# Patient Record
Sex: MALE | Race: WHITE | NOT HISPANIC OR LATINO | Employment: OTHER | URBAN - METROPOLITAN AREA
[De-identification: names, ages, dates, MRNs, and addresses within clinical notes are randomized per-mention and may not be internally consistent; named-entity substitution may affect disease eponyms.]

---

## 2017-07-19 PROBLEM — I10 HTN (HYPERTENSION), BENIGN: Status: ACTIVE | Noted: 2017-07-19

## 2017-08-21 ENCOUNTER — OFFICE VISIT (OUTPATIENT)
Dept: CARDIOLOGY | Facility: CLINIC | Age: 82
End: 2017-08-21

## 2017-08-21 VITALS
DIASTOLIC BLOOD PRESSURE: 98 MMHG | HEART RATE: 59 BPM | HEIGHT: 69 IN | WEIGHT: 202.8 LBS | BODY MASS INDEX: 30.04 KG/M2 | SYSTOLIC BLOOD PRESSURE: 144 MMHG

## 2017-08-21 DIAGNOSIS — E78.5 DYSLIPIDEMIA: ICD-10-CM

## 2017-08-21 DIAGNOSIS — I25.810 CORONARY ARTERY DISEASE INVOLVING CORONARY BYPASS GRAFT OF NATIVE HEART WITHOUT ANGINA PECTORIS: Primary | ICD-10-CM

## 2017-08-21 DIAGNOSIS — I44.0 AV BLOCK, 1ST DEGREE: ICD-10-CM

## 2017-08-21 DIAGNOSIS — R94.31 EKG, ABNORMAL: ICD-10-CM

## 2017-08-21 DIAGNOSIS — I10 ESSENTIAL HYPERTENSION: ICD-10-CM

## 2017-08-21 DIAGNOSIS — R94.31 ABNORMAL EKG: ICD-10-CM

## 2017-08-21 PROCEDURE — 99214 OFFICE O/P EST MOD 30 MIN: CPT | Performed by: PHYSICIAN ASSISTANT

## 2017-08-21 PROCEDURE — 93000 ELECTROCARDIOGRAM COMPLETE: CPT | Performed by: PHYSICIAN ASSISTANT

## 2017-08-21 RX ORDER — MELATONIN
5000 DAILY
Status: ON HOLD | COMMUNITY
End: 2019-12-04

## 2017-08-21 NOTE — PROGRESS NOTES
[facility] at [facility] - Office Note  Agapito Downey         [address] [place] [address]  1935   [phone] (home)      LOCATION:  [place] office.  Visit Type: Follow Up.    PCP:  Kendrick Oconnor MD    08/21/17   Agapito Downey is a 82 y.o.  male  retired.      Chief Complaint: FU on CAD, HTN, HLP.  PROBLEM LIST:  1. Coronary artery disease:  a. History of myocardial infarction in 1994 with TPA and associated NSVT.  b. Angioplasty at that time.  c. Recurrent chest pain approximately 6 months later with catheterization in November 1994.  d. Subsequent coronary artery bypass grafting, November 1994.  e. Multiple stress tests and echocardiograms post procedure with most recent echocardiogram, 01/19/2011:  Ejection fraction 60% to 65% with mild diastolic dysfunction.  No evidence of ischemia on stress test.  2. First-degree AV block.  3. Benign hypertension.  4. Dyslipidemia.  5. History of prostate cancer with seed implantation.  6. Osteoarthritis.  7. Family history of heart disease.  8. Surgical history:  a. Laparoscopic cholecystectomy.  b. Coronary artery bypass grafting.  c. Prostate surgery with seed implantation.  .    Allergies   Allergen Reactions   • Reserpine    • Statins      Myalgias and mood affect disorder             Current Outpatient Prescriptions:   •  aspirin 81 MG EC tablet, Take 81 mg by mouth daily., Disp: , Rfl:   •  carvedilol (COREG) 6.25 MG tablet, 6.25 mg 2 (two) times a day with meals., Disp: , Rfl:   •  cholecalciferol (VITAMIN D3) 1000 units tablet, Take 1,000 Units by mouth Daily., Disp: , Rfl:   •  fluticasone (VERAMYST) 27.5 MCG/SPRAY nasal spray, 2 sprays into each nostril Daily., Disp: , Rfl:   •  magnesium oxide (MAGOX) 400 (241.3 Mg) MG tablet tablet, Take 400 mg by mouth Daily., Disp: , Rfl:   •  Multiple Vitamins-Minerals (CENTRUM ULTRA MENS PO), Take 1 tablet by mouth Daily., Disp: , Rfl:   •  Probiotic Product  "(PROBIOTIC ADVANCED PO), Take 1 tablet by mouth Daily., Disp: , Rfl:   •  psyllium (METAMUCIL) 58.6 % packet, Take 1 packet by mouth Daily., Disp: , Rfl:   •  spironolactone (ALDACTONE) 25 MG tablet, Take 25 mg by mouth every other day., Disp: , Rfl:   •  WELCHOL 625 MG tablet, 625 mg 3 (Three) Times a Day., Disp: , Rfl:   •  ZETIA 10 MG tablet, 10 mg daily., Disp: , Rfl:     HPI  Mr. Downey is here today for routine follow-up on chronic CAD (prior CABG), long-standing controlled hypertension, and chronic first-degree AV block.  He has been on a beta blocker during the entire time he's had a documented AV block.  In the past, he's been asymptomatic.  Today he still continues to deny syncope or presyncopal feelings, denies dizziness, denies lightheadedness.  He denies exertional angina or dyspnea.  However, he mentions that he has brought to his PCP's attention the fact that he has several episodes a year of profound fatigue.  It's unprovoked by activity or exertion.  He has to stop and rest for 30 minutes and then can go back to regular activity.    I noticed patient was coughing a lot during physical exam.  I asked him about this and he states since moving to [place] he's had this persistent cough.  He denies any production, denies sinus pressure but admits to sinus drainage on occasion.    The following portions of the patient's history were reviewed in the chart and updated as appropriate: allergies, current medications, past family history, past medical history, past social history, past surgical history and problem list.    Review of Systems   Constitution: Positive for malaise/fatigue.   HENT: Positive for congestion.    Cardiovascular: Negative for chest pain, dyspnea on exertion, leg swelling, palpitations and syncope.   Respiratory: Positive for cough. Negative for shortness of breath and wheezing.    All other systems reviewed and are negative.        height is 69\" (175.3 cm) and weight is 202 lb 12.8 oz " (92 kg). His blood pressure is 144/98 and his pulse is 59.   Physical Exam   Constitutional: Vital signs are normal. He appears well-developed and well-nourished.   Cardiovascular: Normal rate, regular rhythm, S1 normal, S2 normal, normal heart sounds, intact distal pulses and normal pulses.    Pulmonary/Chest: Effort normal and breath sounds normal. He has no wheezes. He has no rhonchi. He has no rales.   Abdominal: Soft. Normal appearance and bowel sounds are normal. There is no hepatosplenomegaly.   Neurological: He is alert.           ECG 12 Lead  Date/Time: 8/21/2017 10:45 AM  Performed by: WENCESLAO JI  Authorized by: WENCESLAO JI   Comparison: compared with previous ECG from 8/15/2016  Rhythm: sinus rhythm  Rate: normal  Conduction: 2nd degree (Mobitz 1)  QRS axis: normal  Clinical impression: abnormal ECG           Assessment/ Plan     Coronary artery disease involving coronary bypass graft of native heart without angina pectoris:  Because of his EKG change and complaints of fatigue, I'm ordering an echocardiogram to assess valvular structure and LV function.  I'm also going to order a Lexiscan Cardiolite to rule out any coronary involvement of the EKG change. RTC 3 weeks with EKG or sooner.    AV block, 1st degree:  His EKG performed and reviewed today does show some change.  He now has a Mobitz type I block.  It is difficult to tell if he is symptomatic with fatigue because of this or not.  I shared the EKG with Dr. Aviles who was in the office at time of patient's visit.  I'm going to proceed with a ZIO patch, echocardiogram and stress test.  I went to follow-up with patient in 3 weeks with a repeat EKG for further evaluation.    Essential hypertension:  Fairly Well-controlled.  Continue current medical regimen.  Keep a blood pressure log and review at time of follow-up.  We may need to stop his carvedilol at time of follow-up.    Dyslipidemia:  Continue Zetia and appropriate diet.  Continue  with routine activity.  Get annual lipid panel with PCP.      Carolin Canales PA-C  8/21/2017 10:28 AM      EMR Dragon/Transcription disclaimer:   Much of this encounter note is an electronic transcription/translation of spoken language to printed text. The electronic translation of spoken language may permit erroneous, or at times, nonsensical words or phrases to be inadvertently transcribed; Although I have reviewed the note for such errors, some may still exist.

## 2017-09-11 ENCOUNTER — OFFICE VISIT (OUTPATIENT)
Dept: CARDIOLOGY | Facility: CLINIC | Age: 82
End: 2017-09-11

## 2017-09-11 VITALS
WEIGHT: 202.8 LBS | DIASTOLIC BLOOD PRESSURE: 84 MMHG | SYSTOLIC BLOOD PRESSURE: 142 MMHG | HEIGHT: 69 IN | BODY MASS INDEX: 30.04 KG/M2 | HEART RATE: 85 BPM

## 2017-09-11 DIAGNOSIS — I44.0 AV BLOCK, 1ST DEGREE: ICD-10-CM

## 2017-09-11 DIAGNOSIS — I10 ESSENTIAL HYPERTENSION: ICD-10-CM

## 2017-09-11 DIAGNOSIS — I25.810 CORONARY ARTERY DISEASE INVOLVING CORONARY BYPASS GRAFT OF NATIVE HEART WITHOUT ANGINA PECTORIS: Primary | ICD-10-CM

## 2017-09-11 DIAGNOSIS — E78.5 DYSLIPIDEMIA: ICD-10-CM

## 2017-09-11 PROCEDURE — 99211 OFF/OP EST MAY X REQ PHY/QHP: CPT | Performed by: PHYSICIAN ASSISTANT

## 2017-09-11 NOTE — PROGRESS NOTES
[facility] at [facility] - Office Note  Agapito Downey         [address] [address]  1935   [phone] (home)      LOCATION:  [place] [facility].  Visit Type: Follow Up.    PCP:  Kendrick Oconnor MD    09/11/17   Agapito Downey is a 82 y.o.  male  retired.      Chief Complaint: Discuss tests  PROBLEM LIST:  1. Coronary artery disease:  a. History of myocardial infarction in 1994 with TPA and associated NSVT.  b. Angioplasty at that time.  c. Recurrent chest pain approximately 6 months later with catheterization in November 1994.  d. Subsequent coronary artery bypass grafting, November 1994.  e. Multiple stress tests and echocardiograms post procedure with most recent echocardiogram, 01/19/2011:  Ejection fraction 60% to 65% with mild diastolic dysfunction.  No evidence of ischemia on stress test.  2. First-degree AV block.  3. Benign hypertension.  4. Dyslipidemia.  5. History of prostate cancer with seed implantation.  6. Osteoarthritis.  7. Family history of heart disease.  8. Surgical history:  a. Laparoscopic cholecystectomy.  b. Coronary artery bypass grafting.  c. Prostate surgery with seed implantation.  .       Allergies   Allergen Reactions   • Reserpine    • Statins      Myalgias and mood affect disorder             Current Outpatient Prescriptions:   •  aspirin 81 MG EC tablet, Take 81 mg by mouth daily., Disp: , Rfl:   •  carvedilol (COREG) 6.25 MG tablet, 6.25 mg 2 (two) times a day with meals., Disp: , Rfl:   •  cholecalciferol (VITAMIN D3) 1000 units tablet, Take 1,000 Units by mouth Daily., Disp: , Rfl:   •  fluticasone (VERAMYST) 27.5 MCG/SPRAY nasal spray, 2 sprays into each nostril Daily., Disp: , Rfl:   •  magnesium oxide (MAGOX) 400 (241.3 Mg) MG tablet tablet, Take 400 mg by mouth Daily., Disp: , Rfl:   •  Multiple Vitamins-Minerals (CENTRUM ULTRA MENS PO), Take 1 tablet by mouth Daily., Disp: , Rfl:   •  Probiotic Product (PROBIOTIC  "ADVANCED PO), Take 1 tablet by mouth Daily., Disp: , Rfl:   •  psyllium (METAMUCIL) 58.6 % packet, Take 1 packet by mouth Daily., Disp: , Rfl:   •  spironolactone (ALDACTONE) 25 MG tablet, Take 25 mg by mouth every other day., Disp: , Rfl:   •  WELCHOL 625 MG tablet, 625 mg 3 (Three) Times a Day., Disp: , Rfl:   •  ZETIA 10 MG tablet, 10 mg daily., Disp: , Rfl:     HPI    Mr. Downey is here today for follow-up on previous complaints of fatigue.  I had ordered a ZIO patch for an abnormal EKG, and a Lexiscan Cardiolite and echocardiogram for his symptoms.  Since that office visit, he has not yet had his tests performed.  They are to be performed this coming Friday.  Patient, however, had several questions regarding the upcoming tests.  I spent 45 minutes discussing why these were ordered, what they were looking for, and potential decisions based on their findings.  Also, we are waiting on the company to receive his monitor for results to be downloaded.        The following portions of the patient's history were reviewed in the chart and updated as appropriate: allergies, current medications, past family history, past medical history, past social history, past surgical history and problem list.    Review of Systems   All other systems reviewed and are negative.            height is 69\" (175.3 cm) and weight is 202 lb 12.8 oz (92 kg). His blood pressure is 142/84 and his pulse is 85.   Physical Exam  Not performed.  Patient here to discuss upcoming tests.    Procedures    EKG performed and reviewed today shows a normal sinus rhythm with first-degree AV block.  No Mobitz type II noted as compared to previous EKG 3 weeks ago.  Assessment/ Plan   At this time, patient will proceed with Lexiscan Cardiolite and echocardiogram this coming Friday.  I will call him with the results when available.  I'll also call him with the results of his monitor.  I will make him an appointment to return in 6 months with EKG or sooner if " tests are abnormal.  No changes made to his medical regimen at this time.      Carolin Canales PA-C  9/11/2017 11:11 AM      EMR Dragon/Transcription disclaimer:   Much of this encounter note is an electronic transcription/translation of spoken language to printed text. The electronic translation of spoken language may permit erroneous, or at times, nonsensical words or phrases to be inadvertently transcribed; Although I have reviewed the note for such errors, some may still exist.

## 2017-09-15 ENCOUNTER — HOSPITAL ENCOUNTER (OUTPATIENT)
Dept: CARDIOLOGY | Facility: HOSPITAL | Age: 82
Discharge: HOME | End: 2017-09-15

## 2017-09-15 ENCOUNTER — HOSPITAL ENCOUNTER (OUTPATIENT)
Dept: CARDIOLOGY | Facility: HOSPITAL | Age: 82
End: 2017-09-15

## 2017-09-15 ENCOUNTER — HOSPITAL ENCOUNTER (OUTPATIENT)
Dept: CARDIOLOGY | Facility: HOSPITAL | Age: 82
Discharge: HOME | End: 2017-09-19
Admitting: PHYSICIAN ASSISTANT

## 2017-09-15 VITALS — BODY MASS INDEX: 29.92 KG/M2 | HEIGHT: 69 IN | WEIGHT: 202 LBS

## 2017-09-15 DIAGNOSIS — E78.5 DYSLIPIDEMIA: ICD-10-CM

## 2017-09-15 DIAGNOSIS — I25.810 CORONARY ARTERY DISEASE INVOLVING CORONARY BYPASS GRAFT OF NATIVE HEART WITHOUT ANGINA PECTORIS: ICD-10-CM

## 2017-09-15 DIAGNOSIS — I10 ESSENTIAL HYPERTENSION: ICD-10-CM

## 2017-09-15 DIAGNOSIS — I44.0 AV BLOCK, 1ST DEGREE: ICD-10-CM

## 2017-09-15 DIAGNOSIS — R94.31 ABNORMAL EKG: ICD-10-CM

## 2017-09-15 LAB
BH CV ECHO MEAS - AO ROOT AREA (BSA CORRECTED): 2.2
BH CV ECHO MEAS - AO ROOT AREA: 16.6 CM^2
BH CV ECHO MEAS - AO ROOT DIAM: 4.6 CM
BH CV ECHO MEAS - BSA(HAYCOCK): 2.1 M^2
BH CV ECHO MEAS - BSA: 2.1 M^2
BH CV ECHO MEAS - BZI_BMI: 29.8 KILOGRAMS/M^2
BH CV ECHO MEAS - BZI_METRIC_HEIGHT: 175.3 CM
BH CV ECHO MEAS - BZI_METRIC_WEIGHT: 91.6 KG
BH CV ECHO MEAS - CONTRAST EF (2CH): 55.1 ML/M^2
BH CV ECHO MEAS - CONTRAST EF 4CH: 50.8 ML/M^2
BH CV ECHO MEAS - EDV(CUBED): 74.7 ML
BH CV ECHO MEAS - EDV(MOD-SP2): 118 ML
BH CV ECHO MEAS - EDV(MOD-SP4): 128 ML
BH CV ECHO MEAS - EDV(TEICH): 79.1 ML
BH CV ECHO MEAS - EF(CUBED): 35.9 %
BH CV ECHO MEAS - EF(MOD-SP2): 55.1 %
BH CV ECHO MEAS - EF(MOD-SP4): 54 %
BH CV ECHO MEAS - EF(TEICH): 29.8 %
BH CV ECHO MEAS - ESV(CUBED): 47.8 ML
BH CV ECHO MEAS - ESV(MOD-SP2): 53 ML
BH CV ECHO MEAS - ESV(MOD-SP4): 63 ML
BH CV ECHO MEAS - ESV(TEICH): 55.5 ML
BH CV ECHO MEAS - FS: 13.8 %
BH CV ECHO MEAS - IVS/LVPW: 1.1
BH CV ECHO MEAS - IVSD: 1.3 CM
BH CV ECHO MEAS - LA DIMENSION: 4.4 CM
BH CV ECHO MEAS - LA/AO: 0.95
BH CV ECHO MEAS - LAT PEAK E' VEL: 9.1 CM/SEC
BH CV ECHO MEAS - LV DIASTOLIC VOL/BSA (35-75): 61.7 ML/M^2
BH CV ECHO MEAS - LV IVRT: 0.07 SEC
BH CV ECHO MEAS - LV MASS(C)D: 175.9 GRAMS
BH CV ECHO MEAS - LV MASS(C)DI: 84.8 GRAMS/M^2
BH CV ECHO MEAS - LV SYSTOLIC VOL/BSA (12-30): 30.4 ML/M^2
BH CV ECHO MEAS - LVIDD: 4.2 CM
BH CV ECHO MEAS - LVIDS: 3.6 CM
BH CV ECHO MEAS - LVLD AP2: 7.8 CM
BH CV ECHO MEAS - LVLD AP4: 8.8 CM
BH CV ECHO MEAS - LVLS AP2: 6.9 CM
BH CV ECHO MEAS - LVLS AP4: 7.7 CM
BH CV ECHO MEAS - LVOT AREA (M): 3.1 CM^2
BH CV ECHO MEAS - LVOT AREA: 3.3 CM^2
BH CV ECHO MEAS - LVOT DIAM: 2 CM
BH CV ECHO MEAS - LVPWD: 1.1 CM
BH CV ECHO MEAS - MED PEAK E' VEL: 5.15 CM/SEC
BH CV ECHO MEAS - MV A MAX VEL: 89.8 CM/SEC
BH CV ECHO MEAS - MV DEC TIME: 0.3 SEC
BH CV ECHO MEAS - MV E MAX VEL: 63.7 CM/SEC
BH CV ECHO MEAS - MV E/A: 0.71
BH CV ECHO MEAS - PA ACC SLOPE: 655.9 CM/SEC^2
BH CV ECHO MEAS - PA ACC TIME: 0.12 SEC
BH CV ECHO MEAS - PA PR(ACCEL): 22.8 MMHG
BH CV ECHO MEAS - PI END-D VEL: 114.6 CM/SEC
BH CV ECHO MEAS - RAP SYSTOLE: 8 MMHG
BH CV ECHO MEAS - RVSP: 31 MMHG
BH CV ECHO MEAS - SI(CUBED): 12.9 ML/M^2
BH CV ECHO MEAS - SI(MOD-SP2): 31.3 ML/M^2
BH CV ECHO MEAS - SI(MOD-SP4): 31.3 ML/M^2
BH CV ECHO MEAS - SI(TEICH): 11.3 ML/M^2
BH CV ECHO MEAS - SV(CUBED): 26.8 ML
BH CV ECHO MEAS - SV(MOD-SP2): 65 ML
BH CV ECHO MEAS - SV(MOD-SP4): 65 ML
BH CV ECHO MEAS - SV(TEICH): 23.5 ML
BH CV ECHO MEAS - TAPSE (>1.6): 1.7 CM2
BH CV ECHO MEAS - TR MAX V: 23 MMHG
BH CV ECHO MEAS - TR MAX VEL: 242 CM/SEC
BH CV VAS BP LEFT ARM: NORMAL MMHG
BH CV XLRA - RV BASE: 4.9 CM
BH CV XLRA - RV LENGTH: 7.4 CM
BH CV XLRA - RV MID: 3.8 CM
BH CV XLRA - TDI S': 8.77 CM/SEC
E/E' RATIO: 9.5
LEFT ATRIUM VOLUME INDEX: 29.5 ML/M2
LV EF 2D ECHO EST: 54 %

## 2017-09-15 PROCEDURE — 93306 TTE W/DOPPLER COMPLETE: CPT | Performed by: INTERNAL MEDICINE

## 2017-09-15 PROCEDURE — 93018 CV STRESS TEST I&R ONLY: CPT | Performed by: INTERNAL MEDICINE

## 2017-09-15 PROCEDURE — 78452 HT MUSCLE IMAGE SPECT MULT: CPT | Performed by: INTERNAL MEDICINE

## 2017-09-15 RX ADMIN — TECHNETIUM TC-99M SESTAMIBI 1 DOSE: 1 INJECTION INTRAVENOUS at 11:05

## 2017-09-15 NOTE — NURSING NOTE
Patient here for Lexiscan stress test. Patient in Mobitz 1 alternating with 1degree block.  Unable to give Lexiscan due to rhythm.  Patient was instructed to hold Carvedilol 48 hours and return for stress portion of test on Tuesday 9/18/2017.

## 2017-09-18 ENCOUNTER — TELEPHONE (OUTPATIENT)
Dept: CARDIOLOGY | Facility: CLINIC | Age: 82
End: 2017-09-18

## 2017-09-19 ENCOUNTER — HOSPITAL ENCOUNTER (OUTPATIENT)
Dept: CARDIOLOGY | Facility: HOSPITAL | Age: 82
Discharge: HOME | End: 2017-09-19

## 2017-09-19 RX ADMIN — TECHNETIUM TC-99M SESTAMIBI 1 DOSE: 1 INJECTION INTRAVENOUS at 09:10

## 2017-09-21 NOTE — TELEPHONE ENCOUNTER
Called patient and told him results of Zio monitor per Rin Canales PA request. NSR. Slow rates with sleeping. Instructed patient to have a sleep study done per Rin Canales PA request.Patient verbalized understanding stating he would follow up with his PCP for a sleep study. Also told patient stress test was okay-no evidence of ischemia per Rin Canales. Verbalized understanding. Patient requested copies of both tests be mailed to his residence. Called [facility] in Medical records to mail copies to patient.

## 2017-10-02 ENCOUNTER — TELEPHONE (OUTPATIENT)
Dept: CARDIOLOGY | Facility: CLINIC | Age: 82
End: 2017-10-02

## 2017-10-03 NOTE — TELEPHONE ENCOUNTER
Called patient and told him he does not need a PPM at this time. Patient is going to discuss his sleep study with his PCP as mentioned in previous telephone message. Instructed patient to call us with questions or concerns. Verbalized understanding.

## 2019-05-01 PROBLEM — J20.9 ACUTE BRONCHITIS DUE TO INFECTION: Status: ACTIVE | Noted: 2019-05-01

## 2019-05-01 PROBLEM — R06.83 SNORING: Status: ACTIVE | Noted: 2019-05-01

## 2019-05-01 PROBLEM — K58.9 IRRITABLE BOWEL SYNDROME: Status: ACTIVE | Noted: 2019-05-01

## 2019-05-01 PROBLEM — M81.0 OSTEOPOROSIS: Status: ACTIVE | Noted: 2019-05-01

## 2019-05-01 PROBLEM — I44.1 MOBITZ TYPE 1 SECOND DEGREE AV BLOCK: Status: ACTIVE | Noted: 2019-05-01

## 2019-05-01 PROBLEM — I20.9 ANGINA PECTORIS: Status: ACTIVE | Noted: 2019-05-01

## 2019-05-01 PROBLEM — R73.09 ABNORMAL GLUCOSE LEVEL: Status: ACTIVE | Noted: 2019-05-01

## 2019-05-01 PROBLEM — E66.9 OBESITY: Status: ACTIVE | Noted: 2019-05-01

## 2019-05-01 PROBLEM — J30.9 ALLERGIC RHINITIS: Status: ACTIVE | Noted: 2019-05-01

## 2019-05-01 PROBLEM — I25.10 ASHD (ARTERIOSCLEROTIC HEART DISEASE): Status: ACTIVE | Noted: 2019-05-01

## 2019-05-01 PROBLEM — M81.0 SENILE OSTEOPOROSIS: Status: ACTIVE | Noted: 2019-05-01

## 2019-05-01 PROBLEM — Z85.46 HISTORY OF MALIGNANT NEOPLASM OF PROSTATE: Status: ACTIVE | Noted: 2019-05-01

## 2019-05-01 PROBLEM — R53.83 OTHER FATIGUE: Status: ACTIVE | Noted: 2019-05-01

## 2019-05-09 RX ORDER — SPIRONOLACTONE 25 MG/1
TABLET ORAL
Qty: 45 TABLET | Refills: 1 | Status: SHIPPED | OUTPATIENT
Start: 2019-05-09 | End: 2019-11-08 | Stop reason: SDUPTHER

## 2019-05-20 ENCOUNTER — OFFICE VISIT (OUTPATIENT)
Dept: INTERNAL MEDICINE | Facility: CLINIC | Age: 84
End: 2019-05-20

## 2019-05-20 VITALS
DIASTOLIC BLOOD PRESSURE: 82 MMHG | BODY MASS INDEX: 31.07 KG/M2 | SYSTOLIC BLOOD PRESSURE: 140 MMHG | WEIGHT: 205 LBS | HEART RATE: 46 BPM | HEIGHT: 68 IN

## 2019-05-20 DIAGNOSIS — I25.810 CORONARY ARTERY DISEASE INVOLVING CORONARY BYPASS GRAFT OF NATIVE HEART WITHOUT ANGINA PECTORIS: ICD-10-CM

## 2019-05-20 DIAGNOSIS — R53.83 OTHER FATIGUE: ICD-10-CM

## 2019-05-20 DIAGNOSIS — E55.9 VITAMIN D DEFICIENCY: ICD-10-CM

## 2019-05-20 DIAGNOSIS — R73.09 ABNORMAL GLUCOSE LEVEL: ICD-10-CM

## 2019-05-20 DIAGNOSIS — I10 ESSENTIAL HYPERTENSION: ICD-10-CM

## 2019-05-20 DIAGNOSIS — Z00.00 WELL ADULT EXAM: Primary | ICD-10-CM

## 2019-05-20 DIAGNOSIS — Z12.5 SCREENING PSA (PROSTATE SPECIFIC ANTIGEN): ICD-10-CM

## 2019-05-20 DIAGNOSIS — E78.2 MIXED HYPERLIPIDEMIA: ICD-10-CM

## 2019-05-20 PROCEDURE — 96160 PT-FOCUSED HLTH RISK ASSMT: CPT | Performed by: PHYSICIAN ASSISTANT

## 2019-05-20 PROCEDURE — G0439 PPPS, SUBSEQ VISIT: HCPCS | Performed by: PHYSICIAN ASSISTANT

## 2019-05-20 NOTE — PATIENT INSTRUCTIONS
Medicare Wellness  Personal Prevention Plan of Service     Date of Office Visit:  2019  Encounter Provider:  Precious Pires PA-C  Place of Service:  [facility]  Patient Name: Agapito Downey  :  1935    As part of the Medicare Wellness portion of your visit today, we are providing you with this personalized preventive plan of services (PPPS). This plan is based upon recommendations of the [place] Preventive Services Task Force (USPSTF) and the Advisory Committee on Immunization Practices (ACIP).    This lists the preventive care services that should be considered, and provides dates of when you are due. Items listed as completed are up-to-date and do not require any further intervention.    Health Maintenance   Topic Date Due   • ZOSTER VACCINE (2 of 3) 2010   • PNEUMOCOCCAL VACCINES (65+ LOW/MEDIUM RISK) (2 of 2 - PPSV23) 2017   • MEDICARE ANNUAL WELLNESS  2019   • DXA SCAN  2019   • INFLUENZA VACCINE  2019   • LIPID PANEL  11/15/2019   • TDAP/TD VACCINES (2 - Td) 2028       No orders of the defined types were placed in this encounter.      Return for Next scheduled follow up, labs today.        Exercising to Lose Weight  Exercising can help you to lose weight. In order to lose weight through exercise, you need to do vigorous-intensity exercise. You can tell that you are exercising with vigorous intensity if you are breathing very hard and fast and cannot hold a conversation while exercising.  Moderate-intensity exercise helps to maintain your current weight. You can tell that you are exercising at a moderate level if you have a higher heart rate and faster breathing, but you are still able to hold a conversation.  How often should I exercise?  Choose an activity that you enjoy and set realistic goals. Your health care provider can help you to make an activity plan that works for you. Exercise regularly as directed by  your health care provider. This may include:  · Doing resistance training twice each week, such as:  ? Push-ups.  ? Sit-ups.  ? Lifting weights.  ? Using resistance bands.  · Doing a given intensity of exercise for a given amount of time. Choose from these options:  ? 150 minutes of moderate-intensity exercise every week.  ? 75 minutes of vigorous-intensity exercise every week.  ? A mix of moderate-intensity and vigorous-intensity exercise every week.    Children, pregnant women, people who are out of shape, people who are overweight, and older adults may need to consult a health care provider for individual recommendations. If you have any sort of medical condition, be sure to consult your health care provider before starting a new exercise program.  What are some activities that can help me to lose weight?  · Walking at a rate of at least 4.5 miles an hour.  · Jogging or running at a rate of 5 miles per hour.  · Biking at a rate of at least 10 miles per hour.  · Lap swimming.  · Roller-skating or in-line skating.  · Cross-country skiing.  · Vigorous competitive sports, such as football, basketball, and soccer.  · Jumping rope.  · Aerobic dancing.  How can I be more active in my day-to-day activities?  · Use the stairs instead of the elevator.  · Take a walk during your lunch break.  · If you drive, park your car farther away from work or school.  · If you take public transportation, get off one stop early and walk the rest of the way.  · Make all of your phone calls while standing up and walking around.  · Get up, stretch, and walk around every 30 minutes throughout the day.  What guidelines should I follow while exercising?  · Do not exercise so much that you hurt yourself, feel dizzy, or get very short of breath.  · Consult your health care provider prior to starting a new exercise program.  · Wear comfortable clothes and shoes with good support.  · Drink plenty of water while you exercise to prevent dehydration  or heat stroke. Body water is lost during exercise and must be replaced.  · Work out until you breathe faster and your heart beats faster.  This information is not intended to replace advice given to you by your health care provider. Make sure you discuss any questions you have with your health care provider.  Document Released: 01/20/2012 Document Revised: 05/25/2017 Document Reviewed: 05/21/2015  1bib Interactive Patient Education © 2019 [employer]

## 2019-05-20 NOTE — PROGRESS NOTES
Subsequent Medicare Wellness Visit   The ABC's of the Annual Wellness Visit    Chief Complaint   Patient presents with   • Medicare Wellness-subsequent     He is fasting       HPI:  Agapito Downey, -1935, is a 84 y.o. male who presents for a Subsequent Medicare Wellness Visit.    Recent Hospitalizations:  No hospitalization(s) within the last year..    Current Medical Providers:  Patient Care Team:  Kendrick Oconnor MD as PCP - General    Health Habits and Functional and Cognitive Screening and Depression Screening:  Functional & Cognitive Status 2019   Do you have difficulty bathing yourself, getting dressed or grooming yourself? No   Do you have difficulty using the toilet? No   Do you have difficulty moving around from place to place? No   Do you have trouble with steps or getting out of a bed or a chair? No   In the past year have you fallen or experienced a near fall? No   Current Diet Well Balanced Diet   Dental Exam Up to date   Eye Exam Not up to date   Exercise (times per week) 0 times per week   Current Exercise Activities Include Walking   Do you need help using the phone?  No   Are you deaf or do you have serious difficulty hearing?  No   Do you need help with transportation? No   Do you need help shopping? No   Do you need help preparing meals?  No   Do you need help with housework?  No   Do you need help with laundry? No   Do you need help taking your medications? No   Do you need help managing money? No   Do you ever drive or ride in a car without wearing a seat belt? No   Have you felt unusual stress, anger or loneliness in the last month? No   Who do you live with? Spouse   If you need help, do you have trouble finding someone available to you? No   Have you been bothered in the last four weeks by sexual problems? No   Do you have difficulty concentrating, remembering or making decisions? No       Compared to one year ago, the patient feels his physical health is the same and  his mental health is the same.    Depression Screen:  PHQ-2/PHQ-9 Depression Screening 5/20/2019   Little interest or pleasure in doing things 0   Feeling down, depressed, or hopeless 0   Total Score 0         Past Medical/Family/Social History:  The following portions of the patient's history were reviewed and updated as appropriate: allergies, current medications, past family history, past medical history, past social history and past surgical history.    Allergies   Allergen Reactions   • Reserpine Other (See Comments)     depression   • Statins Other (See Comments)     Myalgias and mood affect disorder  fatigue           Current Outpatient Medications:   •  aspirin 81 MG EC tablet, Take 81 mg by mouth daily., Disp: , Rfl:   •  cholecalciferol (VITAMIN D3) 1000 units tablet, Take 1,000 Units by mouth Daily., Disp: , Rfl:   •  Evolocumab (REPATHA SC), Inject  under the skin into the appropriate area as directed., Disp: , Rfl:   •  fluticasone (VERAMYST) 27.5 MCG/SPRAY nasal spray, 2 sprays into each nostril Daily., Disp: , Rfl:   •  Magnesium 400 MG capsule, Take 1 capsule by mouth Daily., Disp: , Rfl:   •  magnesium oxide (MAGOX) 400 (241.3 Mg) MG tablet tablet, Take 400 mg by mouth Daily., Disp: , Rfl:   •  Multiple Vitamins-Minerals (CENTRUM ULTRA MENS PO), Take 1 tablet by mouth Daily., Disp: , Rfl:   •  Probiotic Product (PROBIOTIC ADVANCED PO), Take 1 tablet by mouth Daily., Disp: , Rfl:   •  psyllium (METAMUCIL) 58.6 % packet, Take 1 packet by mouth Daily., Disp: , Rfl:   •  spironolactone (ALDACTONE) 25 MG tablet, TAKE ONE TABLET BY MOUTH EVERY OTHER DAY , Disp: 45 tablet, Rfl: 1    Aspirin use counseling: Taking ASA appropriately as indicated    Current medication list contains no high risk medications.  No harmful drug interactions have been identified.     Family History   Problem Relation Age of Onset   • Heart disease Other    • Coronary artery disease Brother         cause of death       Social  "History     Tobacco Use   • Smoking status: Never Smoker   • Smokeless tobacco: Never Used   Substance Use Topics   • Alcohol use: No       Past Surgical History:   Procedure Laterality Date   • CHOLECYSTECTOMY  1995    laparoscopic   • CORONARY ARTERY BYPASS GRAFT  11/1994   • INSERTION PROSTATE RADIATION SEED  2006    Cancer, prostate; PSA has since been undetectable   • OTHER SURGICAL HISTORY      Prostate surgery with seed implantation.   • TONSILLECTOMY  1943       Patient Active Problem List   Diagnosis   • Coronary artery disease involving coronary bypass graft of native heart without angina pectoris   • AV block, 1st degree   • Essential hypertension   • Hyperlipemia   • Dyslipidemia   • Osteoarthritis   • HTN (hypertension), benign   • Other fatigue   • Allergic rhinitis   • ASHD (arteriosclerotic heart disease)   • Acute bronchitis due to infection   • Angina pectoris (CMS/HCC)   • History of malignant neoplasm of prostate   • Snoring   • Abnormal glucose level   • Osteoporosis   • Obesity   • Senile osteoporosis   • Irritable bowel syndrome   • Mobitz type 1 second degree AV block       Review of Systems    Objective     Vitals:    05/20/19 0900   BP: 140/82   BP Location: Left arm   Patient Position: Sitting   Cuff Size: Adult   Pulse: (!) 46   Weight: 93 kg (205 lb)   Height: 172.7 cm (68\")   PainSc: 0-No pain       Patient's Body mass index is 31.17 kg/m². BMI is above normal parameters. Recommendations include: exercise counseling.      No exam data present    The patient has no evidence of cognitve impairment.   ATTENTION  What is the year: correct  What is the month of the year: correct  What is the day of the week?: correct  What is the date?: correct  MEMORY  Repeat address three times, only score third attempt: [place] [address] [place] [address]: 7  HOW MANY ANIMALS DID THE PATIENT NAME  Verbal Fluency -- Animal Names (0-25): 22+  CLOCK DRAWING  Clock Drawing: All Correct  MEMORY " RECALL  Tell me what you remember about that name and address we were repeating at the beginnin  ACE TOTAL SCORE  Total ACE Score - <25/30 strongly suggests cognitive impairment; <21/30 almost certainly shows dementia: 28    Physical Exam    Recent Lab Results:          Assessment/Plan   Age-appropriate Screening Schedule:  Refer to the list below for future screening recommendations based on patient's age, sex and/or medical conditions.      Health Maintenance   Topic Date Due   • ZOSTER VACCINE (2 of 3) 2010   • PNEUMOCOCCAL VACCINES (65+ LOW/MEDIUM RISK) (2 of 2 - PPSV23) 2017   • DXA SCAN  2019   • INFLUENZA VACCINE  2019   • LIPID PANEL  11/15/2019   • TDAP/TD VACCINES (2 - Td) 2028       Medicare Risks and Personalized Health Plan:  inactivity      CMS-Preventive Services Quick Reference  Medicare Preventive Services Addressed:  Exercise counseling provided    Advance Care Planning:  Patient does not have an advance directive - not interested in additional information    Diagnoses and all orders for this visit:    1. Well adult exam (Primary)  Comments:  He is fasting for labs.  Normal cognitive assessment, ACE .    2. Coronary artery disease involving coronary bypass graft of native heart without angina pectoris    3. Essential hypertension  -     MicroAlbumin, Urine, Random - Urine, Clean Catch; Future    4. Mixed hyperlipidemia  -     Comprehensive metabolic panel; Future  -     Lipid panel; Future    5. Abnormal glucose level  -     Hemoglobin A1c; Future    6. Other fatigue  -     CBC w AUTO Differential; Future    7. Screening PSA (prostate specific antigen)  -     PSA SCREENING; Future    8. Vitamin D deficiency  -     Vitamin D 25 hydroxy; Future        An After Visit Summary and PPPS with all of these plans were given to the patient.      Follow Up:  Return for Next scheduled follow up, labs today.

## 2019-05-21 LAB
25(OH)D3+25(OH)D2 SERPL-MCNC: 93.7 NG/ML (ref 30–100)
ALBUMIN SERPL-MCNC: 4.4 G/DL (ref 3.5–5.2)
ALBUMIN/GLOB SERPL: 1.6 G/DL
ALP SERPL-CCNC: 49 U/L (ref 39–117)
ALT SERPL-CCNC: 15 U/L (ref 1–41)
AST SERPL-CCNC: 16 U/L (ref 1–40)
BASOPHILS # BLD AUTO: 0.08 10*3/MM3 (ref 0–0.2)
BASOPHILS NFR BLD AUTO: 1.1 % (ref 0–1.5)
BILIRUB SERPL-MCNC: 0.8 MG/DL (ref 0.2–1.2)
BUN SERPL-MCNC: 15 MG/DL (ref 8–23)
BUN/CREAT SERPL: 12.6 (ref 7–25)
CALCIUM SERPL-MCNC: 9.9 MG/DL (ref 8.6–10.5)
CHLORIDE SERPL-SCNC: 102 MMOL/L (ref 98–107)
CHOLEST SERPL-MCNC: 149 MG/DL (ref 0–200)
CO2 SERPL-SCNC: 24.2 MMOL/L (ref 22–29)
CREAT SERPL-MCNC: 1.19 MG/DL (ref 0.76–1.27)
EOSINOPHIL # BLD AUTO: 0.24 10*3/MM3 (ref 0–0.4)
EOSINOPHIL NFR BLD AUTO: 3.3 % (ref 0.3–6.2)
ERYTHROCYTE [DISTWIDTH] IN BLOOD BY AUTOMATED COUNT: 12.6 % (ref 12.3–15.4)
GLOBULIN SER CALC-MCNC: 2.8 GM/DL
GLUCOSE SERPL-MCNC: 134 MG/DL (ref 65–99)
HBA1C MFR BLD: 6.1 % (ref 4.8–5.6)
HCT VFR BLD AUTO: 48.2 % (ref 37.5–51)
HDLC SERPL-MCNC: 50 MG/DL (ref 40–60)
HGB BLD-MCNC: 15.7 G/DL (ref 13–17.7)
IMM GRANULOCYTES # BLD AUTO: 0.04 10*3/MM3 (ref 0–0.05)
IMM GRANULOCYTES NFR BLD AUTO: 0.6 % (ref 0–0.5)
LDLC SERPL CALC-MCNC: 63 MG/DL (ref 0–100)
LYMPHOCYTES # BLD AUTO: 1.71 10*3/MM3 (ref 0.7–3.1)
LYMPHOCYTES NFR BLD AUTO: 23.7 % (ref 19.6–45.3)
MCH RBC QN AUTO: 31.5 PG (ref 26.6–33)
MCHC RBC AUTO-ENTMCNC: 32.6 G/DL (ref 31.5–35.7)
MCV RBC AUTO: 96.6 FL (ref 79–97)
MICROALBUMIN UR-MCNC: 3.3 UG/ML
MONOCYTES # BLD AUTO: 0.59 10*3/MM3 (ref 0.1–0.9)
MONOCYTES NFR BLD AUTO: 8.2 % (ref 5–12)
NEUTROPHILS # BLD AUTO: 4.56 10*3/MM3 (ref 1.7–7)
NEUTROPHILS NFR BLD AUTO: 63.1 % (ref 42.7–76)
NRBC BLD AUTO-RTO: 0 /100 WBC (ref 0–0.2)
PLATELET # BLD AUTO: 286 10*3/MM3 (ref 140–450)
POTASSIUM SERPL-SCNC: 4.9 MMOL/L (ref 3.5–5.2)
PROT SERPL-MCNC: 7.2 G/DL (ref 6–8.5)
PSA SERPL-MCNC: 0.04 NG/ML (ref 0–4)
RBC # BLD AUTO: 4.99 10*6/MM3 (ref 4.14–5.8)
SODIUM SERPL-SCNC: 138 MMOL/L (ref 136–145)
TRIGL SERPL-MCNC: 178 MG/DL (ref 0–150)
VLDLC SERPL CALC-MCNC: 35.6 MG/DL
WBC # BLD AUTO: 7.22 10*3/MM3 (ref 3.4–10.8)

## 2019-05-23 RX ORDER — ALPRAZOLAM 0.5 MG/1
0.5 TABLET ORAL AS NEEDED
Qty: 10 TABLET | Refills: 0 | Status: SHIPPED | OUTPATIENT
Start: 2019-05-23

## 2019-05-23 NOTE — TELEPHONE ENCOUNTER
Last seen:5/20/19    Last filled:6/23/16    Next appt:5/28/19    CATIA:karina enrollment pending

## 2019-05-28 ENCOUNTER — OFFICE VISIT (OUTPATIENT)
Dept: INTERNAL MEDICINE | Facility: CLINIC | Age: 84
End: 2019-05-28

## 2019-05-28 VITALS
HEART RATE: 56 BPM | HEIGHT: 68 IN | SYSTOLIC BLOOD PRESSURE: 120 MMHG | DIASTOLIC BLOOD PRESSURE: 60 MMHG | WEIGHT: 200 LBS | BODY MASS INDEX: 30.31 KG/M2

## 2019-05-28 DIAGNOSIS — I10 ESSENTIAL HYPERTENSION: Primary | ICD-10-CM

## 2019-05-28 DIAGNOSIS — E78.2 MIXED HYPERLIPIDEMIA: ICD-10-CM

## 2019-05-28 DIAGNOSIS — R73.03 PREDIABETES: ICD-10-CM

## 2019-05-28 DIAGNOSIS — I25.810 CORONARY ARTERY DISEASE INVOLVING CORONARY BYPASS GRAFT OF NATIVE HEART WITHOUT ANGINA PECTORIS: ICD-10-CM

## 2019-05-28 DIAGNOSIS — Z85.46 HISTORY OF MALIGNANT NEOPLASM OF PROSTATE: ICD-10-CM

## 2019-05-28 DIAGNOSIS — M81.0 AGE-RELATED OSTEOPOROSIS WITHOUT CURRENT PATHOLOGICAL FRACTURE: ICD-10-CM

## 2019-05-28 DIAGNOSIS — I20.9 ANGINA PECTORIS (HCC): ICD-10-CM

## 2019-05-28 DIAGNOSIS — E66.09 CLASS 1 OBESITY DUE TO EXCESS CALORIES WITH SERIOUS COMORBIDITY AND BODY MASS INDEX (BMI) OF 30.0 TO 30.9 IN ADULT: ICD-10-CM

## 2019-05-28 PROCEDURE — 99214 OFFICE O/P EST MOD 30 MIN: CPT | Performed by: INTERNAL MEDICINE

## 2019-05-28 RX ORDER — LOSARTAN POTASSIUM 50 MG/1
50 TABLET ORAL DAILY
Start: 2019-05-28

## 2019-05-28 RX ORDER — AMLODIPINE BESYLATE 5 MG/1
5 TABLET ORAL DAILY
Start: 2019-05-28 | End: 2019-12-02

## 2019-05-28 NOTE — PROGRESS NOTES
[facility]     Agapito Downey  1935   [id number]      Patient Care Team:  Kendrick Oconnor MD as PCP - General    Chief Complaint::   Chief Complaint   Patient presents with   • Coronary Artery Disease   • Hyperlipidemia   • Hypertension   • Osteoarthritis        HPI  Mr. Downey is now 84.  He comes in for follow-up of his hypertension, hyperlipidemia, prediabetes, osteoarthritis, and coronary artery disease.  Overall he feels well.  He is working very hard on diet, at one point substituting Slim fast for lunch and dinner with minimal weight loss.  He cut out peanut butter for breakfast which by his calculation was 1200 nils, and did not lose weight.  He also continues to experience an intermittent sharp left discomfort at the left pelvic rim that appears to be positional.  He was concerned it might be diverticulitis, but it comes and goes, responds to ibuprofen, and he has had no GI symptoms.  Lastly he notes that he has difficulty speaking if he is lying down.  He notices this if his son calls him early while he still in bed.  His dentist told him he had a enlarged uvula, but it does not bother him at any other time.  Chronic Conditions:      Patient Active Problem List   Diagnosis   • Coronary artery disease involving coronary bypass graft of native heart without angina pectoris   • AV block, 1st degree   • Essential hypertension   • Hyperlipemia   • Dyslipidemia   • Osteoarthritis   • HTN (hypertension), benign   • Other fatigue   • Allergic rhinitis   • ASHD (arteriosclerotic heart disease)   • Angina pectoris (CMS/HCC)   • History of malignant neoplasm of prostate   • Snoring   • Abnormal glucose level   • Osteoporosis   • Obesity   • Senile osteoporosis   • Irritable bowel syndrome   • Mobitz type 1 second degree AV block   • Prediabetes        Past Medical History:   Diagnosis Date   • AV bloc first degree    • CAD (coronary artery disease)    • Cancer (CMS/HCC)     prostate  cancer seed implantion; Brachytherapy 2006, PSA has since been undetectable   • Diverticulosis    • Dyslipidemia    • Hypertension     benign   • Nephrolithiasis 09/20/2014   • Osteoarthritis        Past Surgical History:   Procedure Laterality Date   • CHOLECYSTECTOMY  1995    laparoscopic   • CORONARY ARTERY BYPASS GRAFT  11/1994   • INSERTION PROSTATE RADIATION SEED  2006    Cancer, prostate; PSA has since been undetectable   • OTHER SURGICAL HISTORY      Prostate surgery with seed implantation.   • TONSILLECTOMY  1943       Family History   Problem Relation Age of Onset   • Heart disease Other    • Coronary artery disease Brother         cause of death       Social History     Socioeconomic History   • Marital status:      Spouse name: Not on file   • Number of children: Not on file   • Years of education: Not on file   • Highest education level: Not on file   Tobacco Use   • Smoking status: Never Smoker   • Smokeless tobacco: Never Used   Substance and Sexual Activity   • Alcohol use: No   • Drug use: No   • Sexual activity: Defer       Allergies   Allergen Reactions   • Reserpine Other (See Comments)     depression   • Statins Other (See Comments)     Myalgias and mood affect disorder  fatigue           Current Outpatient Medications:   •  ALPRAZolam (XANAX) 0.5 MG tablet, Take 1 tablet by mouth As Needed for Anxiety., Disp: 10 tablet, Rfl: 0  •  aspirin 81 MG EC tablet, Take 81 mg by mouth daily., Disp: , Rfl:   •  cholecalciferol (VITAMIN D3) 1000 units tablet, Take 1,000 Units by mouth Daily., Disp: , Rfl:   •  Evolocumab (REPATHA SC), Inject  under the skin into the appropriate area as directed., Disp: , Rfl:   •  fluticasone (VERAMYST) 27.5 MCG/SPRAY nasal spray, 2 sprays into each nostril Daily., Disp: , Rfl:   •  Magnesium 400 MG capsule, Take 1 capsule by mouth Daily., Disp: , Rfl:   •  Multiple Vitamins-Minerals (CENTRUM ULTRA MENS PO), Take 1 tablet by mouth Daily., Disp: , Rfl:   •  Probiotic  "Product (PROBIOTIC ADVANCED PO), Take 1 tablet by mouth Daily., Disp: , Rfl:   •  psyllium (METAMUCIL) 58.6 % packet, Take 1 packet by mouth Daily., Disp: , Rfl:   •  spironolactone (ALDACTONE) 25 MG tablet, TAKE ONE TABLET BY MOUTH EVERY OTHER DAY , Disp: 45 tablet, Rfl: 1  •  amLODIPine (NORVASC) 5 MG tablet, Take 1 tablet by mouth Daily., Disp: , Rfl:   •  losartan (COZAAR) 50 MG tablet, Take 1 tablet by mouth Daily., Disp: , Rfl:     Review of Systems   Constitutional: Negative for chills, fatigue and fever.   HENT: Negative for congestion, ear pain and sinus pressure.    Respiratory: Negative for cough, chest tightness, shortness of breath and wheezing.    Cardiovascular: Negative for chest pain and palpitations.   Gastrointestinal: Negative for abdominal pain, blood in stool and constipation.   Skin: Negative for color change.   Allergic/Immunologic: Negative for environmental allergies.   Neurological: Negative for dizziness, speech difficulty and headache.   Psychiatric/Behavioral: Negative for decreased concentration. The patient is not nervous/anxious.         Vital Signs  Vitals:    05/28/19 1040   BP: 120/60   BP Location: Right arm   Patient Position: Sitting   Cuff Size: Adult   Pulse: 56  Comment: irregular   Weight: 90.7 kg (200 lb)   Height: 172.7 cm (67.99\")       Physical Exam   Constitutional: He is oriented to person, place, and time. He appears well-developed and well-nourished.   HENT:   Head: Normocephalic and atraumatic.   Right Ear: External ear normal.   Left Ear: External ear normal.   Nose: Nose normal.   Mouth/Throat: Oropharynx is clear and moist. No oropharyngeal exudate.   Eyes: Conjunctivae and EOM are normal. Pupils are equal, round, and reactive to light.   Neck: Normal range of motion. Neck supple. No JVD present. No thyromegaly present.   Cardiovascular: Normal rate, regular rhythm, normal heart sounds and intact distal pulses. Exam reveals no gallop and no friction rub.   No " murmur heard.  Pulmonary/Chest: Effort normal and breath sounds normal. No respiratory distress. He has no wheezes. He has no rales. He exhibits no tenderness.   Bilateral gynecomastia   Abdominal: Soft. Bowel sounds are normal. He exhibits no distension and no mass. There is no tenderness. There is no rebound and no guarding. No hernia.   Musculoskeletal: Normal range of motion. He exhibits edema. He exhibits no tenderness.   Lymphadenopathy:     He has no cervical adenopathy.   Neurological: He is alert and oriented to person, place, and time. He displays normal reflexes. No cranial nerve deficit or sensory deficit. He exhibits normal muscle tone. Coordination normal.   Skin: Skin is warm and dry. No rash noted. No erythema.   Psychiatric: He has a normal mood and affect. His behavior is normal. Judgment and thought content normal.   Nursing note and vitals reviewed.     Procedures    ACE III MINI             Assessment/Plan:    Agapito was seen today for coronary artery disease, hyperlipidemia, hypertension and osteoarthritis.    Diagnoses and all orders for this visit:    Essential hypertension    Mixed hyperlipidemia    Coronary artery disease involving coronary bypass graft of native heart without angina pectoris    Age-related osteoporosis without current pathological fracture    History of malignant neoplasm of prostate    Class 1 obesity due to excess calories with serious comorbidity and body mass index (BMI) of 30.0 to 30.9 in adult    Prediabetes    Angina pectoris (CMS/HCC)    Other orders  -     amLODIPine (NORVASC) 5 MG tablet; Take 1 tablet by mouth Daily.  -     losartan (COZAAR) 50 MG tablet; Take 1 tablet by mouth Daily.    Plan    He remains in good health with reasonably good dietary habits.  He is frustrated by his inability to lose weight although he is made substantial efforts.  However his efforts have prevented further weight gain which is important.  The discomfort in his left pelvic rim  area is almost certainly musculoskeletal with no reason to believe GI is the source.  We discussed the issue with the uvula/postnasal drainage, and he states that this point is not limiting any does not wish to pursue further.    Blood pressure is well controlled on amlodipine and losartan    Lipids are well controlled on Repatha.    Coronary artery disease remains asymptomatic, he will continue follow-up with Dr. Campbell.    BMI is 30, discussed strategies for further weight loss, although I do think it is a plus that he did not gain weight.    PSA remains less than.01, continue to monitor.    A1c is 6.1, which remains in the prediabetic range.  Encouraged him to continue working on restricting calories and reducing carbohydrates.        Plan of care reviewed with patient at the conclusion of today's visit. Education was provided regarding diagnosis, management, and any prescribed or recommended OTC medications.Patient verbalizes understanding of and agreement with management plan.         Kendrick Oconnor MD

## 2019-09-19 ENCOUNTER — FLU SHOT (OUTPATIENT)
Dept: INTERNAL MEDICINE | Facility: CLINIC | Age: 84
End: 2019-09-19

## 2019-09-19 DIAGNOSIS — Z23 IMMUNIZATION, PNEUMOCOCCUS AND INFLUENZA: ICD-10-CM

## 2019-09-19 PROCEDURE — 90653 IIV ADJUVANT VACCINE IM: CPT | Performed by: INTERNAL MEDICINE

## 2019-09-19 PROCEDURE — G0008 ADMIN INFLUENZA VIRUS VAC: HCPCS | Performed by: INTERNAL MEDICINE

## 2019-11-08 RX ORDER — SPIRONOLACTONE 25 MG/1
TABLET ORAL
Qty: 45 TABLET | Refills: 0 | Status: SHIPPED | OUTPATIENT
Start: 2019-11-08 | End: 2020-02-03

## 2019-11-22 ENCOUNTER — TELEPHONE (OUTPATIENT)
Dept: INTERNAL MEDICINE | Facility: CLINIC | Age: 84
End: 2019-11-22

## 2019-11-22 DIAGNOSIS — E03.9 ACQUIRED HYPOTHYROIDISM: ICD-10-CM

## 2019-11-22 DIAGNOSIS — M81.0 SENILE OSTEOPOROSIS: ICD-10-CM

## 2019-11-22 DIAGNOSIS — Z85.46 HISTORY OF MALIGNANT NEOPLASM OF PROSTATE: ICD-10-CM

## 2019-11-22 DIAGNOSIS — I10 ESSENTIAL HYPERTENSION: Primary | ICD-10-CM

## 2019-11-22 DIAGNOSIS — R73.03 PREDIABETES: ICD-10-CM

## 2019-11-22 DIAGNOSIS — E78.2 MIXED HYPERLIPIDEMIA: ICD-10-CM

## 2019-11-25 ENCOUNTER — LAB (OUTPATIENT)
Dept: LAB | Facility: HOSPITAL | Age: 84
End: 2019-11-25

## 2019-11-25 DIAGNOSIS — I10 ESSENTIAL HYPERTENSION: ICD-10-CM

## 2019-11-25 DIAGNOSIS — M81.0 SENILE OSTEOPOROSIS: ICD-10-CM

## 2019-11-25 DIAGNOSIS — Z85.46 HISTORY OF MALIGNANT NEOPLASM OF PROSTATE: ICD-10-CM

## 2019-11-25 DIAGNOSIS — R73.03 PREDIABETES: ICD-10-CM

## 2019-11-25 DIAGNOSIS — E78.2 MIXED HYPERLIPIDEMIA: ICD-10-CM

## 2019-11-25 DIAGNOSIS — E03.9 ACQUIRED HYPOTHYROIDISM: ICD-10-CM

## 2019-11-26 LAB
25(OH)D3+25(OH)D2 SERPL-MCNC: 81.8 NG/ML (ref 30–100)
ALBUMIN SERPL-MCNC: 4.5 G/DL (ref 3.5–5.2)
ALBUMIN/GLOB SERPL: 1.7 G/DL
ALP SERPL-CCNC: 53 U/L (ref 39–117)
ALT SERPL-CCNC: 17 U/L (ref 1–41)
AST SERPL-CCNC: 15 U/L (ref 1–40)
BILIRUB SERPL-MCNC: 0.6 MG/DL (ref 0.2–1.2)
BUN SERPL-MCNC: 16 MG/DL (ref 8–23)
BUN/CREAT SERPL: 14.4 (ref 7–25)
CALCIUM SERPL-MCNC: 9.4 MG/DL (ref 8.6–10.5)
CHLORIDE SERPL-SCNC: 103 MMOL/L (ref 98–107)
CHOLEST SERPL-MCNC: 113 MG/DL (ref 0–200)
CO2 SERPL-SCNC: 26.2 MMOL/L (ref 22–29)
CREAT SERPL-MCNC: 1.11 MG/DL (ref 0.76–1.27)
GLOBULIN SER CALC-MCNC: 2.7 GM/DL
GLUCOSE SERPL-MCNC: 137 MG/DL (ref 65–99)
HBA1C MFR BLD: 6.2 % (ref 4.8–5.6)
HDLC SERPL-MCNC: 44 MG/DL (ref 40–60)
LDLC SERPL CALC-MCNC: 39 MG/DL (ref 0–100)
POTASSIUM SERPL-SCNC: 4.6 MMOL/L (ref 3.5–5.2)
PROT SERPL-MCNC: 7.2 G/DL (ref 6–8.5)
PSA SERPL-MCNC: <0.014 NG/ML (ref 0–4)
SODIUM SERPL-SCNC: 141 MMOL/L (ref 136–145)
TRIGL SERPL-MCNC: 148 MG/DL (ref 0–150)
TSH SERPL DL<=0.005 MIU/L-ACNC: 9.28 UIU/ML (ref 0.27–4.2)
VLDLC SERPL CALC-MCNC: 29.6 MG/DL

## 2019-12-02 ENCOUNTER — TRANSCRIBE ORDERS (OUTPATIENT)
Dept: ADMINISTRATIVE | Facility: HOSPITAL | Age: 84
End: 2019-12-02

## 2019-12-02 ENCOUNTER — OFFICE VISIT (OUTPATIENT)
Dept: INTERNAL MEDICINE | Facility: CLINIC | Age: 84
End: 2019-12-02

## 2019-12-02 VITALS
WEIGHT: 199 LBS | HEART RATE: 56 BPM | BODY MASS INDEX: 30.16 KG/M2 | SYSTOLIC BLOOD PRESSURE: 166 MMHG | HEIGHT: 68 IN | DIASTOLIC BLOOD PRESSURE: 82 MMHG

## 2019-12-02 DIAGNOSIS — I10 ESSENTIAL HYPERTENSION: Primary | ICD-10-CM

## 2019-12-02 DIAGNOSIS — E78.5 DYSLIPIDEMIA: ICD-10-CM

## 2019-12-02 DIAGNOSIS — I25.810 CORONARY ARTERY DISEASE INVOLVING CORONARY BYPASS GRAFT OF NATIVE HEART WITHOUT ANGINA PECTORIS: ICD-10-CM

## 2019-12-02 DIAGNOSIS — I49.5 SSS (SICK SINUS SYNDROME) (HCC): ICD-10-CM

## 2019-12-02 DIAGNOSIS — I49.5 SSS (SICK SINUS SYNDROME) (HCC): Primary | ICD-10-CM

## 2019-12-02 DIAGNOSIS — R73.03 PREDIABETES: ICD-10-CM

## 2019-12-02 DIAGNOSIS — E03.9 ACQUIRED HYPOTHYROIDISM: ICD-10-CM

## 2019-12-02 PROCEDURE — 99214 OFFICE O/P EST MOD 30 MIN: CPT | Performed by: INTERNAL MEDICINE

## 2019-12-02 RX ORDER — CHOLECALCIFEROL (VITAMIN D3) 1250 MCG
1 CAPSULE ORAL WEEKLY
Status: ON HOLD | COMMUNITY
Start: 2018-12-06 | End: 2019-12-04

## 2019-12-02 RX ORDER — CHLORAL HYDRATE 500 MG
1 CAPSULE ORAL DAILY
Status: ON HOLD | COMMUNITY
End: 2019-12-04

## 2019-12-02 NOTE — PROGRESS NOTES
[facility]     Agapito Downey  1935   [id number]      Patient Care Team:  Kendrick Oconnor MD as PCP - General    Chief Complaint::   Chief Complaint   Patient presents with   • Hyperlipidemia   • Hypertension   • Coronary Artery Disease   • Prediabetes        HPI  Mr. Downey comes in for follow-up of his hypertension, coronary artery disease, prediabetes, and dyslipidemia.  He is not sleeping well, may try melatonin,   draggy at 11 am.  He continues to follow-up with cardiology for his Pacemaker.  There is no chest pain or dyspnea.  He continues to experience a dry hacking cough.    Chronic Conditions:      Patient Active Problem List   Diagnosis   • Coronary artery disease involving coronary bypass graft of native heart without angina pectoris   • AV block, 1st degree   • Essential hypertension   • Hyperlipemia   • Dyslipidemia   • Osteoarthritis   • HTN (hypertension), benign   • Other fatigue   • Allergic rhinitis   • ASHD (arteriosclerotic heart disease)   • Angina pectoris (CMS/HCC)   • History of malignant neoplasm of prostate   • Snoring   • Abnormal glucose level   • Osteoporosis   • Obesity   • Senile osteoporosis   • Irritable bowel syndrome   • Mobitz type 1 second degree AV block   • Prediabetes   • SSS (sick sinus syndrome) (CMS/HCC)   • Acquired hypothyroidism        Past Medical History:   Diagnosis Date   • AV bloc first degree    • CAD (coronary artery disease)    • Cancer (CMS/HCC)     prostate cancer seed implantion; Brachytherapy 2006, PSA has since been undetectable   • Diverticulosis    • Dyslipidemia    • Hyperlipidemia    • Hypertension     benign   • Nephrolithiasis 09/20/2014   • Osteoarthritis        Past Surgical History:   Procedure Laterality Date   • CHOLECYSTECTOMY  1995    laparoscopic   • CORONARY ARTERY BYPASS GRAFT  11/1994   • INSERTION PROSTATE RADIATION SEED  2006    Cancer, prostate; PSA has since been undetectable   • OTHER SURGICAL HISTORY       Prostate surgery with seed implantation.   • TONSILLECTOMY  1943       Family History   Problem Relation Age of Onset   • Heart disease Other    • Coronary artery disease Brother         cause of death       Social History     Socioeconomic History   • Marital status:      Spouse name: Not on file   • Number of children: Not on file   • Years of education: Not on file   • Highest education level: Not on file   Tobacco Use   • Smoking status: Never Smoker   • Smokeless tobacco: Never Used   Substance and Sexual Activity   • Alcohol use: No   • Drug use: No   • Sexual activity: Defer       Allergies   Allergen Reactions   • Reserpine Other (See Comments)     depression   • Statins Other (See Comments)     Myalgias and mood affect disorder  fatigue         No current facility-administered medications for this visit.   No current outpatient medications on file.    Facility-Administered Medications Ordered in Other Visits:   •  ALPRAZolam (XANAX) tablet 0.5 mg, 0.5 mg, Oral, Daily PRN, Federico Campbell MD  •  aspirin EC tablet 81 mg, 81 mg, Oral, Daily, Federico Campbell MD, 81 mg at 12/04/19 1903  •  [START ON 12/5/2019] losartan (COZAAR) tablet 50 mg, 50 mg, Oral, Daily, Federico Campbell MD  •  morphine injection 1 mg, 1 mg, Intravenous, Q4H PRN **AND** naloxone (NARCAN) injection 0.4 mg, 0.4 mg, Intravenous, Q5 Min PRN, Federico Campbell MD  •  ondansetron (ZOFRAN) injection 4 mg, 4 mg, Intravenous, Q6H PRN, Federico Campbell MD  •  spironolactone (ALDACTONE) tablet 25 mg, 25 mg, Oral, Every Other Day, Federico Campbell MD, 25 mg at 12/04/19 1903  •  temazepam (RESTORIL) capsule 7.5 mg, 7.5 mg, Oral, Nightly PRN, Federico Campbell MD    Review of Systems   Constitutional: Negative for chills, fatigue and fever.   HENT: Negative for congestion, ear pain and sinus pressure.    Respiratory: Positive for cough. Negative for chest tightness, shortness of breath and wheezing.    Cardiovascular:  "Negative for chest pain and palpitations.   Gastrointestinal: Negative for abdominal pain, blood in stool and constipation.   Skin: Negative for color change.   Allergic/Immunologic: Negative for environmental allergies.   Neurological: Negative for dizziness, speech difficulty and headache.   Psychiatric/Behavioral: Negative for decreased concentration. The patient is not nervous/anxious.         Vital Signs  Vitals:    12/02/19 1100   BP: 166/82   BP Location: Left arm   Patient Position: Sitting   Cuff Size: Adult   Pulse: 56   Weight: 90.3 kg (199 lb)   Height: 172.7 cm (67.99\")   PainSc: 0-No pain       Physical Exam   Constitutional: He is oriented to person, place, and time. He appears well-developed and well-nourished.   HENT:   Head: Normocephalic and atraumatic.   Cardiovascular: Normal rate, regular rhythm and normal heart sounds.   No murmur heard.  Pulmonary/Chest: Effort normal and breath sounds normal.   Neurological: He is alert and oriented to person, place, and time.   Psychiatric: He has a normal mood and affect.   Vitals reviewed.     Procedures    ACE III MINI             Assessment/Plan:    Agapito was seen today for hyperlipidemia, hypertension, coronary artery disease and prediabetes.    Diagnoses and all orders for this visit:    Essential hypertension    Coronary artery disease involving coronary bypass graft of native heart without angina pectoris    Prediabetes    Dyslipidemia    SSS (sick sinus syndrome) (CMS/[facility])    Acquired hypothyroidism    Plan     Blood pressure is well controlled on losartan.    Coronary artery disease is asymptomatic, continue aspirin, losartan and spironolactone.    A1c is well controlled at 6.2.  He will continue efforts at healthy diet and weight loss.    Lipids strides at 148, LDL 39 HDL 44.  Continue efforts at healthy diet and weight loss.    He will have pacemaker placed for sick sinus syndrome.    TSH is slightly elevated.  This is likely subclinical, but " if he does not feel better after pacemaker is placed would consider supplementation.      Plan of care reviewed with patient at the conclusion of today's visit. Education was provided regarding diagnosis, management, and any prescribed or recommended OTC medications.Patient verbalizes understanding of and agreement with management plan.         Kendrick Oconnor MD

## 2019-12-04 ENCOUNTER — HOSPITAL ENCOUNTER (OUTPATIENT)
Facility: HOSPITAL | Age: 84
Discharge: HOME | End: 2019-12-05
Attending: INTERNAL MEDICINE | Admitting: INTERNAL MEDICINE

## 2019-12-04 DIAGNOSIS — I49.5 SSS (SICK SINUS SYNDROME) (HCC): ICD-10-CM

## 2019-12-04 PROBLEM — E03.9 ACQUIRED HYPOTHYROIDISM: Status: ACTIVE | Noted: 2019-12-04

## 2019-12-04 PROCEDURE — C1785 PMKR, DUAL, RATE-RESP: HCPCS | Performed by: INTERNAL MEDICINE

## 2019-12-04 PROCEDURE — A9270 NON-COVERED ITEM OR SERVICE: HCPCS | Performed by: INTERNAL MEDICINE

## 2019-12-04 PROCEDURE — 25010000002 FENTANYL CITRATE (PF) 100 MCG/2ML SOLUTION: Performed by: INTERNAL MEDICINE

## 2019-12-04 PROCEDURE — 63710000001 SPIRONOLACTONE 25 MG TABLET: Performed by: INTERNAL MEDICINE

## 2019-12-04 PROCEDURE — 0 IOPAMIDOL PER 1 ML: Performed by: INTERNAL MEDICINE

## 2019-12-04 PROCEDURE — 33208 INSRT HEART PM ATRIAL & VENT: CPT | Performed by: INTERNAL MEDICINE

## 2019-12-04 PROCEDURE — 25010000002 MIDAZOLAM PER 1 MG: Performed by: INTERNAL MEDICINE

## 2019-12-04 PROCEDURE — 63710000001 ASPIRIN 81 MG TABLET DELAYED-RELEASE: Performed by: INTERNAL MEDICINE

## 2019-12-04 PROCEDURE — 25010000003 CEFAZOLIN IN DEXTROSE 2-4 GM/100ML-% SOLUTION: Performed by: PHYSICIAN ASSISTANT

## 2019-12-04 PROCEDURE — C1898 LEAD, PMKR, OTHER THAN TRANS: HCPCS | Performed by: INTERNAL MEDICINE

## 2019-12-04 PROCEDURE — C1892 INTRO/SHEATH,FIXED,PEEL-AWAY: HCPCS | Performed by: INTERNAL MEDICINE

## 2019-12-04 PROCEDURE — G0378 HOSPITAL OBSERVATION PER HR: HCPCS

## 2019-12-04 DEVICE — GEN PM ASSURITY MRI DR RF PM2272: Type: IMPLANTABLE DEVICE | Status: FUNCTIONAL

## 2019-12-04 DEVICE — LD PM TENDRIL STS 6F52CM 2088TC52: Type: IMPLANTABLE DEVICE | Status: FUNCTIONAL

## 2019-12-04 DEVICE — LD PM TENDRIL STS 6F58CM 2088TC58: Type: IMPLANTABLE DEVICE | Status: FUNCTIONAL

## 2019-12-04 RX ORDER — MIDAZOLAM HYDROCHLORIDE 1 MG/ML
INJECTION INTRAMUSCULAR; INTRAVENOUS AS NEEDED
Status: DISCONTINUED | OUTPATIENT
Start: 2019-12-04 | End: 2019-12-04 | Stop reason: HOSPADM

## 2019-12-04 RX ORDER — MORPHINE SULFATE 2 MG/ML
1 INJECTION, SOLUTION INTRAMUSCULAR; INTRAVENOUS EVERY 4 HOURS PRN
Status: DISCONTINUED | OUTPATIENT
Start: 2019-12-04 | End: 2019-12-05 | Stop reason: HOSPADM

## 2019-12-04 RX ORDER — SPIRONOLACTONE 25 MG/1
25 TABLET ORAL EVERY OTHER DAY
Status: DISCONTINUED | OUTPATIENT
Start: 2019-12-04 | End: 2019-12-05 | Stop reason: HOSPADM

## 2019-12-04 RX ORDER — FENTANYL CITRATE 50 UG/ML
INJECTION, SOLUTION INTRAMUSCULAR; INTRAVENOUS AS NEEDED
Status: DISCONTINUED | OUTPATIENT
Start: 2019-12-04 | End: 2019-12-04 | Stop reason: HOSPADM

## 2019-12-04 RX ORDER — LOSARTAN POTASSIUM 50 MG/1
50 TABLET ORAL DAILY
Status: DISCONTINUED | OUTPATIENT
Start: 2019-12-05 | End: 2019-12-05 | Stop reason: HOSPADM

## 2019-12-04 RX ORDER — TEMAZEPAM 7.5 MG/1
7.5 CAPSULE ORAL NIGHTLY PRN
Status: DISCONTINUED | OUTPATIENT
Start: 2019-12-04 | End: 2019-12-05 | Stop reason: HOSPADM

## 2019-12-04 RX ORDER — SODIUM CHLORIDE 9 MG/ML
250 INJECTION, SOLUTION INTRAVENOUS CONTINUOUS
Status: ACTIVE | OUTPATIENT
Start: 2019-12-04 | End: 2019-12-04

## 2019-12-04 RX ORDER — NALOXONE HCL 0.4 MG/ML
0.4 VIAL (ML) INJECTION
Status: DISCONTINUED | OUTPATIENT
Start: 2019-12-04 | End: 2019-12-05 | Stop reason: HOSPADM

## 2019-12-04 RX ORDER — OCTISALATE, AVOBENZONE, HOMOSALATE, AND OCTOCRYLENE 29.4; 29.4; 49; 25.48 MG/ML; MG/ML; MG/ML; MG/ML
1 LOTION TOPICAL DAILY
Status: ON HOLD | COMMUNITY
End: 2019-12-04

## 2019-12-04 RX ORDER — ASPIRIN 81 MG/1
81 TABLET ORAL DAILY
Status: DISCONTINUED | OUTPATIENT
Start: 2019-12-04 | End: 2019-12-05 | Stop reason: HOSPADM

## 2019-12-04 RX ORDER — CEFAZOLIN SODIUM 2 G/100ML
2 INJECTION, SOLUTION INTRAVENOUS ONCE
Status: COMPLETED | OUTPATIENT
Start: 2019-12-04 | End: 2019-12-04

## 2019-12-04 RX ORDER — LABETALOL HYDROCHLORIDE 5 MG/ML
20 INJECTION, SOLUTION INTRAVENOUS ONCE
Status: COMPLETED | OUTPATIENT
Start: 2019-12-04 | End: 2019-12-04

## 2019-12-04 RX ORDER — LIDOCAINE HYDROCHLORIDE 10 MG/ML
INJECTION, SOLUTION EPIDURAL; INFILTRATION; INTRACAUDAL; PERINEURAL AS NEEDED
Status: DISCONTINUED | OUTPATIENT
Start: 2019-12-04 | End: 2019-12-04 | Stop reason: HOSPADM

## 2019-12-04 RX ORDER — ALPRAZOLAM 0.5 MG/1
0.5 TABLET ORAL DAILY PRN
Status: DISCONTINUED | OUTPATIENT
Start: 2019-12-04 | End: 2019-12-05 | Stop reason: HOSPADM

## 2019-12-04 RX ORDER — ONDANSETRON 2 MG/ML
4 INJECTION INTRAMUSCULAR; INTRAVENOUS EVERY 6 HOURS PRN
Status: DISCONTINUED | OUTPATIENT
Start: 2019-12-04 | End: 2019-12-05 | Stop reason: HOSPADM

## 2019-12-04 RX ADMIN — LABETALOL 20 MG/4 ML (5 MG/ML) INTRAVENOUS SYRINGE 20 MG: at 11:45

## 2019-12-04 RX ADMIN — SPIRONOLACTONE 25 MG: 25 TABLET ORAL at 19:03

## 2019-12-04 RX ADMIN — ASPIRIN 81 MG: 81 TABLET, COATED ORAL at 19:03

## 2019-12-04 RX ADMIN — CEFAZOLIN SODIUM 2 G: 2 INJECTION, SOLUTION INTRAVENOUS at 15:44

## 2019-12-04 NOTE — H&P
Pre-PPM Report  Cardiovascular Laboratory  [facility]      Patient:  Agapito Downey  :  1935  PCP:  Kendrick Oconnor MD  PHONE:  [phone]    DATE: 2019    BRIEF HPI:  Agapito Downey is a 84 y.o. male with hypertension, hypercholesterolemia, prostate cancer, and known coronary artery disease.  He is post CABG from .  He is complaining of ongoing palpitations with dizzy spells.  He was recently found to be in a high degree AV block.  He now presents for permanent pacemaker placement.    Cardiac Risk Factors:  advanced age (older than 55 for men, 65 for women), dyslipidemia, family history of premature cardiovascular disease, hypertension, male gender, obesity (BMI >= 30 kg/m2)    Anginal class in last 2 weeks:  No anginal symptoms    CHF Class in last 2 weeks:  NYHA Class I    Cardiogenic shock:  no    Cardiac arrest <24 hours:  no    Stress test within last 6 months:   no   Details:    Previous cardiac catheterization:  yes  Details:     Previous CABG:  yes  Details:      Allergies:     IV contrast allergy:  no  Allergies   Allergen Reactions   • Reserpine Other (See Comments)     depression   • Statins Other (See Comments)     Myalgias and mood affect disorder  fatigue         MEDICATIONS:  Prior to Admission medications    Medication Sig Start Date End Date Taking? Authorizing Provider   Probiotic Product (ALIGN) 4 MG capsule Take 1 capsule by mouth Daily.   Yes Lan Vanegas MD   ALPRAZolam (XANAX) 0.5 MG tablet Take 1 tablet by mouth As Needed for Anxiety. 19   Kendrick Oconnor MD   aspirin 81 MG EC tablet Take 81 mg by mouth daily.    ProviderLan MD   Cholecalciferol (VITAMIN D3) 1.25 MG (69613 UT) capsule Take 1 capsule by mouth 1 (One) Time Per Week. 18   Lan Vanegas MD   Evolocumab (REPATHA SC) Inject  under the skin into the appropriate area as directed.    Emergency, Nurse Kerry, RN   fluticasone (VERAMYST) 27.5  MCG/SPRAY nasal spray 2 sprays into each nostril Daily.    Lan Vanegas MD   losartan (COZAAR) 50 MG tablet Take 1 tablet by mouth Daily. 5/28/19   Kendrick Oconnor MD   Magnesium 400 MG capsule Take 1 capsule by mouth Daily.    Lan Vanegas MD   Multiple Vitamins-Minerals (CENTRUM ULTRA MENS PO) Take 1 tablet by mouth Daily.    Lan Vanegas MD   Omega-3 1000 MG capsule Take 1 capsule by mouth Daily.    Lan Vanegas MD   psyllium (METAMUCIL) 58.6 % packet Take 1 packet by mouth Daily.    Lan Vanegas MD   spironolactone (ALDACTONE) 25 MG tablet TAKE ONE TABLET BY MOUTH EVERY OTHER DAY 11/8/19   Kendrick Oconnor MD   cholecalciferol (VITAMIN D3) 1000 units tablet Take 5,000 Units by mouth Daily.  12/4/19  Lan Vanegas MD   Probiotic Product (PROBIOTIC ADVANCED PO) Take 1 tablet by mouth Daily.  12/4/19  Lan Vanegas MD       Past medical & surgical history, social and family history reviewed in the electronic medical record.    ROS:  Cardiovascular ROS: positive for - palpitations and dizziness    Physical Exam:    Vitals: There were no vitals filed for this visit. There were no vitals filed for this visit.    General Appearance:    Alert, cooperative, in no acute distress   Head:    Normocephalic, without obvious abnormality, atraumatic   Eyes:            Lids and lashes normal, conjunctivae and sclerae normal, no   icterus, no pallor, corneas clear, PERRLA   Ears:    Ears appear intact with no abnormalities noted   Neck:   No adenopathy, supple, trachea midline, no thyromegaly, no   carotid bruit, no JVD   Back:     No kyphosis present, no scoliosis present, range of motion normal   Lungs:     Clear to auscultation,respirations regular, even and                  unlabored    Heart:    Regular rhythm and ericka rate, normal S1 and S2, 1/6            murmur, no gallop, no rub, no click   Chest Wall:    No abnormalities observed   Abdomen:      Normal bowel sounds, no masses, no organomegaly, soft        non-tender, non-distended, no guarding, no rebound                tenderness   Rectal:     Deferred   Extremities:   Moves all extremities well, no edema, no cyanosis, no             redness   Pulses:   Pulses palpable and equal bilaterally   Skin:   No bleeding, bruising or rash   Neurologic:   Cranial nerves 2 - 12 grossly intact, sensation intact     Barbaeu Test:  Left: Not Assessed  (oxymetric Allens) Right: Not Assessed             Lab Results   Component Value Date    CHLPL 113 11/25/2019    TRIG 148 11/25/2019    HDL 44 11/25/2019    AST 15 11/25/2019    ALT 17 11/25/2019           Impression      · Sick sinus syndrome    Plan     · Procedure to perform: Permanent pacemaker placement  · Planned access: Left infraclavicular              GLEN Pritchett  12/04/19  11:13 AM

## 2019-12-05 ENCOUNTER — APPOINTMENT (OUTPATIENT)
Dept: GENERAL RADIOLOGY | Facility: HOSPITAL | Age: 84
End: 2019-12-05

## 2019-12-05 VITALS
DIASTOLIC BLOOD PRESSURE: 100 MMHG | OXYGEN SATURATION: 93 % | HEIGHT: 68 IN | SYSTOLIC BLOOD PRESSURE: 155 MMHG | TEMPERATURE: 97.1 F | BODY MASS INDEX: 30.43 KG/M2 | HEART RATE: 63 BPM | RESPIRATION RATE: 16 BRPM | WEIGHT: 200.8 LBS

## 2019-12-05 PROCEDURE — 63710000001 LOSARTAN 50 MG TABLET: Performed by: INTERNAL MEDICINE

## 2019-12-05 PROCEDURE — 71046 X-RAY EXAM CHEST 2 VIEWS: CPT

## 2019-12-05 PROCEDURE — G0378 HOSPITAL OBSERVATION PER HR: HCPCS

## 2019-12-05 PROCEDURE — A9270 NON-COVERED ITEM OR SERVICE: HCPCS | Performed by: INTERNAL MEDICINE

## 2019-12-05 RX ADMIN — LOSARTAN POTASSIUM 50 MG: 50 TABLET, FILM COATED ORAL at 09:01

## 2019-12-05 NOTE — PLAN OF CARE
Problem: Patient Care Overview  Goal: Plan of Care Review  Outcome: Ongoing (interventions implemented as appropriate)   12/04/19 9959   Coping/Psychosocial   Plan of Care Reviewed With patient   Plan of Care Review   Progress improving   OTHER   Outcome Summary Pt had PPM implant w/ Dr. Campbell today. Pt incision is stable w/ no signs of bleeding or swelling. Pt is resting comfortably. Pt VSS. Pt alert/oriented x3. Continuing to monitor. Continuing POC.        Problem: Cardiac Rhythm Management Device (Adult)  Goal: Signs and Symptoms of Listed Potential Problems Will be Absent, Minimized or Managed (Cardiac Rhythm Management Device)  Outcome: Ongoing (interventions implemented as appropriate)

## 2019-12-05 NOTE — DISCHARGE SUMMARY
Date of Discharge:  12/5/2019              [facility]  Date of Admit: 12/4/2019    Kendrick Oconnor MD      Discharge Diagnosis:  SSS (sick sinus syndrome) (CMS/[facility])      Hospital Course: Mr. Downey is a pleasant 84-year-old male who was admitted to [facility] on 12/4/2019 due to sick sinus syndrome.  He underwent placement of a [employer] dual-chamber permanent pacemaker.  He tolerated the procedure well, there were no complications.  Chest x-ray reveals excellent lead placement with no pneumothorax.  Today he is denying any complaints and is therefore felt ready for discharge.    Procedures Performed  Procedure(s):  Device Implant     PPM IMPLANT       Consults     No orders found for last 30 day(s).          Pertinent Test Results: SJM DDD-PPM placement  .      Discharge Physical Exam:    General Appearance No acute distress   Neck No adenopathy, supple, trachea midline, no JVD   Lungs Clear to auscultation,respirations regular, even and unlabored   Heart Regular rhythm and normal rate, normal S1 and S2, no murmur, no gallop, no rub, no click   Chest wall No abnormalities observed, ppm site c/d/i   Abdomen Normal bowel sounds, no masses, no hepatomegaly, soft   Extremities Moves all extremities well, no edema, no cyanosis, no redness   Neurological Alert and oriented x 3     Discharge Medications     Discharge Medications      Continue These Medications      Instructions Start Date   ALPRAZolam 0.5 MG tablet  Commonly known as:  XANAX   0.5 mg, Oral, As Needed      aspirin 81 MG EC tablet   81 mg, Oral, Daily      losartan 50 MG tablet  Commonly known as:  COZAAR   50 mg, Oral, Daily      spironolactone 25 MG tablet  Commonly known as:  ALDACTONE   TAKE ONE TABLET BY MOUTH EVERY OTHER DAY             Discharge Diet: cardiac    Activity at Discharge: as tolerated    Discharge disposition: home    Condition on Discharge: stable    Follow-up Appointments  Future  Appointments   Date Time Provider Department Center   6/3/2020 10:00 AM Mercedes Pringle APRN [facility]   6/10/2020 10:15 AM Kendrick Oconnor MD [facility]     Additional Instructions for the Follow-ups that You Need to Schedule     Discharge Follow-up with Specified Provider: Dr. Campbell   As directed      To:  Dr. Campbell    Follow Up Details:  10 days for wound check                  GLEN Pritchett  12/05/19  8:15 AM

## 2020-02-03 RX ORDER — SPIRONOLACTONE 25 MG/1
TABLET ORAL
Qty: 45 TABLET | Refills: 0 | Status: SHIPPED | OUTPATIENT
Start: 2020-02-03 | End: 2020-05-13

## 2020-03-11 RX ORDER — METHOCARBAMOL 750 MG/1
TABLET ORAL
Qty: 12 CAPSULE | Refills: 4 | Status: SHIPPED | OUTPATIENT
Start: 2020-03-11 | End: 2021-04-14

## 2020-05-13 RX ORDER — SPIRONOLACTONE 25 MG/1
TABLET ORAL
Qty: 45 TABLET | Refills: 0 | Status: SHIPPED | OUTPATIENT
Start: 2020-05-13 | End: 2020-08-10

## 2020-06-03 ENCOUNTER — OFFICE VISIT (OUTPATIENT)
Dept: INTERNAL MEDICINE | Facility: CLINIC | Age: 85
End: 2020-06-03

## 2020-06-03 VITALS
HEIGHT: 68 IN | BODY MASS INDEX: 30.71 KG/M2 | HEART RATE: 78 BPM | TEMPERATURE: 98.1 F | DIASTOLIC BLOOD PRESSURE: 80 MMHG | WEIGHT: 202.6 LBS | SYSTOLIC BLOOD PRESSURE: 140 MMHG

## 2020-06-03 DIAGNOSIS — E03.9 ACQUIRED HYPOTHYROIDISM: ICD-10-CM

## 2020-06-03 DIAGNOSIS — I10 ESSENTIAL HYPERTENSION: ICD-10-CM

## 2020-06-03 DIAGNOSIS — E78.2 MIXED HYPERLIPIDEMIA: ICD-10-CM

## 2020-06-03 DIAGNOSIS — Z79.899 LONG-TERM USE OF HIGH-RISK MEDICATION: ICD-10-CM

## 2020-06-03 DIAGNOSIS — R73.03 PREDIABETES: ICD-10-CM

## 2020-06-03 DIAGNOSIS — Z00.00 MEDICARE ANNUAL WELLNESS VISIT, SUBSEQUENT: Primary | ICD-10-CM

## 2020-06-03 DIAGNOSIS — I10 ESSENTIAL HYPERTENSION: Primary | ICD-10-CM

## 2020-06-03 DIAGNOSIS — M81.0 AGE-RELATED OSTEOPOROSIS WITHOUT CURRENT PATHOLOGICAL FRACTURE: ICD-10-CM

## 2020-06-03 PROCEDURE — G0439 PPPS, SUBSEQ VISIT: HCPCS | Performed by: NURSE PRACTITIONER

## 2020-06-03 PROCEDURE — 96160 PT-FOCUSED HLTH RISK ASSMT: CPT | Performed by: NURSE PRACTITIONER

## 2020-06-03 RX ORDER — METRONIDAZOLE 500 MG/1
1 TABLET ORAL EVERY 12 HOURS
COMMUNITY
Start: 2018-12-13 | End: 2020-10-07

## 2020-06-03 RX ORDER — MAGNESIUM OXIDE 400 MG/1
400 TABLET ORAL DAILY
COMMUNITY

## 2020-06-03 RX ORDER — AMLODIPINE BESYLATE 5 MG/1
1 TABLET ORAL DAILY
COMMUNITY
Start: 2018-10-04 | End: 2020-06-10

## 2020-06-03 RX ORDER — KETOTIFEN FUMARATE 0.35 MG/ML
1 SOLUTION/ DROPS OPHTHALMIC DAILY PRN
COMMUNITY
Start: 2016-06-16

## 2020-06-03 RX ORDER — OCTISALATE, AVOBENZONE, HOMOSALATE, AND OCTOCRYLENE 29.4; 29.4; 49; 25.48 MG/ML; MG/ML; MG/ML; MG/ML
1 LOTION TOPICAL DAILY
COMMUNITY
Start: 2015-12-17

## 2020-06-03 NOTE — PATIENT INSTRUCTIONS
Medicare Wellness  Personal Prevention Plan of Service     Date of Office Visit:  2020  Encounter Provider:  KIRAN Gutiérrez  Place of Service:  [facility]  Patient Name: Agapito Downey  :  1935    As part of the Medicare Wellness portion of your visit today, we are providing you with this personalized preventive plan of services (PPPS). This plan is based upon recommendations of the [place] Preventive Services Task Force (USPSTF) and the Advisory Committee on Immunization Practices (ACIP).    This lists the preventive care services that should be considered, and provides dates of when you are due. Items listed as completed are up-to-date and do not require any further intervention.    Health Maintenance   Topic Date Due   • Pneumococcal Vaccine Once at 65 Years Old  2002   • DXA SCAN  2019   • ZOSTER VACCINE (3 of 3) 2020   • MEDICARE ANNUAL WELLNESS  2020   • INFLUENZA VACCINE  2020   • LIPID PANEL  2020   • TDAP/TD VACCINES (3 - Td) 2028       Orders Placed This Encounter   Procedures   • Hemoglobin A1c     Standing Status:   Future     Standing Expiration Date:   6/3/2021       Return for Next scheduled follow up, Labs this visit, Annual physical.      BMI for Adults    Body mass index (BMI) is a number that is calculated from a person's weight and height. BMI may help to estimate how much of a person's weight is composed of fat. BMI can help identify those who may be at higher risk for certain medical problems.  How is BMI used with adults?  BMI is used as a screening tool to identify possible weight problems. It is used to check whether a person is obese, overweight, healthy weight, or underweight.  How is BMI calculated?  BMI measures your weight and compares it to your height. This can be done either in English (U.S[place] or metric measurements. Note that charts are available to help you find your BMI  "quickly and easily without having to do these calculations yourself.  To calculate your BMI in English (U.S.) measurements, your health care provider will:  1. Measure your weight in pounds (lb).  2. Multiply the number of pounds by 703.  ? For example, for a person who weighs 180 lb, multiply that number by 703, which equals 126,540.  3. Measure your height in inches (in). Then multiply that number by itself to get a measurement called \"inches squared.\"  ? For example, for a person who is 70 in tall, the \"inches squared\" measurement is 70 in x 70 in, which equals 4900 inches squared.  4. Divide the total from Step 2 (number of lb x 703) by the total from Step 3 (inches squared): 126,540 ÷ 4900 = 25.8. This is your BMI.  To calculate your BMI in metric measurements, your health care provider will:  1. Measure your weight in kilograms (kg).  2. Measure your height in meters (m). Then multiply that number by itself to get a measurement called \"meters squared.\"  ? For example, for a person who is 1.75 m tall, the \"meters squared\" measurement is 1.75 m x 1.75 m, which is equal to 3.1 meters squared.  3. Divide the number of kilograms (your weight) by the meters squared number. In this example: 70 ÷ 3.1 = 22.6. This is your BMI.  How is BMI interpreted?  To interpret your results, your health care provider will use BMI charts to identify whether you are underweight, normal weight, overweight, or obese. The following guidelines will be used:  · Underweight: BMI less than 18.5.  · Normal weight: BMI between 18.5 and 24.9.  · Overweight: BMI between 25 and 29.9.  · Obese: BMI of 30 and above.  Please note:  · Weight includes both fat and muscle, so someone with a muscular build, such as an athlete, may have a BMI that is higher than 24.9. In cases like these, BMI is not an accurate measure of body fat.  · To determine if excess body fat is the cause of a BMI of 25 or higher, further assessments may need to be done by a " health care provider.  · BMI is usually interpreted in the same way for men and women.  Why is BMI a useful tool?  BMI is useful in two ways:  · Identifying a weight problem that may be related to a medical condition, or that may increase the risk for medical problems.  · Promoting lifestyle and diet changes in order to reach a healthy weight.  Summary  · Body mass index (BMI) is a number that is calculated from a person's weight and height.  · BMI may help to estimate how much of a person's weight is composed of fat. BMI can help identify those who may be at higher risk for certain medical problems.  · BMI can be measured using English measurements or metric measurements.  · To interpret your results, your health care provider will use BMI charts to identify whether you are underweight, normal weight, overweight, or obese.  This information is not intended to replace advice given to you by your health care provider. Make sure you discuss any questions you have with your health care provider.  Document Released: 08/29/2005 Document Revised: 11/30/2018 Document Reviewed: 10/31/2018  Elsevier Patient Education © 2020 [employer]

## 2020-06-03 NOTE — PROGRESS NOTES
QUICK REFERENCE INFORMATION:  The ABCs of the Annual Wellness Visit    Subsequent Medicare Wellness Visit    HEALTH RISK ASSESSMENT    1935    Recent Hospitalizations:  Recently treated at the following:  [facility] for pacemaker with Dr. Campbell.        Current Medical Providers:  Patient Care Team:  Kendrick Oconnor MD as PCP - General        Smoking Status:  Social History     Tobacco Use   Smoking Status Never Smoker   Smokeless Tobacco Never Used       Alcohol Consumption:  Social History     Substance and Sexual Activity   Alcohol Use No       Depression Screen:   PHQ-2/PHQ-9 Depression Screening 6/3/2020   Little interest or pleasure in doing things 0   Feeling down, depressed, or hopeless 0   Total Score 0       Health Habits and Functional and Cognitive Screening:  Functional & Cognitive Status 6/3/2020   Do you have difficulty preparing food and eating? No   Do you have difficulty bathing yourself, getting dressed or grooming yourself? No   Do you have difficulty using the toilet? No   Do you have difficulty moving around from place to place? No   Do you have trouble with steps or getting out of a bed or a chair? No   Current Diet Well Balanced Diet   Dental Exam Up to date   Eye Exam Up to date   Exercise (times per week) 0 times per week   Current Exercise Activities Include None   Do you need help using the phone?  No   Are you deaf or do you have serious difficulty hearing?  No   Do you need help with transportation? No   Do you need help shopping? No   Do you need help preparing meals?  No   Do you need help with housework?  No   Do you need help with laundry? No   Do you need help taking your medications? No   Do you need help managing money? No   Do you ever drive or ride in a car without wearing a seat belt? No   Have you felt unusual stress, anger or loneliness in the last month? No   Who do you live with? Spouse   If you need help, do you have trouble finding someone  available to you? No   Have you been bothered in the last four weeks by sexual problems? No   Do you have difficulty concentrating, remembering or making decisions? No       Fall Risk Screen:  RACIEL Fall Risk Assessment was completed, and patient is at LOW risk for falls.Assessment completed on:6/3/2020    ACE III MINI   ATTENTION  What is the year: correct  What is the month of the year: correct  What is the day of the week?: correct  What is the date?: correct  MEMORY  Repeat address three times, only score third attempt: [address] [place], [place]: 7  HOW MANY ANIMALS DID THE PATIENT NAME  Verbal Fluency -- Animal Names (0-25): 22+  CLOCK DRAWING  Clock Drawing: All Correct  MEMORY RECALL  Tell me what you remember about that name and address we were repeating at the beginnin  ACE TOTAL SCORE  Total ACE Score - <25/30 strongly suggests cognitive impairment; <21/30 almost certainly shows dementia: 30    Does the patient have evidence of cognitive impairment? No    Aspirin use counseling: Taking ASA appropriately as indicated    Recent Lab Results:  CMP:  Lab Results   Component Value Date     (H) 2020    BUN 22 2020    CREATININE 1.20 2020    EGFRIFNONA 58 (L) 2020    EGFRIFAFRI 70 2020    BCR 18.3 2020     2020    K 4.6 2020    CO2 23.6 2020    CALCIUM 9.3 2020    PROTENTOTREF 7.4 2020    ALBUMIN 4.50 2020    LABGLOBREF 2.9 2020    LABIL2 1.6 2020    BILITOT 0.7 2020    ALKPHOS 49 2020    AST 14 2020    ALT 19 2020     HbA1c:  Lab Results   Component Value Date    HGBA1C 6.50 (H) 2020    HGBA1C 6.20 (H) 2019     Microalbumin:  Lab Results   Component Value Date    MICROALBUR 7.5 2020     Lipid Panel  Lab Results   Component Value Date    TRIG 201 (H) 2020    HDL 46 2020    LDL 69 2020    AST 14 2020    ALT 19 2020        Visual Acuity:  No exam data present    Age-appropriate Screening Schedule:  Refer to the list below for future screening recommendations based on patient's age, sex and/or medical conditions. Orders for these recommended tests are listed in the plan section. The patient has been provided with a written plan.    Health Maintenance   Topic Date Due   • DXA SCAN  05/20/2019   • ZOSTER VACCINE (3 of 3) 03/19/2020   • INFLUENZA VACCINE  08/01/2020   • LIPID PANEL  06/04/2021   • TDAP/TD VACCINES (3 - Td) 02/16/2028        Subjective   History of Present Illness    Agapito Downey is a 85 y.o. male who presents for a Subsequent Wellness Visit.    CHRONIC CONDITIONS    The following portions of the patient's history were reviewed and updated as appropriate: allergies, current medications, past family history, past medical history, past social history, past surgical history and problem list.    Outpatient Medications Prior to Visit   Medication Sig Dispense Refill   • ALPRAZolam (XANAX) 0.5 MG tablet Take 1 tablet by mouth As Needed for Anxiety. 10 tablet 0   • aspirin 81 MG EC tablet Take 81 mg by mouth daily.     • D3-50 1.25 MG (01077 UT) capsule TAKE 1 CAPSULE BY MOUTH ONCE A WEEK. 12 capsule 4   • Evolocumab (Repatha SureClick) 140 MG/ML solution auto-injector Inject 1 mL under the skin into the appropriate area as directed Every 14 (Fourteen) Days.     • ketotifen (Zaditor) 0.025 % ophthalmic solution Apply  to eye(s) as directed by provider.     • losartan (COZAAR) 50 MG tablet Take 1 tablet by mouth Daily.     • magnesium oxide (MAG-OX) 400 MG tablet Take 400 mg by mouth Daily.     • metroNIDAZOLE (FLAGYL) 500 MG tablet Take 1 tablet by mouth Every 12 (Twelve) Hours. Only uses for diverticulitis     • Multiple Vitamins-Minerals (CENTRUM SILVER 50+MEN PO) Take 1 tablet by mouth Daily.     • polyethyl glycol-propyl glycol (SYSTANE) 0.4-0.3 % solution ophthalmic solution Apply  to eye(s) as directed by  provider.     • Probiotic Product (ALIGN) 4 MG capsule Take  by mouth.     • spironolactone (ALDACTONE) 25 MG tablet TAKE ONE TABLET BY MOUTH EVERY OTHER DAY 45 tablet 0   • amLODIPine (NORVASC) 5 MG tablet Take 1 tablet by mouth Daily.       No facility-administered medications prior to visit.        Patient Active Problem List   Diagnosis   • Coronary artery disease involving coronary bypass graft of native heart without angina pectoris   • AV block, 1st degree   • Essential hypertension   • Hyperlipemia   • Dyslipidemia   • Osteoarthritis   • HTN (hypertension), benign   • Other fatigue   • Allergic rhinitis   • ASHD (arteriosclerotic heart disease)   • Angina pectoris (CMS/HCC)   • History of malignant neoplasm of prostate   • Snoring   • Abnormal glucose level   • Osteoporosis   • Obesity   • Senile osteoporosis   • Irritable bowel syndrome   • Mobitz type 1 second degree AV block   • Prediabetes   • SSS (sick sinus syndrome) (CMS/HCC)   • Acquired hypothyroidism       Advance Care Planning:  ACP discussion was held with the patient during this visit. Patient does not have an advance directive, declines further assistance.    Identification of Risk Factors:  Risk factors include: Immunizations Discussed/Encouraged (specific immunizations; none ).    Review of Systems   Constitutional: Negative for chills, fatigue and fever.   HENT: Negative for congestion, ear pain and sinus pressure.    Respiratory: Negative for cough, chest tightness, shortness of breath and wheezing.    Cardiovascular: Negative for chest pain and palpitations.   Gastrointestinal: Negative for abdominal pain and blood in stool.   Skin: Negative for color change.   Allergic/Immunologic: Negative for environmental allergies.   Neurological: Negative for dizziness, speech difficulty and headaches.   Psychiatric/Behavioral: Negative for decreased concentration. The patient is not nervous/anxious.        Compared to one year ago, the patient feels  "his physical health is the same.  Compared to one year ago, the patient feels his mental health is the same.    Objective     Physical Exam   Constitutional: He is oriented to person, place, and time. He appears well-developed and well-nourished.   Cardiovascular: Normal rate.   Pulmonary/Chest: Effort normal.   Neurological: He is alert and oriented to person, place, and time.   Skin: Skin is warm and dry.   Psychiatric: He has a normal mood and affect. His behavior is normal. Judgment and thought content normal.   Vitals reviewed.       Procedures     Vitals:    06/03/20 1020   BP: 140/80   BP Location: Right arm   Patient Position: Sitting   Cuff Size: Adult   Pulse: 78   Temp: 98.1 °F (36.7 °C)   TempSrc: Temporal   Weight: 91.9 kg (202 lb 9.6 oz)   Height: 172.7 cm (67.99\")   PainSc: 0-No pain       Patient's Body mass index is 30.81 kg/m². BMI is above normal parameters. Recommendations include: educational material.      Assessment/Plan   Problem List Items Addressed This Visit        Cardiovascular and Mediastinum    Essential hypertension    Overview     benign         Relevant Medications    losartan (COZAAR) 50 MG tablet    spironolactone (ALDACTONE) 25 MG tablet    Hyperlipemia    Overview     mixed         Relevant Medications    Evolocumab (Repatha SureClick) 140 MG/ML solution auto-injector       Endocrine    Prediabetes    Relevant Orders    Hemoglobin A1c (Completed)      Other Visit Diagnoses     Medicare annual wellness visit, subsequent    -  Primary        Patient Self-Management and Personalized Health Advice  The patient has been provided with information about: diet and exercise and preventive services including:   · Annual Wellness Visit (AWV).    Outpatient Encounter Medications as of 6/3/2020   Medication Sig Dispense Refill   • ALPRAZolam (XANAX) 0.5 MG tablet Take 1 tablet by mouth As Needed for Anxiety. 10 tablet 0   • aspirin 81 MG EC tablet Take 81 mg by mouth daily.     • D3-50 1.25 " MG (36216 UT) capsule TAKE 1 CAPSULE BY MOUTH ONCE A WEEK. 12 capsule 4   • Evolocumab (Repatha SureClick) 140 MG/ML solution auto-injector Inject 1 mL under the skin into the appropriate area as directed Every 14 (Fourteen) Days.     • ketotifen (Zaditor) 0.025 % ophthalmic solution Apply  to eye(s) as directed by provider.     • losartan (COZAAR) 50 MG tablet Take 1 tablet by mouth Daily.     • magnesium oxide (MAG-OX) 400 MG tablet Take 400 mg by mouth Daily.     • metroNIDAZOLE (FLAGYL) 500 MG tablet Take 1 tablet by mouth Every 12 (Twelve) Hours. Only uses for diverticulitis     • Multiple Vitamins-Minerals (CENTRUM SILVER 50+MEN PO) Take 1 tablet by mouth Daily.     • polyethyl glycol-propyl glycol (SYSTANE) 0.4-0.3 % solution ophthalmic solution Apply  to eye(s) as directed by provider.     • Probiotic Product (ALIGN) 4 MG capsule Take  by mouth.     • spironolactone (ALDACTONE) 25 MG tablet TAKE ONE TABLET BY MOUTH EVERY OTHER DAY 45 tablet 0   • [DISCONTINUED] amLODIPine (NORVASC) 5 MG tablet Take 1 tablet by mouth Daily.       No facility-administered encounter medications on file as of 6/3/2020.        Reviewed use of high risk medication in the elderly: yes  Reviewed for potential of harmful drug interactions in the elderly: yes    Follow Up:  Return for Next scheduled follow up, Labs this visit, Annual physical.     Patient Instructions       Medicare Wellness  Personal Prevention Plan of Service     Date of Office Visit:  2020  Encounter Provider:  KIRAN Gutiérrez  Place of Service:  [facility]  Patient Name: Agapito Downey  :  1935    As part of the Medicare Wellness portion of your visit today, we are providing you with this personalized preventive plan of services (PPPS). This plan is based upon recommendations of the [place] Preventive Services Task Force (USPSTF) and the Advisory Committee on Immunization Practices (ACIP).    This  "lists the preventive care services that should be considered, and provides dates of when you are due. Items listed as completed are up-to-date and do not require any further intervention.    Health Maintenance   Topic Date Due   • Pneumococcal Vaccine Once at 65 Years Old  05/19/2002   • DXA SCAN  05/20/2019   • ZOSTER VACCINE (3 of 3) 03/19/2020   • MEDICARE ANNUAL WELLNESS  05/20/2020   • INFLUENZA VACCINE  08/01/2020   • LIPID PANEL  11/25/2020   • TDAP/TD VACCINES (3 - Td) 02/16/2028       Orders Placed This Encounter   Procedures   • Hemoglobin A1c     Standing Status:   Future     Standing Expiration Date:   6/3/2021       Return for Next scheduled follow up, Labs this visit, Annual physical.      BMI for Adults    Body mass index (BMI) is a number that is calculated from a person's weight and height. BMI may help to estimate how much of a person's weight is composed of fat. BMI can help identify those who may be at higher risk for certain medical problems.  How is BMI used with adults?  BMI is used as a screening tool to identify possible weight problems. It is used to check whether a person is obese, overweight, healthy weight, or underweight.  How is BMI calculated?  BMI measures your weight and compares it to your height. This can be done either in English (U.S.) or metric measurements. Note that charts are available to help you find your BMI quickly and easily without having to do these calculations yourself.  To calculate your BMI in English (U.S.) measurements, your health care provider will:  1. Measure your weight in pounds (lb).  2. Multiply the number of pounds by 703.  ? For example, for a person who weighs 180 lb, multiply that number by 703, which equals 126,540.  3. Measure your height in inches (in). Then multiply that number by itself to get a measurement called \"inches squared.\"  ? For example, for a person who is 70 in tall, the \"inches squared\" measurement is 70 in x 70 in, which equals " "4900 inches squared.  4. Divide the total from Step 2 (number of lb x 703) by the total from Step 3 (inches squared): 126,540 ÷ 4900 = 25.8. This is your BMI.  To calculate your BMI in metric measurements, your health care provider will:  1. Measure your weight in kilograms (kg).  2. Measure your height in meters (m). Then multiply that number by itself to get a measurement called \"meters squared.\"  ? For example, for a person who is 1.75 m tall, the \"meters squared\" measurement is 1.75 m x 1.75 m, which is equal to 3.1 meters squared.  3. Divide the number of kilograms (your weight) by the meters squared number. In this example: 70 ÷ 3.1 = 22.6. This is your BMI.  How is BMI interpreted?  To interpret your results, your health care provider will use BMI charts to identify whether you are underweight, normal weight, overweight, or obese. The following guidelines will be used:  · Underweight: BMI less than 18.5.  · Normal weight: BMI between 18.5 and 24.9.  · Overweight: BMI between 25 and 29.9.  · Obese: BMI of 30 and above.  Please note:  · Weight includes both fat and muscle, so someone with a muscular build, such as an athlete, may have a BMI that is higher than 24.9. In cases like these, BMI is not an accurate measure of body fat.  · To determine if excess body fat is the cause of a BMI of 25 or higher, further assessments may need to be done by a health care provider.  · BMI is usually interpreted in the same way for men and women.  Why is BMI a useful tool?  BMI is useful in two ways:  · Identifying a weight problem that may be related to a medical condition, or that may increase the risk for medical problems.  · Promoting lifestyle and diet changes in order to reach a healthy weight.  Summary  · Body mass index (BMI) is a number that is calculated from a person's weight and height.  · BMI may help to estimate how much of a person's weight is composed of fat. BMI can help identify those who may be at higher " risk for certain medical problems.  · BMI can be measured using English measurements or metric measurements.  · To interpret your results, your health care provider will use BMI charts to identify whether you are underweight, normal weight, overweight, or obese.  This information is not intended to replace advice given to you by your health care provider. Make sure you discuss any questions you have with your health care provider.  Document Released: 08/29/2005 Document Revised: 11/30/2018 Document Reviewed: 10/31/2018  [employer] Patient Education © 2020 [employer]        An After Visit Summary and PPPS with all of these plans were given to the patient.         Answers for HPI/ROS submitted by the patient on 6/1/2020   What is the primary reason for your visit?: Other  Please describe your symptoms.: annual physical  Have you had these symptoms before?: Yes  How long have you been having these symptoms?: Greater than 2 weeks  Please list any medications you are currently taking for this condition.: losartan,spironolactone align,vit D  Please describe any probable cause for these symptoms. : old age

## 2020-06-04 ENCOUNTER — LAB (OUTPATIENT)
Dept: LAB | Facility: HOSPITAL | Age: 85
End: 2020-06-04

## 2020-06-04 DIAGNOSIS — M81.0 AGE-RELATED OSTEOPOROSIS WITHOUT CURRENT PATHOLOGICAL FRACTURE: ICD-10-CM

## 2020-06-04 DIAGNOSIS — R73.03 PREDIABETES: ICD-10-CM

## 2020-06-04 DIAGNOSIS — E78.2 MIXED HYPERLIPIDEMIA: ICD-10-CM

## 2020-06-04 DIAGNOSIS — I10 ESSENTIAL HYPERTENSION: ICD-10-CM

## 2020-06-04 DIAGNOSIS — Z79.899 LONG-TERM USE OF HIGH-RISK MEDICATION: ICD-10-CM

## 2020-06-04 DIAGNOSIS — E03.9 ACQUIRED HYPOTHYROIDISM: ICD-10-CM

## 2020-06-06 LAB
25(OH)D3+25(OH)D2 SERPL-MCNC: 87.3 NG/ML (ref 30–100)
ALBUMIN SERPL-MCNC: 4.5 G/DL (ref 3.5–5.2)
ALBUMIN/CREAT UR: 3 MG/G CREAT (ref 0–29)
ALBUMIN/GLOB SERPL: 1.6 G/DL
ALP SERPL-CCNC: 49 U/L (ref 39–117)
ALT SERPL-CCNC: 19 U/L (ref 1–41)
AMPHETAMINES UR QL SCN: NEGATIVE NG/ML
AST SERPL-CCNC: 14 U/L (ref 1–40)
BARBITURATES UR QL SCN: NEGATIVE NG/ML
BASOPHILS # BLD AUTO: 0.06 10*3/MM3 (ref 0–0.2)
BASOPHILS NFR BLD AUTO: 0.8 % (ref 0–1.5)
BENZODIAZ UR QL SCN: NEGATIVE NG/ML
BILIRUB SERPL-MCNC: 0.7 MG/DL (ref 0.2–1.2)
BUN SERPL-MCNC: 22 MG/DL (ref 8–23)
BUN/CREAT SERPL: 18.3 (ref 7–25)
BZE UR QL SCN: NEGATIVE NG/ML
CALCIUM SERPL-MCNC: 9.3 MG/DL (ref 8.6–10.5)
CANNABINOIDS UR QL SCN: NEGATIVE NG/ML
CHLORIDE SERPL-SCNC: 101 MMOL/L (ref 98–107)
CHOLEST SERPL-MCNC: 155 MG/DL (ref 0–200)
CO2 SERPL-SCNC: 23.6 MMOL/L (ref 22–29)
CREAT SERPL-MCNC: 1.2 MG/DL (ref 0.76–1.27)
CREAT UR-MCNC: 236.4 MG/DL
CREAT UR-MCNC: 237.2 MG/DL (ref 20–300)
EOSINOPHIL # BLD AUTO: 0.19 10*3/MM3 (ref 0–0.4)
EOSINOPHIL NFR BLD AUTO: 2.6 % (ref 0.3–6.2)
ERYTHROCYTE [DISTWIDTH] IN BLOOD BY AUTOMATED COUNT: 12.5 % (ref 12.3–15.4)
GLOBULIN SER CALC-MCNC: 2.9 GM/DL
GLUCOSE SERPL-MCNC: 141 MG/DL (ref 65–99)
HBA1C MFR BLD: 6.5 % (ref 4.8–5.6)
HCT VFR BLD AUTO: 46.9 % (ref 37.5–51)
HDLC SERPL-MCNC: 46 MG/DL (ref 40–60)
HGB BLD-MCNC: 16.3 G/DL (ref 13–17.7)
IMM GRANULOCYTES # BLD AUTO: 0.03 10*3/MM3 (ref 0–0.05)
IMM GRANULOCYTES NFR BLD AUTO: 0.4 % (ref 0–0.5)
LABORATORY COMMENT REPORT: NORMAL
LDLC SERPL CALC-MCNC: 69 MG/DL (ref 0–100)
LYMPHOCYTES # BLD AUTO: 2.15 10*3/MM3 (ref 0.7–3.1)
LYMPHOCYTES NFR BLD AUTO: 29.7 % (ref 19.6–45.3)
MCH RBC QN AUTO: 32.3 PG (ref 26.6–33)
MCHC RBC AUTO-ENTMCNC: 34.8 G/DL (ref 31.5–35.7)
MCV RBC AUTO: 93.1 FL (ref 79–97)
METHADONE UR QL SCN: NEGATIVE NG/ML
MICROALBUMIN UR-MCNC: 7.5 UG/ML
MONOCYTES # BLD AUTO: 0.57 10*3/MM3 (ref 0.1–0.9)
MONOCYTES NFR BLD AUTO: 7.9 % (ref 5–12)
NEUTROPHILS # BLD AUTO: 4.24 10*3/MM3 (ref 1.7–7)
NEUTROPHILS NFR BLD AUTO: 58.6 % (ref 42.7–76)
NRBC BLD AUTO-RTO: 0 /100 WBC (ref 0–0.2)
OPIATES UR QL SCN: NEGATIVE NG/ML
OXYCODONE+OXYMORPHONE UR QL SCN: NEGATIVE NG/ML
PCP UR QL: NEGATIVE NG/ML
PH UR: 5.3 [PH] (ref 4.5–8.9)
PLATELET # BLD AUTO: 292 10*3/MM3 (ref 140–450)
POTASSIUM SERPL-SCNC: 4.6 MMOL/L (ref 3.5–5.2)
PROPOXYPH UR QL SCN: NEGATIVE NG/ML
PROT SERPL-MCNC: 7.4 G/DL (ref 6–8.5)
RBC # BLD AUTO: 5.04 10*6/MM3 (ref 4.14–5.8)
SODIUM SERPL-SCNC: 136 MMOL/L (ref 136–145)
T4 FREE SERPL-MCNC: 0.96 NG/DL (ref 0.93–1.7)
TRIGL SERPL-MCNC: 201 MG/DL (ref 0–150)
TSH SERPL DL<=0.005 MIU/L-ACNC: 6.71 UIU/ML (ref 0.27–4.2)
VLDLC SERPL CALC-MCNC: 40.2 MG/DL
WBC # BLD AUTO: 7.24 10*3/MM3 (ref 3.4–10.8)

## 2020-06-10 ENCOUNTER — OFFICE VISIT (OUTPATIENT)
Dept: INTERNAL MEDICINE | Facility: CLINIC | Age: 85
End: 2020-06-10

## 2020-06-10 VITALS
DIASTOLIC BLOOD PRESSURE: 74 MMHG | HEART RATE: 72 BPM | WEIGHT: 203.2 LBS | BODY MASS INDEX: 30.8 KG/M2 | TEMPERATURE: 97.1 F | HEIGHT: 68 IN | SYSTOLIC BLOOD PRESSURE: 152 MMHG

## 2020-06-10 DIAGNOSIS — E03.9 ACQUIRED HYPOTHYROIDISM: ICD-10-CM

## 2020-06-10 DIAGNOSIS — I49.5 SSS (SICK SINUS SYNDROME) (HCC): ICD-10-CM

## 2020-06-10 DIAGNOSIS — E78.2 MIXED HYPERLIPIDEMIA: ICD-10-CM

## 2020-06-10 DIAGNOSIS — E11.9 TYPE 2 DIABETES MELLITUS WITHOUT COMPLICATION, WITHOUT LONG-TERM CURRENT USE OF INSULIN (HCC): ICD-10-CM

## 2020-06-10 DIAGNOSIS — E66.09 CLASS 1 OBESITY DUE TO EXCESS CALORIES WITH SERIOUS COMORBIDITY AND BODY MASS INDEX (BMI) OF 30.0 TO 30.9 IN ADULT: ICD-10-CM

## 2020-06-10 DIAGNOSIS — I20.9 ANGINA PECTORIS (HCC): ICD-10-CM

## 2020-06-10 DIAGNOSIS — I10 ESSENTIAL HYPERTENSION: Primary | ICD-10-CM

## 2020-06-10 PROCEDURE — 99214 OFFICE O/P EST MOD 30 MIN: CPT | Performed by: INTERNAL MEDICINE

## 2020-06-10 NOTE — PROGRESS NOTES
[facility]     Agapito Downey  1935   [id number]      Patient Care Team:  Kendrick Oconnor MD as PCP - General    Chief Complaint::   Chief Complaint   Patient presents with   • Hypertension   • Hyperlipidemia   • Hypothyroidism   • Prediabetes        HPI  Mr. Downey is now 85.  He comes in for follow-up of his hypertension, hyperlipidemia, coronary artery disease, sick sinus syndrome, hypothyroidism, prediabetes and obesity.  Overall he feels well.  His blood pressures have been a bit high.  He typically gets 1 50-1 80 systolic.  He notes that when he comes to the doctor his readings are always lower.  At one point he brought his monitor to his appointment with Dr. Campbell and compared his monitor to the areas and it proved to be accurate.  He admits he does not feel much different since the pacemaker was placed.  There is no fever, cough, shortness of breath or chest pain.  He continues to struggle with weight.    Chronic Conditions:      Patient Active Problem List   Diagnosis   • Coronary artery disease involving coronary bypass graft of native heart without angina pectoris   • AV block, 1st degree   • Essential hypertension   • Hyperlipemia   • Dyslipidemia   • Osteoarthritis   • HTN (hypertension), benign   • Other fatigue   • Allergic rhinitis   • ASHD (arteriosclerotic heart disease)   • Angina pectoris (CMS/HCC)   • History of malignant neoplasm of prostate   • Snoring   • Abnormal glucose level   • Osteoporosis   • Obesity   • Senile osteoporosis   • Irritable bowel syndrome   • Mobitz type 1 second degree AV block   • Prediabetes   • SSS (sick sinus syndrome) (CMS/HCC)   • Acquired hypothyroidism        Past Medical History:   Diagnosis Date   • AV bloc first degree    • CAD (coronary artery disease)    • Cancer (CMS/HCC)     prostate cancer seed implantion; Brachytherapy 2006, PSA has since been undetectable   • Diverticulosis    • Dyslipidemia    • Hyperlipidemia    •  Hypertension     benign   • Nephrolithiasis 09/20/2014   • Osteoarthritis        Past Surgical History:   Procedure Laterality Date   • CARDIAC ELECTROPHYSIOLOGY PROCEDURE N/A 12/4/2019    Procedure: Device Implant     PPM IMPLANT;  Surgeon: Federico Campbell MD;  Location: [facility] INVASIVE LOCATION;  Service: Cardiology   • CHOLECYSTECTOMY  1995    laparoscopic   • CORONARY ARTERY BYPASS GRAFT  11/1994   • INSERTION PROSTATE RADIATION SEED  2006    Cancer, prostate; PSA has since been undetectable   • OTHER SURGICAL HISTORY      Prostate surgery with seed implantation.   • TONSILLECTOMY  1943       Family History   Problem Relation Age of Onset   • Heart disease Other    • Coronary artery disease Brother         cause of death       Social History     Socioeconomic History   • Marital status:      Spouse name: Not on file   • Number of children: Not on file   • Years of education: Not on file   • Highest education level: Not on file   Tobacco Use   • Smoking status: Never Smoker   • Smokeless tobacco: Never Used   Substance and Sexual Activity   • Alcohol use: No   • Drug use: No   • Sexual activity: Defer       Allergies   Allergen Reactions   • Reserpine Other (See Comments)     depression   • Statins Other (See Comments)     Myalgias and mood affect disorder  fatigue           Current Outpatient Medications:   •  ALPRAZolam (XANAX) 0.5 MG tablet, Take 1 tablet by mouth As Needed for Anxiety., Disp: 10 tablet, Rfl: 0  •  aspirin 81 MG EC tablet, Take 81 mg by mouth daily., Disp: , Rfl:   •  D3-50 1.25 MG (53823 UT) capsule, TAKE 1 CAPSULE BY MOUTH ONCE A WEEK., Disp: 12 capsule, Rfl: 4  •  Evolocumab (Repatha SureClick) 140 MG/ML solution auto-injector, Inject 1 mL under the skin into the appropriate area as directed Every 14 (Fourteen) Days., Disp: , Rfl:   •  ketotifen (Zaditor) 0.025 % ophthalmic solution, Apply  to eye(s) as directed by provider., Disp: , Rfl:   •  losartan (COZAAR) 50 MG  "tablet, Take 1 tablet by mouth Daily., Disp: , Rfl:   •  magnesium oxide (MAG-OX) 400 MG tablet, Take 400 mg by mouth Daily., Disp: , Rfl:   •  metroNIDAZOLE (FLAGYL) 500 MG tablet, Take 1 tablet by mouth Every 12 (Twelve) Hours. Only uses for diverticulitis, Disp: , Rfl:   •  Multiple Vitamins-Minerals (CENTRUM SILVER 50+MEN PO), Take 1 tablet by mouth Daily., Disp: , Rfl:   •  polyethyl glycol-propyl glycol (SYSTANE) 0.4-0.3 % solution ophthalmic solution, Apply  to eye(s) as directed by provider., Disp: , Rfl:   •  Probiotic Product (ALIGN) 4 MG capsule, Take  by mouth., Disp: , Rfl:   •  spironolactone (ALDACTONE) 25 MG tablet, TAKE ONE TABLET BY MOUTH EVERY OTHER DAY, Disp: 45 tablet, Rfl: 0    Review of Systems   Constitutional: Negative for chills, fatigue and fever.   HENT: Negative for congestion, ear pain and sinus pressure.    Respiratory: Negative for cough, chest tightness, shortness of breath and wheezing.    Cardiovascular: Negative for chest pain and palpitations.   Gastrointestinal: Negative for abdominal pain, blood in stool and constipation.   Skin: Negative for color change.   Allergic/Immunologic: Negative for environmental allergies.   Neurological: Negative for dizziness, speech difficulty and headache.   Psychiatric/Behavioral: Negative for decreased concentration. The patient is not nervous/anxious.         Vital Signs  Vitals:    06/10/20 1015   BP: 152/74   BP Location: Right arm   Patient Position: Sitting   Cuff Size: Adult   Pulse: 72   Temp: 97.1 °F (36.2 °C)   Weight: 92.2 kg (203 lb 3.2 oz)   Height: 172.7 cm (67.99\")   PainSc: 0-No pain       Physical Exam   Constitutional: He is oriented to person, place, and time. He appears well-developed and well-nourished. He is obese.  HENT:   Head: Normocephalic and atraumatic.   Right Ear: External ear normal.   Left Ear: External ear normal.   Nose: Nose normal.   Mouth/Throat: Oropharynx is clear and moist. No oropharyngeal exudate. "   Eyes: Pupils are equal, round, and reactive to light. Conjunctivae and EOM are normal.   Neck: Normal range of motion. Neck supple. No JVD present. No thyromegaly present.   Cardiovascular: Normal rate, regular rhythm, normal heart sounds and intact distal pulses. Exam reveals no gallop and no friction rub.   No murmur heard.  Pulmonary/Chest: Effort normal and breath sounds normal. No respiratory distress. He has no wheezes. He has no rales. He exhibits no tenderness.   Abdominal: Soft. Bowel sounds are normal. He exhibits no distension and no mass. There is no tenderness. There is no rebound and no guarding. No hernia.   Musculoskeletal: Normal range of motion. He exhibits no tenderness.   Lymphadenopathy:     He has no cervical adenopathy.   Neurological: He is alert and oriented to person, place, and time. He displays normal reflexes. No cranial nerve deficit or sensory deficit. He exhibits normal muscle tone. Coordination normal.   Skin: Skin is warm and dry. No rash noted. No erythema.   Psychiatric: He has a normal mood and affect. His behavior is normal. Judgment and thought content normal.   Nursing note and vitals reviewed.     Procedures    ACE III MINI             Assessment/Plan:    Agapito was seen today for hypertension, hyperlipidemia, hypothyroidism and prediabetes.    Diagnoses and all orders for this visit:    Essential hypertension    Angina pectoris (CMS/[facility])    SSS (sick sinus syndrome) (CMS/[facility])    Mixed hyperlipidemia    Acquired hypothyroidism    Class 1 obesity due to excess calories with serious comorbidity and body mass index (BMI) of 30.0 to 30.9 in adult    Type 2 diabetes mellitus without complication, without long-term current use of insulin (CMS/[facility])    Plan    Blood pressures are consistently high at home but normal here.  For now he will continue spironolactone, losartan and attempts to lose weight.  I have asked him to repeat his blood pressure reading if the initial reading is  high after sitting for a minute or 2 taking deep breaths.  If he continues to see high readings would consider resuming amlodipine.    Coronary artery disease is asymptomatic.  Continue losartan aspirin and Repatha.  He will continue follow-up with Dr. Campbell.    Sick sinus syndrome and second-degree AV block remain asymptomatic following pacemaker placement.    Lipids remain very well controlled on Repatha.  His triglycerides are slightly elevated.    TSH is 6, down from 9.  I believe this is subclinical and does not require treatment.    BMI is 30.9.  He will continue working on caloric restriction.    A1c was 6.5, now at the threshold of full-blown diabetes.  I think we can continue to safely monitor this with diet although if he continues to creep up would consider metformin.      Plan of care reviewed with patient at the conclusion of today's visit. Education was provided regarding diagnosis, management, and any prescribed or recommended OTC medications.Patient verbalizes understanding of and agreement with management plan.         Kendrick Oconnor MD

## 2020-06-24 ENCOUNTER — TELEPHONE (OUTPATIENT)
Dept: INTERNAL MEDICINE | Facility: CLINIC | Age: 85
End: 2020-06-24

## 2020-06-24 NOTE — TELEPHONE ENCOUNTER
Suggest we increase losartan to 100 mg a day.  If he is taking his current dose in the morning, suggest take another 50 mg at night.  Email readings in a few days.

## 2020-06-24 NOTE — TELEPHONE ENCOUNTER
Regarding: Visit Follow-Up Question  Contact: [phone]      ----- Message -----  From: Montse Wells MA  Sent: 6/24/2020   7:56 AM EDT  To: Kendrick Oconnor MD  Subject: Visit Follow-Up Question                         ----- Message from Montse Wells MA sent at 6/24/2020  7:56 AM EDT -----       ----- Message from Agapito Downey to Kendrick Oconnor MD sent at 6/24/2020  6:22 AM -----   I recorded the highest B/P I have ever measured on myself this morning at 6:00AM.  It was 202/95 with a pulse of 74. Should I see you or Dr Campbell? today?

## 2020-06-24 NOTE — TELEPHONE ENCOUNTER
Called pt - he said what prompted the B/P check at 6 am was feeling of acid reflux. He checked again at 6:40 am 179/93. He took a baby aspirin and losartan. At 7:45 am  139/74. At 11:15 am  128/70  HR 69 - states he feels good. He did not contact Dr. Campbell's office. He has appt there the end of July

## 2020-06-24 NOTE — TELEPHONE ENCOUNTER
Patient requesting to speak to Montse concerning his high BP (please see patient message from this morning 6/24/20)    Patient's call back number is [phone]

## 2020-07-06 ENCOUNTER — TRANSCRIBE ORDERS (OUTPATIENT)
Dept: ADMINISTRATIVE | Facility: HOSPITAL | Age: 85
End: 2020-07-06

## 2020-07-06 DIAGNOSIS — I71.40 ABDOMINAL AORTIC ANEURYSM WITHOUT RUPTURE (HCC): Primary | ICD-10-CM

## 2020-07-09 ENCOUNTER — HOSPITAL ENCOUNTER (OUTPATIENT)
Dept: ULTRASOUND IMAGING | Facility: HOSPITAL | Age: 85
Discharge: HOME | End: 2020-07-09
Admitting: INTERNAL MEDICINE

## 2020-07-09 DIAGNOSIS — I71.40 ABDOMINAL AORTIC ANEURYSM WITHOUT RUPTURE (HCC): ICD-10-CM

## 2020-07-09 PROCEDURE — 76775 US EXAM ABDO BACK WALL LIM: CPT

## 2020-08-10 RX ORDER — SPIRONOLACTONE 25 MG/1
TABLET ORAL
Qty: 45 TABLET | Refills: 1 | Status: SHIPPED | OUTPATIENT
Start: 2020-08-10 | End: 2021-02-15

## 2020-10-07 ENCOUNTER — OFFICE VISIT (OUTPATIENT)
Dept: INTERNAL MEDICINE | Facility: CLINIC | Age: 85
End: 2020-10-07

## 2020-10-07 VITALS
SYSTOLIC BLOOD PRESSURE: 162 MMHG | HEART RATE: 88 BPM | BODY MASS INDEX: 30.46 KG/M2 | TEMPERATURE: 97.2 F | WEIGHT: 201 LBS | HEIGHT: 68 IN | DIASTOLIC BLOOD PRESSURE: 84 MMHG

## 2020-10-07 DIAGNOSIS — R73.03 PREDIABETES: ICD-10-CM

## 2020-10-07 DIAGNOSIS — E03.9 ACQUIRED HYPOTHYROIDISM: ICD-10-CM

## 2020-10-07 DIAGNOSIS — E78.2 MIXED HYPERLIPIDEMIA: ICD-10-CM

## 2020-10-07 DIAGNOSIS — E11.9 TYPE 2 DIABETES MELLITUS WITHOUT COMPLICATION, WITHOUT LONG-TERM CURRENT USE OF INSULIN (HCC): ICD-10-CM

## 2020-10-07 DIAGNOSIS — M81.0 AGE-RELATED OSTEOPOROSIS WITHOUT CURRENT PATHOLOGICAL FRACTURE: ICD-10-CM

## 2020-10-07 DIAGNOSIS — E11.40 TYPE 2 DIABETES MELLITUS WITH DIABETIC NEUROPATHY, WITHOUT LONG-TERM CURRENT USE OF INSULIN (HCC): Primary | ICD-10-CM

## 2020-10-07 LAB — HBA1C MFR BLD: 6.4 % (ref 4.8–5.6)

## 2020-10-07 PROCEDURE — 99213 OFFICE O/P EST LOW 20 MIN: CPT | Performed by: INTERNAL MEDICINE

## 2020-10-07 PROCEDURE — 83036 HEMOGLOBIN GLYCOSYLATED A1C: CPT | Performed by: INTERNAL MEDICINE

## 2020-10-07 RX ORDER — METFORMIN HYDROCHLORIDE 500 MG/1
500 TABLET, EXTENDED RELEASE ORAL
Qty: 30 TABLET | Refills: 5 | Status: SHIPPED | OUTPATIENT
Start: 2020-10-07 | End: 2021-03-24

## 2020-10-07 NOTE — PROGRESS NOTES
[facility]     Agapito Downey  1935   [id number]      Patient Care Team:  Kendrick Oconnor MD as PCP - General    Chief Complaint::   Chief Complaint   Patient presents with   • Hypertension   • Peripheral Neuropathy        HPI  Mr. Downey comes in concerned about discomfort in his toes.  He experiences intermittent soreness, burning and tingling.  Sometimes it feels like the bottoms of his feet are swollen.  Recently his A1c gary to 6.5 which means that he now has full-blown diabetes.  Since then he and his wife have tried very hard to lose weight.  He is concerned that Repatha may have caused him to be diabetic.    Chronic Conditions:      Patient Active Problem List   Diagnosis   • Coronary artery disease involving coronary bypass graft of native heart without angina pectoris   • AV block, 1st degree   • Essential hypertension   • Hyperlipemia   • Dyslipidemia   • Osteoarthritis   • HTN (hypertension), benign   • Other fatigue   • Allergic rhinitis   • ASHD (arteriosclerotic heart disease)   • Angina pectoris (CMS/HCC)   • History of malignant neoplasm of prostate   • Snoring   • Abnormal glucose level   • Osteoporosis   • Obesity   • Senile osteoporosis   • Irritable bowel syndrome   • Mobitz type 1 second degree AV block   • Prediabetes   • SSS (sick sinus syndrome) (CMS/HCC)   • Acquired hypothyroidism        Past Medical History:   Diagnosis Date   • AV bloc first degree    • CAD (coronary artery disease)    • Cancer (CMS/HCC)     prostate cancer seed implantion; Brachytherapy 2006, PSA has since been undetectable   • Diverticulosis    • Dyslipidemia    • Hyperlipidemia    • Hypertension     benign   • Nephrolithiasis 09/20/2014   • Osteoarthritis        Past Surgical History:   Procedure Laterality Date   • CARDIAC ELECTROPHYSIOLOGY PROCEDURE N/A 12/4/2019    Procedure: Device Implant     PPM IMPLANT;  Surgeon: Federico Campbell MD;  Location: [facility] INVASIVE LOCATION;   Service: Cardiology   • CHOLECYSTECTOMY  1995    laparoscopic   • CORONARY ARTERY BYPASS GRAFT  11/1994   • INSERTION PROSTATE RADIATION SEED  2006    Cancer, prostate; PSA has since been undetectable   • OTHER SURGICAL HISTORY      Prostate surgery with seed implantation.   • TONSILLECTOMY  1943       Family History   Problem Relation Age of Onset   • Heart disease Other    • Coronary artery disease Brother         cause of death       Social History     Socioeconomic History   • Marital status:      Spouse name: Not on file   • Number of children: Not on file   • Years of education: Not on file   • Highest education level: Not on file   Tobacco Use   • Smoking status: Never Smoker   • Smokeless tobacco: Never Used   Substance and Sexual Activity   • Alcohol use: No   • Drug use: No   • Sexual activity: Defer       Allergies   Allergen Reactions   • Reserpine Other (See Comments)     depression   • Statins Other (See Comments)     Myalgias and mood affect disorder  fatigue           Current Outpatient Medications:   •  ALPRAZolam (XANAX) 0.5 MG tablet, Take 1 tablet by mouth As Needed for Anxiety., Disp: 10 tablet, Rfl: 0  •  aspirin 81 MG EC tablet, Take 81 mg by mouth daily., Disp: , Rfl:   •  D3-50 1.25 MG (10165 UT) capsule, TAKE 1 CAPSULE BY MOUTH ONCE A WEEK., Disp: 12 capsule, Rfl: 4  •  Evolocumab (Repatha SureClick) 140 MG/ML solution auto-injector, Inject 1 mL under the skin into the appropriate area as directed Every 14 (Fourteen) Days., Disp: , Rfl:   •  ketotifen (Zaditor) 0.025 % ophthalmic solution, Apply 1 drop to eye(s) as directed by provider Daily As Needed., Disp: , Rfl:   •  losartan (COZAAR) 50 MG tablet, Take 1 tablet by mouth Daily. (Patient taking differently: Take 50 mg by mouth 2 (Two) Times a Day.), Disp: , Rfl:   •  magnesium oxide (MAG-OX) 400 MG tablet, Take 400 mg by mouth Daily., Disp: , Rfl:   •  Multiple Vitamins-Minerals (CENTRUM SILVER 50+MEN PO), Take 1 tablet by mouth  "Daily., Disp: , Rfl:   •  polyethyl glycol-propyl glycol (SYSTANE) 0.4-0.3 % solution ophthalmic solution, Apply 1 drop to eye(s) as directed by provider Daily As Needed., Disp: , Rfl:   •  Probiotic Product (ALIGN) 4 MG capsule, Take 1 capsule by mouth Daily., Disp: , Rfl:   •  spironolactone (ALDACTONE) 25 MG tablet, TAKE ONE TABLET BY MOUTH EVERY OTHER DAY , Disp: 45 tablet, Rfl: 1  •  metFORMIN ER (GLUCOPHAGE-XR) 500 MG 24 hr tablet, Take 1 tablet by mouth Daily With Breakfast., Disp: 30 tablet, Rfl: 5    Review of Systems   Constitutional: Negative for chills, fatigue and fever.   HENT: Negative for congestion, ear pain and sinus pressure.    Respiratory: Negative for cough, chest tightness, shortness of breath and wheezing.    Cardiovascular: Negative for chest pain and palpitations.   Gastrointestinal: Negative for abdominal pain, blood in stool and constipation.   Skin: Negative for color change.   Allergic/Immunologic: Negative for environmental allergies.   Neurological: Negative for dizziness, speech difficulty and headache.   Psychiatric/Behavioral: Negative for decreased concentration. The patient is not nervous/anxious.         Vital Signs  Vitals:    10/07/20 0842   BP: 162/84   BP Location: Left arm   Patient Position: Sitting   Cuff Size: Adult   Pulse: 88   Temp: 97.2 °F (36.2 °C)   TempSrc: Temporal   Weight: 91.2 kg (201 lb)   Height: 172.7 cm (67.99\")   PainSc: 0-No pain       Physical Exam  Vitals signs reviewed.   Constitutional:       Appearance: He is well-developed.   HENT:      Head: Normocephalic and atraumatic.   Cardiovascular:      Rate and Rhythm: Normal rate and regular rhythm.      Pulses:           Dorsalis pedis pulses are 2+ on the right side and 2+ on the left side.      Heart sounds: Normal heart sounds. No murmur.   Pulmonary:      Effort: Pulmonary effort is normal.      Breath sounds: Normal breath sounds.   Musculoskeletal:      Right foot: No deformity.      Left foot: No " deformity.   Feet:      Right foot:      Protective Sensation: 5 sites tested. 3 sites sensed.      Skin integrity: Skin integrity normal.      Left foot:      Protective Sensation: 5 sites tested. 3 sites sensed.      Skin integrity: Skin integrity normal.      Comments: There is reduced sensation to monofilament in both feet.    Diabetic Foot Exam Performed and Monofilament Test Performed    Neurological:      Mental Status: He is alert and oriented to person, place, and time.          Procedures    ACE III MINI             Assessment/Plan:    Agapito was seen today for hypertension and peripheral neuropathy.    Diagnoses and all orders for this visit:    Type 2 diabetes mellitus without complication, without long-term current use of insulin (CMS/[facility])  -     POC Glycosylated Hemoglobin (Hb A1C)  -     Ambulatory Referral to Nutrition Services    Type 2 diabetes mellitus with diabetic neuropathy, without long-term current use of insulin (CMS/[facility])    Other orders  -     metFORMIN ER (GLUCOPHAGE-XR) 500 MG 24 hr tablet; Take 1 tablet by mouth Daily With Breakfast.    Plan    His discomfort is due to early diabetic neuropathy.  His A1c today is slightly improved at 6.4.  Ever since he already has complications of recommended we go ahead and start low-dose metformin  mg once a day.  He may discussed the use of Repatha with his cardiologist but I told him that Repatha would only cause diabetes in someone who already had a strong predisposition.  Still important to control his cholesterol.      Plan of care reviewed with patient at the conclusion of today's visit. Education was provided regarding diagnosis, management, and any prescribed or recommended OTC medications.Patient verbalizes understanding of and agreement with management plan.         Kendrick Oconnor MD           Answers for HPI/ROS submitted by the patient on 10/5/2020   What is the primary reason for your visit?: Other  Please describe your  symptoms.: foot pain  Have you had these symptoms before?: Yes  How long have you been having these symptoms?: Greater than 2 weeks  Please describe any probable cause for these symptoms. : Repatha?? diabetes??

## 2020-10-26 ENCOUNTER — HOSPITAL ENCOUNTER (OUTPATIENT)
Dept: DIABETES SERVICES | Facility: HOSPITAL | Age: 85
Setting detail: RECURRING SERIES
Discharge: HOME | End: 2020-10-26

## 2020-10-26 NOTE — CONSULTS
Outpatient Nutrition Services    Patient Name:  Agapito Downey  YOB: 1935  MRN: [id number]      DIABETES NUTRITION EDUCATION CONSULT. This medical referred consult was provided as a telephone call, tele-health or e-visit, as patient is unable to attend an in-office appointment due to the COVID-19 crisis. Consent for treatment was given verbally.  Patient attended their comprehensive nutrition only diabetes class.  Please see media tab for assessment and notes if you use [employer]. If you are not an [employer] user a copy of patient's assessment and notes will be sent per routine. Thank you for this referral.      Electronically signed by:  Latrice Woodall RD  10/26/20 12:10 EDT

## 2020-11-23 ENCOUNTER — HOSPITAL ENCOUNTER (OUTPATIENT)
Dept: NUTRITION | Facility: HOSPITAL | Age: 85
Setting detail: RECURRING SERIES
Discharge: HOME | End: 2020-11-23

## 2020-11-23 NOTE — PROGRESS NOTES
Outpatient Nutrition Services    Patient Name:  Agapito Downey  YOB: 1935  MRN: [id number]    DIABETES NUTRITION EDUCATION CONSULT. This medical referred consult follow-up was provided as a telephone call, as patient is unable to attend an in-office appointment due to the COVID-19 crisis. Consent for treatment was given verbally.Patient attended their comprehensive nutrition only diabetes class follow-up today. Please see media tab for assessment and notes if you use [facility]. If you are not an [employer] user a copy of patient's assessment and notes will be sent per routine. Thank you for this referral.        Electronically signed by:  Latrice Woodall RD  11/23/20 11:15 EST

## 2020-11-24 ENCOUNTER — TELEPHONE (OUTPATIENT)
Dept: INTERNAL MEDICINE | Facility: CLINIC | Age: 85
End: 2020-11-24

## 2020-11-24 DIAGNOSIS — M81.0 AGE-RELATED OSTEOPOROSIS WITHOUT CURRENT PATHOLOGICAL FRACTURE: ICD-10-CM

## 2020-11-24 DIAGNOSIS — E78.2 MIXED HYPERLIPIDEMIA: ICD-10-CM

## 2020-11-24 DIAGNOSIS — I10 ESSENTIAL HYPERTENSION: Primary | ICD-10-CM

## 2020-11-24 DIAGNOSIS — E03.9 ACQUIRED HYPOTHYROIDISM: ICD-10-CM

## 2020-11-24 DIAGNOSIS — R73.03 PREDIABETES: ICD-10-CM

## 2020-11-27 ENCOUNTER — LAB REQUISITION (OUTPATIENT)
Dept: LAB | Facility: HOSPITAL | Age: 85
End: 2020-11-27

## 2020-11-27 DIAGNOSIS — Z00.00 ROUTINE GENERAL MEDICAL EXAMINATION AT A HEALTH CARE FACILITY: ICD-10-CM

## 2020-11-27 PROCEDURE — 36415 COLL VENOUS BLD VENIPUNCTURE: CPT | Performed by: INTERNAL MEDICINE

## 2020-11-29 LAB
25(OH)D3+25(OH)D2 SERPL-MCNC: 62.4 NG/ML (ref 30–100)
ALBUMIN SERPL-MCNC: 4.2 G/DL (ref 3.6–4.6)
ALBUMIN/GLOB SERPL: 1.4 {RATIO} (ref 1.2–2.2)
ALP SERPL-CCNC: 51 IU/L (ref 39–117)
ALT SERPL-CCNC: 16 IU/L (ref 0–44)
AST SERPL-CCNC: 16 IU/L (ref 0–40)
BILIRUB SERPL-MCNC: 0.8 MG/DL (ref 0–1.2)
BUN SERPL-MCNC: 18 MG/DL (ref 8–27)
BUN/CREAT SERPL: 15 (ref 10–24)
CALCIUM SERPL-MCNC: 9.4 MG/DL (ref 8.6–10.2)
CHLORIDE SERPL-SCNC: 102 MMOL/L (ref 96–106)
CHOLEST SERPL-MCNC: 162 MG/DL (ref 100–199)
CO2 SERPL-SCNC: 22 MMOL/L (ref 20–29)
CREAT SERPL-MCNC: 1.21 MG/DL (ref 0.76–1.27)
GLOBULIN SER CALC-MCNC: 3 G/DL (ref 1.5–4.5)
GLUCOSE SERPL-MCNC: 139 MG/DL (ref 65–99)
HBA1C MFR BLD: 6.3 % (ref 4.8–5.6)
HDLC SERPL-MCNC: 46 MG/DL
LDLC SERPL CALC-MCNC: 81 MG/DL (ref 0–99)
POTASSIUM SERPL-SCNC: 4.5 MMOL/L (ref 3.5–5.2)
PROT SERPL-MCNC: 7.2 G/DL (ref 6–8.5)
SODIUM SERPL-SCNC: 140 MMOL/L (ref 134–144)
TRIGL SERPL-MCNC: 211 MG/DL (ref 0–149)
TSH SERPL DL<=0.005 MIU/L-ACNC: 8.61 UIU/ML (ref 0.45–4.5)
VLDLC SERPL CALC-MCNC: 35 MG/DL (ref 5–40)

## 2020-12-03 ENCOUNTER — OFFICE VISIT (OUTPATIENT)
Dept: INTERNAL MEDICINE | Facility: CLINIC | Age: 85
End: 2020-12-03

## 2020-12-03 VITALS
HEART RATE: 88 BPM | DIASTOLIC BLOOD PRESSURE: 72 MMHG | TEMPERATURE: 96.9 F | SYSTOLIC BLOOD PRESSURE: 152 MMHG | BODY MASS INDEX: 30.43 KG/M2 | HEIGHT: 68 IN | WEIGHT: 200.8 LBS

## 2020-12-03 DIAGNOSIS — E78.2 MIXED HYPERLIPIDEMIA: ICD-10-CM

## 2020-12-03 DIAGNOSIS — E03.9 ACQUIRED HYPOTHYROIDISM: ICD-10-CM

## 2020-12-03 DIAGNOSIS — I10 ESSENTIAL HYPERTENSION: Primary | ICD-10-CM

## 2020-12-03 DIAGNOSIS — E11.9 CONTROLLED TYPE 2 DIABETES MELLITUS WITHOUT COMPLICATION, WITHOUT LONG-TERM CURRENT USE OF INSULIN (HCC): ICD-10-CM

## 2020-12-03 DIAGNOSIS — I25.10 ASHD (ARTERIOSCLEROTIC HEART DISEASE): ICD-10-CM

## 2020-12-03 PROCEDURE — 99214 OFFICE O/P EST MOD 30 MIN: CPT | Performed by: INTERNAL MEDICINE

## 2020-12-03 RX ORDER — LEVOTHYROXINE SODIUM 0.05 MG/1
50 TABLET ORAL DAILY
Qty: 90 TABLET | Refills: 1 | Status: SHIPPED | OUTPATIENT
Start: 2020-12-03 | End: 2021-06-30

## 2020-12-03 RX ORDER — CHLORAL HYDRATE 500 MG
1000 CAPSULE ORAL
COMMUNITY

## 2020-12-03 RX ORDER — AMLODIPINE BESYLATE 2.5 MG/1
2.5 TABLET ORAL DAILY
Qty: 30 TABLET | Refills: 2 | Status: SHIPPED | OUTPATIENT
Start: 2020-12-03 | End: 2021-03-02

## 2020-12-03 NOTE — PROGRESS NOTES
[facility]     Agapito Downey  1935   [id number]      Patient Care Team:  Kendrick Oconnor MD as PCP - General    Chief Complaint::   Chief Complaint   Patient presents with   • Hypertension   • Hyperlipidemia   • Diabetes        HPI  Mr. Downey comes in for follow-up of his hypertension, diabetes, hyperlipidemia, coronary artery disease and hypothyroidism.  Overall he feels well.  He is actually lost some weight.  Few weeks ago he was concerned about neuropathy because he had tingling in his feet.  That has resolved.  There is no fever, cough, shortness of breath or chest pain.    Chronic Conditions:      Patient Active Problem List   Diagnosis   • Coronary artery disease involving coronary bypass graft of native heart without angina pectoris   • AV block, 1st degree   • Essential hypertension   • Hyperlipemia   • Dyslipidemia   • Osteoarthritis   • HTN (hypertension), benign   • Other fatigue   • Allergic rhinitis   • ASHD (arteriosclerotic heart disease)   • Angina pectoris (CMS/HCC)   • History of malignant neoplasm of prostate   • Snoring   • Abnormal glucose level   • Osteoporosis   • Obesity   • Senile osteoporosis   • Irritable bowel syndrome   • Mobitz type 1 second degree AV block   • Prediabetes   • SSS (sick sinus syndrome) (CMS/HCC)   • Acquired hypothyroidism   • Controlled type 2 diabetes mellitus without complication, without long-term current use of insulin (CMS/HCC)        Past Medical History:   Diagnosis Date   • AV bloc first degree    • CAD (coronary artery disease)    • Cancer (CMS/HCC)     prostate cancer seed implantion; Brachytherapy 2006, PSA has since been undetectable   • Diverticulosis    • Dyslipidemia    • Hyperlipidemia    • Hypertension     benign   • Nephrolithiasis 09/20/2014   • Osteoarthritis        Past Surgical History:   Procedure Laterality Date   • CARDIAC ELECTROPHYSIOLOGY PROCEDURE N/A 12/4/2019    Procedure: Device Implant     PPM IMPLANT;   Surgeon: Federico Campbell MD;  Location: [facility] INVASIVE LOCATION;  Service: Cardiology   • CHOLECYSTECTOMY  1995    laparoscopic   • CORONARY ARTERY BYPASS GRAFT  11/1994   • INSERTION PROSTATE RADIATION SEED  2006    Cancer, prostate; PSA has since been undetectable   • OTHER SURGICAL HISTORY      Prostate surgery with seed implantation.   • TONSILLECTOMY  1943       Family History   Problem Relation Age of Onset   • Heart disease Other    • Coronary artery disease Brother         cause of death       Social History     Socioeconomic History   • Marital status:      Spouse name: Not on file   • Number of children: Not on file   • Years of education: Not on file   • Highest education level: Not on file   Tobacco Use   • Smoking status: Never Smoker   • Smokeless tobacco: Never Used   Substance and Sexual Activity   • Alcohol use: No   • Drug use: No   • Sexual activity: Defer       Allergies   Allergen Reactions   • Reserpine Other (See Comments)     depression   • Statins Other (See Comments)     Myalgias and mood affect disorder  fatigue           Current Outpatient Medications:   •  ALPRAZolam (XANAX) 0.5 MG tablet, Take 1 tablet by mouth As Needed for Anxiety., Disp: 10 tablet, Rfl: 0  •  aspirin 81 MG EC tablet, Take 81 mg by mouth daily., Disp: , Rfl:   •  D3-50 1.25 MG (52237 UT) capsule, TAKE 1 CAPSULE BY MOUTH ONCE A WEEK., Disp: 12 capsule, Rfl: 4  •  Evolocumab (Repatha SureClick) 140 MG/ML solution auto-injector, Inject 1 mL under the skin into the appropriate area as directed Every 14 (Fourteen) Days., Disp: , Rfl:   •  ketotifen (Zaditor) 0.025 % ophthalmic solution, Apply 1 drop to eye(s) as directed by provider Daily As Needed., Disp: , Rfl:   •  losartan (COZAAR) 50 MG tablet, Take 1 tablet by mouth Daily. (Patient taking differently: Take 50 mg by mouth 2 (Two) Times a Day.), Disp: , Rfl:   •  magnesium oxide (MAG-OX) 400 MG tablet, Take 400 mg by mouth Daily., Disp: , Rfl:   •   "metFORMIN ER (GLUCOPHAGE-XR) 500 MG 24 hr tablet, Take 1 tablet by mouth Daily With Breakfast., Disp: 30 tablet, Rfl: 5  •  Multiple Vitamins-Minerals (CENTRUM SILVER 50+MEN PO), Take 1 tablet by mouth Daily., Disp: , Rfl:   •  Omega-3 Fatty Acids (fish oil) 1000 MG capsule capsule, Take 1,000 mg by mouth Daily With Breakfast., Disp: , Rfl:   •  polyethyl glycol-propyl glycol (SYSTANE) 0.4-0.3 % solution ophthalmic solution, Apply 1 drop to eye(s) as directed by provider Daily As Needed., Disp: , Rfl:   •  Probiotic Product (ALIGN) 4 MG capsule, Take 1 capsule by mouth Daily., Disp: , Rfl:   •  spironolactone (ALDACTONE) 25 MG tablet, TAKE ONE TABLET BY MOUTH EVERY OTHER DAY , Disp: 45 tablet, Rfl: 1  •  amLODIPine (NORVASC) 2.5 MG tablet, Take 1 tablet by mouth Daily., Disp: 30 tablet, Rfl: 2  •  levothyroxine (Synthroid) 50 MCG tablet, Take 1 tablet by mouth Daily., Disp: 90 tablet, Rfl: 1    Review of Systems   Constitutional: Negative for chills, fatigue and fever.   HENT: Negative for congestion, ear pain and sinus pressure.    Respiratory: Positive for cough. Negative for chest tightness, shortness of breath and wheezing.    Cardiovascular: Negative for chest pain and palpitations.   Gastrointestinal: Positive for constipation. Negative for abdominal pain and blood in stool.   Skin: Negative for color change.   Allergic/Immunologic: Positive for environmental allergies.   Neurological: Negative for dizziness, speech difficulty and headache.   Psychiatric/Behavioral: Negative for decreased concentration. The patient is not nervous/anxious.         Vital Signs  Vitals:    12/03/20 1021   BP: 152/72   BP Location: Right arm   Patient Position: Sitting   Cuff Size: Adult   Pulse: 88   Temp: 96.9 °F (36.1 °C)   Weight: 91.1 kg (200 lb 12.8 oz)   Height: 172.7 cm (67.99\")   PainSc: 0-No pain       Physical Exam  Vitals signs reviewed.   Constitutional:       Appearance: He is well-developed.   HENT:      Head: " Normocephalic and atraumatic.   Cardiovascular:      Rate and Rhythm: Normal rate and regular rhythm.      Heart sounds: Normal heart sounds. No murmur.   Pulmonary:      Effort: Pulmonary effort is normal.      Breath sounds: Normal breath sounds.   Neurological:      Mental Status: He is alert and oriented to person, place, and time.          Procedures    ACE III MINI             Assessment/Plan:    Diagnoses and all orders for this visit:    1. Essential hypertension (Primary)    2. Controlled type 2 diabetes mellitus without complication, without long-term current use of insulin (CMS/[facility])    3. Mixed hyperlipidemia    4. ASHD (arteriosclerotic heart disease)    5. Acquired hypothyroidism    Other orders  -     amLODIPine (NORVASC) 2.5 MG tablet; Take 1 tablet by mouth Daily.  Dispense: 30 tablet; Refill: 2  -     levothyroxine (Synthroid) 50 MCG tablet; Take 1 tablet by mouth Daily.  Dispense: 90 tablet; Refill: 1    Plan    Blood pressure is not adequately controlled, begin 2.5 mg of amlodipine daily.  He will continue losartan 50 twice a day.    A1c continues to improve is 6.3.  He will continue working on healthy diet and attempts at weight loss as well as Metformin.    Lipids are reasonably well controlled although triglycerides remain elevated at 211.  He will continue Repatha per Dr. Campbell.    Coronary artery disease is asymptomatic.  He will continue losartan, Repatha and aspirin.    TSH remains slightly elevated but it is up from 6.7-8.6.  He is given a trial of levothyroxine 50 mcg daily.        Plan of care reviewed with patient at the conclusion of today's visit. Education was provided regarding diagnosis, management, and any prescribed or recommended OTC medications.Patient verbalizes understanding of and agreement with management plan.         Kendrick Oconnor MD

## 2021-02-15 RX ORDER — SPIRONOLACTONE 25 MG/1
TABLET ORAL
Qty: 45 TABLET | Refills: 3 | Status: SHIPPED | OUTPATIENT
Start: 2021-02-15 | End: 2022-02-09

## 2021-02-24 ENCOUNTER — PATIENT MESSAGE (OUTPATIENT)
Dept: INTERNAL MEDICINE | Facility: CLINIC | Age: 86
End: 2021-02-24

## 2021-02-24 NOTE — TELEPHONE ENCOUNTER
From: Agapito Downey  To: Kendrick Oconnor MD  Sent: 2/24/2021 1:35 PM EST  Subject: Non-Urgent Medical Question    Information for my chart in your office:  On January 27. 2021 I received my first COVID vaccine shot. Pfizer  On February 24, 2021 I received my second COVID vaccine shot. Pfizer

## 2021-03-02 RX ORDER — AMLODIPINE BESYLATE 2.5 MG/1
TABLET ORAL
Qty: 30 TABLET | Refills: 5 | Status: SHIPPED | OUTPATIENT
Start: 2021-03-02 | End: 2021-08-25

## 2021-03-24 RX ORDER — METFORMIN HYDROCHLORIDE 500 MG/1
TABLET, EXTENDED RELEASE ORAL
Qty: 30 TABLET | Refills: 5 | Status: SHIPPED | OUTPATIENT
Start: 2021-03-24 | End: 2021-09-29

## 2021-04-14 RX ORDER — METHOCARBAMOL 750 MG/1
TABLET ORAL
Qty: 12 CAPSULE | Refills: 1 | Status: SHIPPED | OUTPATIENT
Start: 2021-04-14 | End: 2021-10-20

## 2021-06-02 ENCOUNTER — TELEPHONE (OUTPATIENT)
Dept: INTERNAL MEDICINE | Facility: CLINIC | Age: 86
End: 2021-06-02

## 2021-06-02 DIAGNOSIS — I10 ESSENTIAL HYPERTENSION: Primary | ICD-10-CM

## 2021-06-02 DIAGNOSIS — R73.03 PREDIABETES: ICD-10-CM

## 2021-06-02 DIAGNOSIS — E78.2 MIXED HYPERLIPIDEMIA: ICD-10-CM

## 2021-06-02 DIAGNOSIS — E11.9 CONTROLLED TYPE 2 DIABETES MELLITUS WITHOUT COMPLICATION, WITHOUT LONG-TERM CURRENT USE OF INSULIN: ICD-10-CM

## 2021-06-02 DIAGNOSIS — E03.9 ACQUIRED HYPOTHYROIDISM: ICD-10-CM

## 2021-06-02 DIAGNOSIS — M81.0 AGE-RELATED OSTEOPOROSIS WITHOUT CURRENT PATHOLOGICAL FRACTURE: ICD-10-CM

## 2021-06-02 NOTE — TELEPHONE ENCOUNTER
PT CALLED TO HAVE SOME LAB ORDERS DONE SOMETIME THIS WEEK FOR HIS APPT ON 6/9/2021.    PLEASE ADVISE.  CALL BACK:[phone]

## 2021-06-04 ENCOUNTER — LAB (OUTPATIENT)
Dept: LAB | Facility: HOSPITAL | Age: 86
End: 2021-06-04

## 2021-06-04 DIAGNOSIS — I10 ESSENTIAL HYPERTENSION: ICD-10-CM

## 2021-06-04 DIAGNOSIS — E78.2 MIXED HYPERLIPIDEMIA: ICD-10-CM

## 2021-06-04 DIAGNOSIS — E11.9 CONTROLLED TYPE 2 DIABETES MELLITUS WITHOUT COMPLICATION, WITHOUT LONG-TERM CURRENT USE OF INSULIN (HCC): ICD-10-CM

## 2021-06-04 DIAGNOSIS — E03.9 ACQUIRED HYPOTHYROIDISM: ICD-10-CM

## 2021-06-04 DIAGNOSIS — M81.0 AGE-RELATED OSTEOPOROSIS WITHOUT CURRENT PATHOLOGICAL FRACTURE: ICD-10-CM

## 2021-06-05 LAB
25(OH)D3+25(OH)D2 SERPL-MCNC: 79.4 NG/ML (ref 30–100)
ALBUMIN SERPL-MCNC: 4.5 G/DL (ref 3.5–5.2)
ALBUMIN/CREAT UR: 4 MG/G CREAT (ref 0–29)
ALBUMIN/GLOB SERPL: 1.8 G/DL
ALP SERPL-CCNC: 48 U/L (ref 39–117)
ALT SERPL-CCNC: 16 U/L (ref 1–41)
AST SERPL-CCNC: 15 U/L (ref 1–40)
BASOPHILS # BLD AUTO: 0.07 10*3/MM3 (ref 0–0.2)
BASOPHILS NFR BLD AUTO: 1 % (ref 0–1.5)
BILIRUB SERPL-MCNC: 0.6 MG/DL (ref 0–1.2)
BUN SERPL-MCNC: 15 MG/DL (ref 8–23)
BUN/CREAT SERPL: 14.6 (ref 7–25)
CALCIUM SERPL-MCNC: 9.5 MG/DL (ref 8.6–10.5)
CHLORIDE SERPL-SCNC: 104 MMOL/L (ref 98–107)
CHOLEST SERPL-MCNC: 162 MG/DL (ref 0–200)
CO2 SERPL-SCNC: 23.7 MMOL/L (ref 22–29)
CREAT SERPL-MCNC: 1.03 MG/DL (ref 0.76–1.27)
CREAT UR-MCNC: 179.7 MG/DL
EOSINOPHIL # BLD AUTO: 0.24 10*3/MM3 (ref 0–0.4)
EOSINOPHIL NFR BLD AUTO: 3.4 % (ref 0.3–6.2)
ERYTHROCYTE [DISTWIDTH] IN BLOOD BY AUTOMATED COUNT: 12.2 % (ref 12.3–15.4)
GLOBULIN SER CALC-MCNC: 2.5 GM/DL
GLUCOSE SERPL-MCNC: 132 MG/DL (ref 65–99)
HBA1C MFR BLD: 6.3 % (ref 4.8–5.6)
HCT VFR BLD AUTO: 47.3 % (ref 37.5–51)
HDLC SERPL-MCNC: 50 MG/DL (ref 40–60)
HGB BLD-MCNC: 16.3 G/DL (ref 13–17.7)
IMM GRANULOCYTES # BLD AUTO: 0.01 10*3/MM3 (ref 0–0.05)
IMM GRANULOCYTES NFR BLD AUTO: 0.1 % (ref 0–0.5)
LDLC SERPL CALC-MCNC: 86 MG/DL (ref 0–100)
LYMPHOCYTES # BLD AUTO: 1.8 10*3/MM3 (ref 0.7–3.1)
LYMPHOCYTES NFR BLD AUTO: 25.5 % (ref 19.6–45.3)
MCH RBC QN AUTO: 32.1 PG (ref 26.6–33)
MCHC RBC AUTO-ENTMCNC: 34.5 G/DL (ref 31.5–35.7)
MCV RBC AUTO: 93.1 FL (ref 79–97)
MICROALBUMIN UR-MCNC: 6.9 UG/ML
MONOCYTES # BLD AUTO: 0.58 10*3/MM3 (ref 0.1–0.9)
MONOCYTES NFR BLD AUTO: 8.2 % (ref 5–12)
NEUTROPHILS # BLD AUTO: 4.36 10*3/MM3 (ref 1.7–7)
NEUTROPHILS NFR BLD AUTO: 61.8 % (ref 42.7–76)
NRBC BLD AUTO-RTO: 0 /100 WBC (ref 0–0.2)
PLATELET # BLD AUTO: 341 10*3/MM3 (ref 140–450)
POTASSIUM SERPL-SCNC: 4.8 MMOL/L (ref 3.5–5.2)
PROT SERPL-MCNC: 7 G/DL (ref 6–8.5)
RBC # BLD AUTO: 5.08 10*6/MM3 (ref 4.14–5.8)
SODIUM SERPL-SCNC: 139 MMOL/L (ref 136–145)
TRIGL SERPL-MCNC: 148 MG/DL (ref 0–150)
TSH SERPL DL<=0.005 MIU/L-ACNC: 5.07 UIU/ML (ref 0.27–4.2)
VLDLC SERPL CALC-MCNC: 26 MG/DL (ref 5–40)
WBC # BLD AUTO: 7.06 10*3/MM3 (ref 3.4–10.8)

## 2021-06-09 ENCOUNTER — OFFICE VISIT (OUTPATIENT)
Dept: INTERNAL MEDICINE | Facility: CLINIC | Age: 86
End: 2021-06-09

## 2021-06-09 VITALS
TEMPERATURE: 97.8 F | DIASTOLIC BLOOD PRESSURE: 88 MMHG | SYSTOLIC BLOOD PRESSURE: 150 MMHG | HEART RATE: 84 BPM | HEIGHT: 68 IN | WEIGHT: 197.8 LBS | BODY MASS INDEX: 29.98 KG/M2

## 2021-06-09 DIAGNOSIS — E78.2 MIXED HYPERLIPIDEMIA: ICD-10-CM

## 2021-06-09 DIAGNOSIS — E11.9 CONTROLLED TYPE 2 DIABETES MELLITUS WITHOUT COMPLICATION, WITHOUT LONG-TERM CURRENT USE OF INSULIN (HCC): ICD-10-CM

## 2021-06-09 DIAGNOSIS — E11.40 TYPE 2 DIABETES MELLITUS WITH DIABETIC NEUROPATHY, WITHOUT LONG-TERM CURRENT USE OF INSULIN (HCC): ICD-10-CM

## 2021-06-09 DIAGNOSIS — E66.09 CLASS 1 OBESITY DUE TO EXCESS CALORIES WITH SERIOUS COMORBIDITY AND BODY MASS INDEX (BMI) OF 30.0 TO 30.9 IN ADULT: ICD-10-CM

## 2021-06-09 DIAGNOSIS — I49.5 SICK SINUS SYNDROME (HCC): ICD-10-CM

## 2021-06-09 DIAGNOSIS — E03.9 ACQUIRED HYPOTHYROIDISM: ICD-10-CM

## 2021-06-09 DIAGNOSIS — I10 ESSENTIAL HYPERTENSION: ICD-10-CM

## 2021-06-09 DIAGNOSIS — I20.9 ANGINA PECTORIS (HCC): ICD-10-CM

## 2021-06-09 DIAGNOSIS — Z00.00 PREVENTATIVE HEALTH CARE: Primary | ICD-10-CM

## 2021-06-09 PROBLEM — R73.09 ABNORMAL GLUCOSE LEVEL: Status: RESOLVED | Noted: 2019-05-01 | Resolved: 2021-06-09

## 2021-06-09 PROBLEM — R73.03 PREDIABETES: Status: RESOLVED | Noted: 2019-05-28 | Resolved: 2021-06-09

## 2021-06-09 PROCEDURE — 1170F FXNL STATUS ASSESSED: CPT | Performed by: INTERNAL MEDICINE

## 2021-06-09 PROCEDURE — 96160 PT-FOCUSED HLTH RISK ASSMT: CPT | Performed by: INTERNAL MEDICINE

## 2021-06-09 PROCEDURE — 1126F AMNT PAIN NOTED NONE PRSNT: CPT | Performed by: INTERNAL MEDICINE

## 2021-06-09 PROCEDURE — 99397 PER PM REEVAL EST PAT 65+ YR: CPT | Performed by: INTERNAL MEDICINE

## 2021-06-09 PROCEDURE — G0439 PPPS, SUBSEQ VISIT: HCPCS | Performed by: INTERNAL MEDICINE

## 2021-06-09 NOTE — PROGRESS NOTES
QUICK REFERENCE INFORMATION:  The ABCs of the Annual Wellness Visit    Subsequent Medicare Wellness Visit    HEALTH RISK ASSESSMENT    1935    Recent Hospitalizations:  No hospitalization(s) within the last year..        Current Medical Providers:  Patient Care Team:  Kendrick Oconnor MD as PCP - General        Smoking Status:  Social History     Tobacco Use   Smoking Status Never Smoker   Smokeless Tobacco Never Used       Alcohol Consumption:  Social History     Substance and Sexual Activity   Alcohol Use No       Depression Screen:   PHQ-2/PHQ-9 Depression Screening 6/9/2021   Little interest or pleasure in doing things 0   Feeling down, depressed, or hopeless 0   Total Score 0       Health Habits and Functional and Cognitive Screening:  Functional & Cognitive Status 6/9/2021   Do you have difficulty preparing food and eating? No   Do you have difficulty bathing yourself, getting dressed or grooming yourself? No   Do you have difficulty using the toilet? No   Do you have difficulty moving around from place to place? No   Do you have trouble with steps or getting out of a bed or a chair? No   Current Diet Well Balanced Diet   Dental Exam Up to date   Eye Exam Up to date   Exercise (times per week) 3 times per week   Current Exercise Activities Include Walking   Do you need help using the phone?  No   Are you deaf or do you have serious difficulty hearing?  No   Do you need help with transportation? No   Do you need help shopping? No   Do you need help preparing meals?  No   Do you need help with housework?  No   Do you need help with laundry? No   Do you need help taking your medications? No   Do you need help managing money? No   Do you ever drive or ride in a car without wearing a seat belt? No   Have you felt unusual stress, anger or loneliness in the last month? No   Who do you live with? Spouse   If you need help, do you have trouble finding someone available to you? No   Have you been bothered in  the last four weeks by sexual problems? No   Do you have difficulty concentrating, remembering or making decisions? No       Fall Risk Screen:  RACIEL Fall Risk Assessment was completed, and patient is at LOW risk for falls.Assessment completed on:6/9/2021    ACE III MINI        Does the patient have evidence of cognitive impairment? No    Aspirin use counseling: Taking ASA appropriately as indicated    Recent Lab Results:  CMP:  Lab Results   Component Value Date     (H) 06/04/2021    BUN 15 06/04/2021    CREATININE 1.03 06/04/2021    EGFRIFNONA 69 06/04/2021    EGFRIFAFRI 83 06/04/2021    BCR 14.6 06/04/2021     06/04/2021    K 4.8 06/04/2021    CO2 23.7 06/04/2021    CALCIUM 9.5 06/04/2021    PROTENTOTREF 7.0 06/04/2021    ALBUMIN 4.50 06/04/2021    LABGLOBREF 2.5 06/04/2021    LABIL2 1.8 06/04/2021    BILITOT 0.6 06/04/2021    ALKPHOS 48 06/04/2021    AST 15 06/04/2021    ALT 16 06/04/2021     HbA1c:  Lab Results   Component Value Date    HGBA1C 6.30 (H) 06/04/2021    HGBA1C 6.3 (H) 11/27/2020     Microalbumin:  Lab Results   Component Value Date    MICROALBUR 6.9 06/04/2021     Lipid Panel  Lab Results   Component Value Date    TRIG 148 06/04/2021    HDL 50 06/04/2021    LDL 86 06/04/2021    AST 15 06/04/2021    ALT 16 06/04/2021       Visual Acuity:  No exam data present    Age-appropriate Screening Schedule:  Refer to the list below for future screening recommendations based on patient's age, sex and/or medical conditions. Orders for these recommended tests are listed in the plan section. The patient has been provided with a written plan.    Health Maintenance   Topic Date Due   • DXA SCAN  06/24/2016   • DIABETIC EYE EXAM  10/22/2020   • INFLUENZA VACCINE  08/01/2021   • HEMOGLOBIN A1C  12/04/2021   • LIPID PANEL  06/04/2022   • URINE MICROALBUMIN  06/04/2022   • TDAP/TD VACCINES (3 - Td or Tdap) 02/16/2028   • ZOSTER VACCINE  Completed        Subjective   History of Present Illness    Agapito SAMANO  Shayan is a 86 y.o. male who presents for a Subsequent Wellness Visit.    CHRONIC CONDITIONS    The following portions of the patient's history were reviewed and updated as appropriate: allergies, current medications, past family history, past medical history, past social history, past surgical history and problem list.    Outpatient Medications Prior to Visit   Medication Sig Dispense Refill   • ALPRAZolam (XANAX) 0.5 MG tablet Take 1 tablet by mouth As Needed for Anxiety. 10 tablet 0   • amLODIPine (NORVASC) 2.5 MG tablet TAKE ONE TABLET BY MOUTH ONE TIME DAILY  30 tablet 5   • aspirin 81 MG EC tablet Take 81 mg by mouth daily.     • D3-50 1.25 MG (86328 UT) capsule TAKE ONE CAPSULE BY MOUTH WEEKLY  12 capsule 1   • Evolocumab (Repatha SureClick) 140 MG/ML solution auto-injector Inject 1 mL under the skin into the appropriate area as directed Every 14 (Fourteen) Days.     • ketotifen (Zaditor) 0.025 % ophthalmic solution Apply 1 drop to eye(s) as directed by provider Daily As Needed.     • levothyroxine (Synthroid) 50 MCG tablet Take 1 tablet by mouth Daily. 90 tablet 1   • losartan (COZAAR) 50 MG tablet Take 1 tablet by mouth Daily. (Patient taking differently: Take 50 mg by mouth 2 (Two) Times a Day.)     • magnesium oxide (MAG-OX) 400 MG tablet Take 400 mg by mouth Daily.     • metFORMIN ER (GLUCOPHAGE-XR) 500 MG 24 hr tablet TAKE ONE TABLET BY MOUTH ONE TIME DAILY WITH BREAKFAST 30 tablet 5   • Multiple Vitamins-Minerals (CENTRUM SILVER 50+MEN PO) Take 1 tablet by mouth Daily.     • Omega-3 Fatty Acids (fish oil) 1000 MG capsule capsule Take 1,000 mg by mouth Daily With Breakfast.     • polyethyl glycol-propyl glycol (SYSTANE) 0.4-0.3 % solution ophthalmic solution Apply 1 drop to eye(s) as directed by provider Daily As Needed.     • Probiotic Product (ALIGN) 4 MG capsule Take 1 capsule by mouth Daily.     • spironolactone (ALDACTONE) 25 MG tablet TAKE ONE TABLET BY MOUTH EVERY OTHER DAY  45 tablet 3     No  facility-administered medications prior to visit.       Patient Active Problem List   Diagnosis   • Coronary artery disease involving coronary bypass graft of native heart without angina pectoris   • AV block, 1st degree   • Essential hypertension   • Hyperlipemia   • Dyslipidemia   • Osteoarthritis   • HTN (hypertension), benign   • Other fatigue   • Allergic rhinitis   • ASHD (arteriosclerotic heart disease)   • Angina pectoris (CMS/[facility])   • History of malignant neoplasm of prostate   • Snoring   • Osteoporosis   • Obesity   • Senile osteoporosis   • Irritable bowel syndrome   • Mobitz type 1 second degree AV block   • SSS (sick sinus syndrome) (CMS/[facility])   • Acquired hypothyroidism   • Controlled type 2 diabetes mellitus without complication, without long-term current use of insulin (CMS/[facility])       Advance Care Planning:  ACP discussion was held with the patient during this visit. Patient does not have an advance directive, information provided.    Identification of Risk Factors:  Risk factors include: Advance Directive Discussion.    Review of Systems   Constitutional: Negative for chills, fatigue and fever.   HENT: Negative for congestion, ear pain and sinus pressure.    Respiratory: Negative for cough, chest tightness, shortness of breath and wheezing.    Cardiovascular: Negative for chest pain and palpitations.   Gastrointestinal: Negative for abdominal pain, blood in stool and constipation.   Skin: Negative for color change.   Allergic/Immunologic: Negative for environmental allergies.   Neurological: Negative for dizziness, speech difficulty and headaches.   Psychiatric/Behavioral: Negative for confusion. The patient is not nervous/anxious.        Compared to one year ago, the patient feels his physical health is the same.  Compared to one year ago, the patient feels his mental health is the same.    Objective     Physical Exam  Vitals and nursing note reviewed.   Constitutional:       Appearance: He is  well-developed.   HENT:      Head: Normocephalic and atraumatic.      Right Ear: External ear normal.      Left Ear: External ear normal.      Nose: Nose normal.      Mouth/Throat:      Pharynx: No oropharyngeal exudate.   Eyes:      Conjunctiva/sclera: Conjunctivae normal.      Pupils: Pupils are equal, round, and reactive to light.   Neck:      Thyroid: No thyromegaly.      Vascular: No JVD.   Cardiovascular:      Rate and Rhythm: Normal rate and regular rhythm.      Pulses:           Dorsalis pedis pulses are 1+ on the right side and 1+ on the left side.      Heart sounds: Normal heart sounds. No murmur heard.   No friction rub. No gallop.    Pulmonary:      Effort: Pulmonary effort is normal. No respiratory distress.      Breath sounds: Normal breath sounds. No wheezing or rales.   Chest:      Chest wall: No tenderness.   Abdominal:      General: Bowel sounds are normal. There is no distension.      Palpations: Abdomen is soft. There is no mass.      Tenderness: There is no abdominal tenderness. There is no guarding or rebound.      Hernia: No hernia is present.   Musculoskeletal:         General: No tenderness. Normal range of motion.      Cervical back: Normal range of motion and neck supple.      Right foot: No deformity.      Left foot: No deformity.   Feet:      Right foot:      Protective Sensation: 5 sites tested. 5 sites sensed.      Skin integrity: Skin integrity normal.      Left foot:      Protective Sensation: 5 sites tested. 5 sites sensed.      Skin integrity: Skin integrity normal.      Comments: Sensation in both feet reduced to monofilament.     Diabetic Foot Exam Performed, monofilament exam performed    Lymphadenopathy:      Cervical: No cervical adenopathy.   Skin:     General: Skin is warm and dry.      Findings: No erythema or rash.   Neurological:      Mental Status: He is alert and oriented to person, place, and time.      Cranial Nerves: No cranial nerve deficit.      Sensory: No sensory  "deficit.      Motor: No abnormal muscle tone.      Coordination: Coordination normal.      Deep Tendon Reflexes: Reflexes normal.   Psychiatric:         Behavior: Behavior normal.         Thought Content: Thought content normal.         Judgment: Judgment normal.          Procedures     Vitals:    06/09/21 1556   BP: 150/88   BP Location: Left arm   Patient Position: Sitting   Cuff Size: Adult   Pulse: 84   Temp: 97.8 °F (36.6 °C)   Weight: 89.7 kg (197 lb 12.8 oz)   Height: 172.7 cm (67.99\")   PainSc: 0-No pain       Patient's Body mass index is 30.08 kg/m². indicating that he is obese (BMI >30). Obesity-related health conditions include the following: hypertension, coronary heart disease, diabetes mellitus, dyslipidemias and osteoarthritis. Obesity is unchanged. BMI is is above average; BMI management plan is completed. We discussed portion control, increasing exercise and joining a fitness center or start home based exercise program..      Assessment/Plan   Problem List Items Addressed This Visit        Cardiac and Vasculature    Essential hypertension    Overview     benign         Relevant Medications    losartan (COZAAR) 50 MG tablet    spironolactone (ALDACTONE) 25 MG tablet    amLODIPine (NORVASC) 2.5 MG tablet    Hyperlipemia    Overview     mixed         Relevant Medications    Evolocumab (Repatha SureClick) 140 MG/ML solution auto-injector    Angina pectoris (CMS/HCC)    Relevant Medications    amLODIPine (NORVASC) 2.5 MG tablet       Endocrine and Metabolic    Obesity    Acquired hypothyroidism    Relevant Medications    levothyroxine (Synthroid) 50 MCG tablet    Controlled type 2 diabetes mellitus without complication, without long-term current use of insulin (CMS/HCC)    Relevant Medications    metFORMIN ER (GLUCOPHAGE-XR) 500 MG 24 hr tablet      Other Visit Diagnoses     Preventative health care    -  Primary    Sick sinus syndrome (CMS/HCC)        Type 2 diabetes mellitus with diabetic neuropathy, " without long-term current use of insulin (CMS/[facility])            Patient Self-Management and Personalized Health Advice  The patient has been provided with information about: diet, exercise and weight management and preventive services including:   · Annual Wellness Visit (AWV).    Outpatient Encounter Medications as of 6/9/2021   Medication Sig Dispense Refill   • ALPRAZolam (XANAX) 0.5 MG tablet Take 1 tablet by mouth As Needed for Anxiety. 10 tablet 0   • amLODIPine (NORVASC) 2.5 MG tablet TAKE ONE TABLET BY MOUTH ONE TIME DAILY  30 tablet 5   • aspirin 81 MG EC tablet Take 81 mg by mouth daily.     • D3-50 1.25 MG (42837 UT) capsule TAKE ONE CAPSULE BY MOUTH WEEKLY  12 capsule 1   • Evolocumab (Repatha SureClick) 140 MG/ML solution auto-injector Inject 1 mL under the skin into the appropriate area as directed Every 14 (Fourteen) Days.     • ketotifen (Zaditor) 0.025 % ophthalmic solution Apply 1 drop to eye(s) as directed by provider Daily As Needed.     • levothyroxine (Synthroid) 50 MCG tablet Take 1 tablet by mouth Daily. 90 tablet 1   • losartan (COZAAR) 50 MG tablet Take 1 tablet by mouth Daily. (Patient taking differently: Take 50 mg by mouth 2 (Two) Times a Day.)     • magnesium oxide (MAG-OX) 400 MG tablet Take 400 mg by mouth Daily.     • metFORMIN ER (GLUCOPHAGE-XR) 500 MG 24 hr tablet TAKE ONE TABLET BY MOUTH ONE TIME DAILY WITH BREAKFAST 30 tablet 5   • Multiple Vitamins-Minerals (CENTRUM SILVER 50+MEN PO) Take 1 tablet by mouth Daily.     • Omega-3 Fatty Acids (fish oil) 1000 MG capsule capsule Take 1,000 mg by mouth Daily With Breakfast.     • polyethyl glycol-propyl glycol (SYSTANE) 0.4-0.3 % solution ophthalmic solution Apply 1 drop to eye(s) as directed by provider Daily As Needed.     • Probiotic Product (ALIGN) 4 MG capsule Take 1 capsule by mouth Daily.     • spironolactone (ALDACTONE) 25 MG tablet TAKE ONE TABLET BY MOUTH EVERY OTHER DAY  45 tablet 3     No facility-administered encounter  medications on file as of 6/9/2021.     Plan    Overall he remains in good health.  I encouraged him to follow-up regular exercise habits and continue healthy diet.    A1c is well controlled at 6.3, continue Metformin and healthy diet.    Coronary artery disease is asymptomatic on aspirin, Repatha, losartan.  He will follow-up with Dr. Campbell as scheduled.    Blood pressure is well controlled on amlodipine and losartan.    Lipids are well controlled on Repatha.    TSH is minimally elevated but improved from last visit.  He remains clinically euthyroid on levothyroxine.      Reviewed use of high risk medication in the elderly: yes  Reviewed for potential of harmful drug interactions in the elderly: yes    Follow Up:  Return in about 6 months (around 12/9/2021) for follow up.     There are no Patient Instructions on file for this visit.    An After Visit Summary and PPPS with all of these plans were given to the patient.

## 2021-06-30 RX ORDER — LEVOTHYROXINE SODIUM 0.05 MG/1
TABLET ORAL
Qty: 90 TABLET | Refills: 1 | Status: SHIPPED | OUTPATIENT
Start: 2021-06-30 | End: 2021-12-27

## 2021-08-25 RX ORDER — AMLODIPINE BESYLATE 2.5 MG/1
TABLET ORAL
Qty: 30 TABLET | Refills: 5 | Status: SHIPPED | OUTPATIENT
Start: 2021-08-25 | End: 2022-02-22

## 2021-08-25 NOTE — TELEPHONE ENCOUNTER
Rx Refill Note  Requested Prescriptions     Pending Prescriptions Disp Refills   • amLODIPine (NORVASC) 2.5 MG tablet [Pharmacy Med Name: amLODIPine Besylate Oral Tablet 2.5 MG] 30 tablet 0     Sig: TAKE 1 TABLET BY MOUTH ONCE DAILY      Last office visit with prescribing clinician: 6/9/2021      Next office visit with prescribing clinician: 12/10/2021            Eliz Bryant LPN  08/25/21, 11:35 EDT

## 2021-09-14 ENCOUNTER — PATIENT MESSAGE (OUTPATIENT)
Dept: INTERNAL MEDICINE | Facility: CLINIC | Age: 86
End: 2021-09-14

## 2021-09-14 NOTE — TELEPHONE ENCOUNTER
"From: Agapito Downey  To: Kendrick Oconnor MD  Sent: 9/14/2021 8:46 AM EDT  Subject: Non-Urgent Medical Question    Monica and I had our \"senior\" flu shots at [facility] on Sept 9, 2021. Please enter info into our medical file.  "

## 2021-09-21 ENCOUNTER — PATIENT MESSAGE (OUTPATIENT)
Dept: INTERNAL MEDICINE | Facility: CLINIC | Age: 86
End: 2021-09-21

## 2021-09-21 NOTE — TELEPHONE ENCOUNTER
From: Agapito Downey  To: Kendrick Oconnor MD  Sent: 9/21/2021 12:02 PM EDT  Subject: Non-Urgent Medical Question    Please recommend an ophthalmologist. Our Dr. Grover appears to be retiring.

## 2021-09-29 RX ORDER — METFORMIN HYDROCHLORIDE 500 MG/1
TABLET, EXTENDED RELEASE ORAL
Qty: 90 TABLET | Refills: 1 | Status: SHIPPED | OUTPATIENT
Start: 2021-09-29 | End: 2022-03-25

## 2021-10-20 RX ORDER — METHOCARBAMOL 750 MG/1
TABLET ORAL
Qty: 12 CAPSULE | Refills: 0 | Status: SHIPPED | OUTPATIENT
Start: 2021-10-20 | End: 2022-01-12

## 2021-11-08 ENCOUNTER — TELEPHONE (OUTPATIENT)
Dept: INTERNAL MEDICINE | Facility: CLINIC | Age: 86
End: 2021-11-08

## 2021-11-09 ENCOUNTER — OFFICE VISIT (OUTPATIENT)
Dept: INTERNAL MEDICINE | Facility: CLINIC | Age: 86
End: 2021-11-09

## 2021-11-09 VITALS
RESPIRATION RATE: 16 BRPM | WEIGHT: 201.6 LBS | TEMPERATURE: 98.1 F | HEIGHT: 68 IN | DIASTOLIC BLOOD PRESSURE: 82 MMHG | SYSTOLIC BLOOD PRESSURE: 138 MMHG | OXYGEN SATURATION: 92 % | HEART RATE: 114 BPM | BODY MASS INDEX: 30.55 KG/M2

## 2021-11-09 DIAGNOSIS — K57.92 ACUTE DIVERTICULITIS: Primary | ICD-10-CM

## 2021-11-09 PROCEDURE — 99213 OFFICE O/P EST LOW 20 MIN: CPT | Performed by: INTERNAL MEDICINE

## 2021-11-09 RX ORDER — AMOXICILLIN AND CLAVULANATE POTASSIUM 875; 125 MG/1; MG/1
1 TABLET, FILM COATED ORAL 2 TIMES DAILY
Qty: 14 TABLET | Refills: 0 | Status: SHIPPED | OUTPATIENT
Start: 2021-11-09 | End: 2021-12-10

## 2021-11-09 RX ORDER — SENNA LEAF EXTRACT 176MG/5ML
SYRUP ORAL NIGHTLY
COMMUNITY
End: 2021-12-10

## 2021-11-09 NOTE — PROGRESS NOTES
"[facility]     Agapito Downey  1935   [id number]      Patient Care Team:  Kendrick Oconnor MD as PCP - General    Chief Complaint::   Chief Complaint   Patient presents with   • Abdominal Pain     LLQ x1wk    • Hyperlipidemia     f/u   • Hypertension   • Hypothyroidism        HPI  Mr. Downey comes in complaining of abdominal pain.  He contacted me yesterday concerned about \"a low level of persistent pain in the left lower abdomen.\"  He states that it was most noticeable when arising from a chair or getting out of bed.  There is no pain when standing there was no fever or chills.  There was no rectal bleeding or change in bowel habits.  He does have known diverticulosis and has had diverticulitis in the past.  This is not as severe as the previous bout of diverticulitis.    Chronic Conditions:      Patient Active Problem List   Diagnosis   • Coronary artery disease involving coronary bypass graft of native heart without angina pectoris   • AV block, 1st degree   • Essential hypertension   • Hyperlipemia   • Dyslipidemia   • Osteoarthritis   • HTN (hypertension), benign   • Other fatigue   • Allergic rhinitis   • ASHD (arteriosclerotic heart disease)   • Angina pectoris ([facility])   • History of malignant neoplasm of prostate   • Snoring   • Osteoporosis   • Obesity   • Senile osteoporosis   • Irritable bowel syndrome   • Mobitz type 1 second degree AV block   • SSS (sick sinus syndrome) ([facility])   • Acquired hypothyroidism   • Controlled type 2 diabetes mellitus without complication, without long-term current use of insulin ([facility])        Past Medical History:   Diagnosis Date   • AV bloc first degree    • CAD (coronary artery disease)    • Cancer (HCC)     prostate cancer seed implantion; Brachytherapy 2006, PSA has since been undetectable   • Diverticulosis    • Dyslipidemia    • Hyperlipidemia    • Hypertension     benign   • Nephrolithiasis 09/20/2014   • Osteoarthritis        Past Surgical " History:   Procedure Laterality Date   • CARDIAC ELECTROPHYSIOLOGY PROCEDURE N/A 12/4/2019    Procedure: Device Implant     PPM IMPLANT;  Surgeon: Federico Campbell MD;  Location: [facility] INVASIVE LOCATION;  Service: Cardiology   • CHOLECYSTECTOMY  1995    laparoscopic   • CORONARY ARTERY BYPASS GRAFT  11/1994   • INSERTION PROSTATE RADIATION SEED  2006    Cancer, prostate; PSA has since been undetectable   • OTHER SURGICAL HISTORY      Prostate surgery with seed implantation.   • TONSILLECTOMY  1943       Family History   Problem Relation Age of Onset   • Heart disease Other    • Coronary artery disease Brother         cause of death       Social History     Socioeconomic History   • Marital status:    Tobacco Use   • Smoking status: Never Smoker   • Smokeless tobacco: Never Used   Substance and Sexual Activity   • Alcohol use: No   • Drug use: No   • Sexual activity: Defer       Allergies   Allergen Reactions   • Reserpine Other (See Comments)     depression   • Statins Other (See Comments)     Myalgias and mood affect disorder  fatigue           Current Outpatient Medications:   •  ALPRAZolam (XANAX) 0.5 MG tablet, Take 1 tablet by mouth As Needed for Anxiety., Disp: 10 tablet, Rfl: 0  •  amLODIPine (NORVASC) 2.5 MG tablet, TAKE 1 TABLET BY MOUTH ONCE DAILY, Disp: 30 tablet, Rfl: 5  •  aspirin 81 MG EC tablet, Take 81 mg by mouth daily., Disp: , Rfl:   •  D3-50 1.25 MG (19642 UT) capsule, TAKE ONE CAPSULE BY MOUTH WEEKLY, Disp: 12 capsule, Rfl: 0  •  Evolocumab (Repatha SureClick) 140 MG/ML solution auto-injector, Inject 1 mL under the skin into the appropriate area as directed Every 14 (Fourteen) Days., Disp: , Rfl:   •  ketotifen (Zaditor) 0.025 % ophthalmic solution, Apply 1 drop to eye(s) as directed by provider Daily As Needed., Disp: , Rfl:   •  levothyroxine (SYNTHROID, LEVOTHROID) 50 MCG tablet, TAKE ONE TABLET BY MOUTH ONE TIME DAILY , Disp: 90 tablet, Rfl: 1  •  losartan (COZAAR) 50 MG  "tablet, Take 1 tablet by mouth Daily. (Patient taking differently: Take 50 mg by mouth 2 (Two) Times a Day.), Disp: , Rfl:   •  magnesium oxide (MAG-OX) 400 MG tablet, Take 400 mg by mouth Daily., Disp: , Rfl:   •  metFORMIN ER (GLUCOPHAGE-XR) 500 MG 24 hr tablet, TAKE ONE TABLET BY MOUTH ONE TIME DAILY WITH BREAKFAST, Disp: 90 tablet, Rfl: 1  •  Multiple Vitamins-Minerals (CENTRUM SILVER 50+MEN PO), Take 1 tablet by mouth Daily., Disp: , Rfl:   •  Omega-3 Fatty Acids (fish oil) 1000 MG capsule capsule, Take 1,000 mg by mouth Daily With Breakfast., Disp: , Rfl:   •  polyethyl glycol-propyl glycol (SYSTANE) 0.4-0.3 % solution ophthalmic solution, Apply 1 drop to eye(s) as directed by provider Daily As Needed., Disp: , Rfl:   •  Probiotic Product (ALIGN) 4 MG capsule, Take 1 capsule by mouth Daily., Disp: , Rfl:   •  senna 176 MG/5ML syrup syrup, Take  by mouth Every Night. PRN, Disp: , Rfl:   •  spironolactone (ALDACTONE) 25 MG tablet, TAKE ONE TABLET BY MOUTH EVERY OTHER DAY , Disp: 45 tablet, Rfl: 3  •  amoxicillin-clavulanate (Augmentin) 875-125 MG per tablet, Take 1 tablet by mouth 2 (Two) Times a Day., Disp: 14 tablet, Rfl: 0    Review of Systems   Constitutional: Negative.    Respiratory: Negative.  Negative for chest tightness and shortness of breath.    Cardiovascular: Negative.  Negative for chest pain.   Gastrointestinal: Positive for abdominal pain. Negative for blood in stool, constipation and diarrhea.        Vital Signs  Vitals:    11/09/21 1010   BP: 138/82   BP Location: Left arm   Patient Position: Sitting   Cuff Size: Adult   Pulse: 114   Resp: 16   Temp: 98.1 °F (36.7 °C)   TempSrc: Infrared   SpO2: 92%   Weight: 91.4 kg (201 lb 9.6 oz)   Height: 172.7 cm (68\")   PainSc: 0-No pain       Physical Exam  Vitals reviewed.   Constitutional:       Appearance: He is well-developed.   HENT:      Head: Normocephalic and atraumatic.   Cardiovascular:      Rate and Rhythm: Normal rate and regular rhythm.      " Heart sounds: Normal heart sounds. No murmur heard.      Pulmonary:      Effort: Pulmonary effort is normal.      Breath sounds: Normal breath sounds.   Abdominal:      General: Bowel sounds are normal.      Palpations: Abdomen is soft.      Tenderness: There is abdominal tenderness in the left lower quadrant. There is no rebound.   Neurological:      Mental Status: He is alert and oriented to person, place, and time.          Procedures    ACE III MINI             Assessment/Plan:    Diagnoses and all orders for this visit:    1. Acute diverticulitis (Primary)    Other orders  -     amoxicillin-clavulanate (Augmentin) 875-125 MG per tablet; Take 1 tablet by mouth 2 (Two) Times a Day.  Dispense: 14 tablet; Refill: 0    Plan    Examination is consistent with acute diverticulitis although early and not severe.  He is given Augmentin and will contact me in 48 hours if not improving.      Plan of care reviewed with patient at the conclusion of today's visit. Education was provided regarding diagnosis, management, and any prescribed or recommended OTC medications.Patient verbalizes understanding of and agreement with management plan.         Kendrick Oconnor MD

## 2021-12-06 DIAGNOSIS — M81.0 SENILE OSTEOPOROSIS: ICD-10-CM

## 2021-12-06 DIAGNOSIS — E78.2 MIXED HYPERLIPIDEMIA: ICD-10-CM

## 2021-12-06 DIAGNOSIS — E11.9 CONTROLLED TYPE 2 DIABETES MELLITUS WITHOUT COMPLICATION, WITHOUT LONG-TERM CURRENT USE OF INSULIN (HCC): Primary | ICD-10-CM

## 2021-12-06 DIAGNOSIS — E03.9 ACQUIRED HYPOTHYROIDISM: ICD-10-CM

## 2021-12-07 ENCOUNTER — LAB (OUTPATIENT)
Dept: LAB | Facility: HOSPITAL | Age: 86
End: 2021-12-07

## 2021-12-07 DIAGNOSIS — E11.9 CONTROLLED TYPE 2 DIABETES MELLITUS WITHOUT COMPLICATION, WITHOUT LONG-TERM CURRENT USE OF INSULIN (HCC): ICD-10-CM

## 2021-12-07 DIAGNOSIS — E78.2 MIXED HYPERLIPIDEMIA: ICD-10-CM

## 2021-12-07 DIAGNOSIS — E03.9 ACQUIRED HYPOTHYROIDISM: ICD-10-CM

## 2021-12-07 DIAGNOSIS — M81.0 SENILE OSTEOPOROSIS: ICD-10-CM

## 2021-12-08 LAB
25(OH)D3+25(OH)D2 SERPL-MCNC: 78.5 NG/ML (ref 30–100)
ALBUMIN SERPL-MCNC: 4.3 G/DL (ref 3.5–5.2)
ALBUMIN/GLOB SERPL: 1.3 G/DL
ALP SERPL-CCNC: 52 U/L (ref 39–117)
ALT SERPL-CCNC: 20 U/L (ref 1–41)
AST SERPL-CCNC: 19 U/L (ref 1–40)
BILIRUB SERPL-MCNC: 0.7 MG/DL (ref 0–1.2)
BUN SERPL-MCNC: 16 MG/DL (ref 8–23)
BUN/CREAT SERPL: 16.5 (ref 7–25)
CALCIUM SERPL-MCNC: 9.3 MG/DL (ref 8.6–10.5)
CHLORIDE SERPL-SCNC: 103 MMOL/L (ref 98–107)
CHOLEST SERPL-MCNC: 188 MG/DL (ref 0–200)
CO2 SERPL-SCNC: 23.6 MMOL/L (ref 22–29)
CREAT SERPL-MCNC: 0.97 MG/DL (ref 0.76–1.27)
GLOBULIN SER CALC-MCNC: 3.2 GM/DL
GLUCOSE SERPL-MCNC: 147 MG/DL (ref 65–99)
HBA1C MFR BLD: 6.4 % (ref 4.8–5.6)
HDLC SERPL-MCNC: 48 MG/DL (ref 40–60)
LDLC SERPL CALC-MCNC: 99 MG/DL (ref 0–100)
POTASSIUM SERPL-SCNC: 4.4 MMOL/L (ref 3.5–5.2)
PROT SERPL-MCNC: 7.5 G/DL (ref 6–8.5)
SODIUM SERPL-SCNC: 138 MMOL/L (ref 136–145)
TRIGL SERPL-MCNC: 240 MG/DL (ref 0–150)
TSH SERPL DL<=0.005 MIU/L-ACNC: 6.72 UIU/ML (ref 0.27–4.2)
VLDLC SERPL CALC-MCNC: 41 MG/DL (ref 5–40)

## 2021-12-10 ENCOUNTER — OFFICE VISIT (OUTPATIENT)
Dept: INTERNAL MEDICINE | Facility: CLINIC | Age: 86
End: 2021-12-10

## 2021-12-10 VITALS
BODY MASS INDEX: 29.92 KG/M2 | SYSTOLIC BLOOD PRESSURE: 132 MMHG | TEMPERATURE: 97.1 F | DIASTOLIC BLOOD PRESSURE: 70 MMHG | HEIGHT: 68 IN | HEART RATE: 80 BPM | WEIGHT: 197.4 LBS

## 2021-12-10 DIAGNOSIS — E11.42 DIABETIC POLYNEUROPATHY ASSOCIATED WITH TYPE 2 DIABETES MELLITUS (HCC): ICD-10-CM

## 2021-12-10 DIAGNOSIS — I10 ESSENTIAL HYPERTENSION: Primary | ICD-10-CM

## 2021-12-10 DIAGNOSIS — I25.810 CORONARY ARTERY DISEASE INVOLVING CORONARY BYPASS GRAFT OF NATIVE HEART WITHOUT ANGINA PECTORIS: ICD-10-CM

## 2021-12-10 DIAGNOSIS — E03.9 ACQUIRED HYPOTHYROIDISM: ICD-10-CM

## 2021-12-10 DIAGNOSIS — L98.9 SCALP LESION: ICD-10-CM

## 2021-12-10 DIAGNOSIS — E78.2 MIXED HYPERLIPIDEMIA: ICD-10-CM

## 2021-12-10 PROCEDURE — 99214 OFFICE O/P EST MOD 30 MIN: CPT | Performed by: INTERNAL MEDICINE

## 2021-12-10 NOTE — PROGRESS NOTES
[facility]     Agapito Downey  1935   [id number]      Patient Care Team:  Kendrick Oconnor MD as PCP - General    Chief Complaint::   Chief Complaint   Patient presents with   • Hyperlipidemia   • Hypertension   • Diabetes      The patient has consented to being recorded using [employer].    HPI  The patient states that he is doing well. He denies any new problems.    Diabetes  The patients A1c level obtained today reads 6.4 and his fasting glucose is 147. 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 Along with foot pain, the patient also indicates experiencing occasional numbness of the fingers. He recalls occasional itching of the hands as well.     Hyperlipidemia  His triglyceride levels have increased.    Hyperthyroidism  The patients thyroid stimulating hormone stays just below the normal range.     Prostate Cancer  He notes a history of prostate cancer over 16 years ago in 2005. He inquires about possibility of recurrence.    Head Sores  The patient is still experiencing painful sores that develop on his scalp and appear as pimples. This sores make it painful for the patient to comb his hair. These bumps develop into sores, heal, and eventfully recur.     Diverticulitis  The patient recalls having diverticulitis about 1 month ago. We prescribed him an antibiotic that he had not taken before. He inquired about a prescription of antibiotics to keep on hand at home, but ultimately decided if he needs them, he will call us.    COVID-19  During todays visit he inquired about getting tested for COVID-19 but denies any exposure to virus or evidence of symptoms.     Exercise Restrictions  The patient recalls being told not to shovel snow due to exertion and cold. He inquires about walking in the cold.     Night Sweats  The patient recalls occasionally experiencing night sweats.     Chronic Conditions:       Patient Active Problem List   Diagnosis   • Coronary artery disease involving coronary bypass graft of native heart without angina pectoris   • AV block, 1st degree   • Essential hypertension   • Hyperlipemia   • Dyslipidemia   • Osteoarthritis   • HTN (hypertension), benign   • Other fatigue   • Allergic rhinitis   • ASHD (arteriosclerotic heart disease)   • Angina pectoris (HCC)   • History of malignant neoplasm of prostate   • Snoring   • Osteoporosis   • Obesity   • Senile osteoporosis   • Irritable bowel syndrome   • Mobitz type 1 second degree AV block   • SSS (sick sinus syndrome) (HCC)   • Acquired hypothyroidism   • Controlled type 2 diabetes mellitus without complication, without long-term current use of insulin (HCC)   • Diabetic polyneuropathy associated with type 2 diabetes mellitus (HCC)        Past Medical History:   Diagnosis Date   • AV bloc first degree    • CAD (coronary artery disease)    • Cancer (HCC)     prostate cancer seed implantion; Brachytherapy 2006, PSA has since been undetectable   • Diverticulosis    • Dyslipidemia    • Hyperlipidemia    • Hypertension     benign   • Nephrolithiasis 09/20/2014   • Osteoarthritis        Past Surgical History:   Procedure Laterality Date   • CARDIAC ELECTROPHYSIOLOGY PROCEDURE N/A 12/4/2019    Procedure: Device Implant     PPM IMPLANT;  Surgeon: Federico Campbell MD;  Location: [facility] INVASIVE [facility];  Service: Cardiology   • CHOLECYSTECTOMY  1995    laparoscopic   • CORONARY ARTERY BYPASS GRAFT  11/1994   • INSERTION PROSTATE RADIATION SEED  2006    Cancer, prostate; PSA has since been undetectable   • OTHER SURGICAL HISTORY      Prostate surgery with seed implantation.   • TONSILLECTOMY  1943       Family History   Problem Relation Age of Onset   • Heart disease Other    • Coronary artery disease Brother         cause of death       Social History     Socioeconomic History   • Marital status:    Tobacco Use   • Smoking status:  Never Smoker   • Smokeless tobacco: Never Used   Substance and Sexual Activity   • Alcohol use: No   • Drug use: No   • Sexual activity: Defer       Allergies   Allergen Reactions   • Reserpine Other (See Comments)     depression   • Statins Other (See Comments)     Myalgias and mood affect disorder  fatigue           Current Outpatient Medications:   •  ALPRAZolam (XANAX) 0.5 MG tablet, Take 1 tablet by mouth As Needed for Anxiety., Disp: 10 tablet, Rfl: 0  •  amLODIPine (NORVASC) 2.5 MG tablet, TAKE 1 TABLET BY MOUTH ONCE DAILY, Disp: 30 tablet, Rfl: 5  •  aspirin 81 MG EC tablet, Take 81 mg by mouth daily., Disp: , Rfl:   •  D3-50 1.25 MG (72943 UT) capsule, TAKE ONE CAPSULE BY MOUTH WEEKLY, Disp: 12 capsule, Rfl: 0  •  Evolocumab (Repatha SureClick) 140 MG/ML solution auto-injector, Inject 1 mL under the skin into the appropriate area as directed Every 14 (Fourteen) Days., Disp: , Rfl:   •  ketotifen (Zaditor) 0.025 % ophthalmic solution, Apply 1 drop to eye(s) as directed by provider Daily As Needed., Disp: , Rfl:   •  levothyroxine (SYNTHROID, LEVOTHROID) 50 MCG tablet, TAKE ONE TABLET BY MOUTH ONE TIME DAILY , Disp: 90 tablet, Rfl: 1  •  losartan (COZAAR) 50 MG tablet, Take 1 tablet by mouth Daily. (Patient taking differently: Take 50 mg by mouth 2 (Two) Times a Day.), Disp: , Rfl:   •  magnesium oxide (MAG-OX) 400 MG tablet, Take 400 mg by mouth Daily., Disp: , Rfl:   •  metFORMIN ER (GLUCOPHAGE-XR) 500 MG 24 hr tablet, TAKE ONE TABLET BY MOUTH ONE TIME DAILY WITH BREAKFAST, Disp: 90 tablet, Rfl: 1  •  Multiple Vitamins-Minerals (CENTRUM SILVER 50+MEN PO), Take 1 tablet by mouth Daily., Disp: , Rfl:   •  Omega-3 Fatty Acids (fish oil) 1000 MG capsule capsule, Take 1,000 mg by mouth Daily With Breakfast., Disp: , Rfl:   •  polyethyl glycol-propyl glycol (SYSTANE) 0.4-0.3 % solution ophthalmic solution, Apply 1 drop to eye(s) as directed by provider Daily As Needed., Disp: , Rfl:   •  Probiotic Product (ALIGN)  "4 MG capsule, Take 1 capsule by mouth Daily., Disp: , Rfl:   •  psyllium (METAMUCIL) 100 % pack packet, Take 1 packet by mouth Daily., Disp: , Rfl:   •  spironolactone (ALDACTONE) 25 MG tablet, TAKE ONE TABLET BY MOUTH EVERY OTHER DAY , Disp: 45 tablet, Rfl: 3    Review of Systems   Constitutional: Negative for chills, fatigue and fever.   HENT: Negative for congestion, ear pain and sinus pressure.    Respiratory: Negative for cough, chest tightness, shortness of breath and wheezing.    Cardiovascular: Negative for chest pain and palpitations.   Gastrointestinal: Negative for abdominal pain, blood in stool and constipation.   Skin: Negative for color change.   Allergic/Immunologic: Negative for environmental allergies.   Neurological: Negative for dizziness, speech difficulty and headache.   Psychiatric/Behavioral: Negative for decreased concentration. The patient is not nervous/anxious.         Vital Signs  Vitals:    12/10/21 1144   BP: 132/70   BP Location: Left arm   Patient Position: Sitting   Cuff Size: Large Adult   Pulse: 80   Temp: 97.1 °F (36.2 °C)   Weight: 89.5 kg (197 lb 6.4 oz)   Height: 172.7 cm (67.99\")   PainSc: 0-No pain       Physical Exam  Vitals reviewed.   Constitutional:       Appearance: He is well-developed.   HENT:      Head: Normocephalic and atraumatic.   Eyes:      Pupils: Pupils are equal, round, and reactive to light.   Cardiovascular:      Rate and Rhythm: Normal rate and regular rhythm.      Heart sounds: Normal heart sounds.   Pulmonary:      Effort: Pulmonary effort is normal.      Breath sounds: Normal breath sounds.   Abdominal:      General: Bowel sounds are normal.      Palpations: Abdomen is soft.   Musculoskeletal:         General: Normal range of motion.      Cervical back: Normal range of motion.   Skin:     General: Skin is warm and dry.   Neurological:      Mental Status: He is alert and oriented to person, place, and time.          Procedures    ACE III MINI          "    Assessment/Plan:    Diagnoses and all orders for this visit:    1. Essential hypertension (Primary)    2. Diabetic polyneuropathy associated with type 2 diabetes mellitus (HCC)    3. Mixed hyperlipidemia    4. Coronary artery disease involving coronary bypass graft of native heart without angina pectoris    5. Acquired hypothyroidism    6. Scalp lesion  -     Ambulatory Referral to Dermatology        1. Diabetes  -A1c is well controlled at 6.4. He will continue working on diet and weight lost. The patient will also continue taking metformin as directed.     2. Hypertension  - Blood sugar is well controlled on spironolactone and amlodipine.     3. Hyperlipidemia:   -Triglycerides are up and LDL is well controlled. He will continue with healthy diet and Repatha.    4. Coronary artery disease  - Asymptomatic on aspirin, Repatha, losartan, and spironolactone.     5. Hypothyroidism  -TSH remains slightly elevated, but he is clinically euthyroid on the current dose, so we will not make a dose judgement at this time.     6. Prostate cancer  - Treated with radiation many years ago. We will continue once yearly PSA for surveillance.    7. Head Sores  -I have provided the patient with a referral to dermatology.     I will have the patient follow up with me in 6 months    Plan of care reviewed with patient at the conclusion of today's visit. Education was provided regarding diagnosis, management, and any prescribed or recommended OTC medications.Patient verbalizes understanding of and agreement with management plan.       Transcribed from ambient dictation for Kendrick Oconnor MD by Sri Manjarrez.  12/10/21   22:43 EST    Patient verbalized consent to the visit recording.      Kendrick Oconnor MD

## 2021-12-27 RX ORDER — LEVOTHYROXINE SODIUM 0.05 MG/1
TABLET ORAL
Qty: 90 TABLET | Refills: 0 | Status: SHIPPED | OUTPATIENT
Start: 2021-12-27 | End: 2022-03-28

## 2021-12-27 NOTE — TELEPHONE ENCOUNTER
Rx Refill Note  Requested Prescriptions     Pending Prescriptions Disp Refills   • levothyroxine (SYNTHROID, LEVOTHROID) 50 MCG tablet [Pharmacy Med Name: Levothyroxine Sodium Oral Tablet 50 MCG] 90 tablet 0     Sig: TAKE ONE TABLET BY MOUTH ONE TIME DAILY      Last office visit with prescribing clinician: 12/10/2021      Next office visit with prescribing clinician: 6/16/2022            Divya Cruz RN  12/27/21, 13:18 EST

## 2022-01-12 RX ORDER — METHOCARBAMOL 750 MG/1
TABLET ORAL
Qty: 12 CAPSULE | Refills: 1 | Status: SHIPPED | OUTPATIENT
Start: 2022-01-12 | End: 2022-07-13

## 2022-02-09 RX ORDER — SPIRONOLACTONE 25 MG/1
TABLET ORAL
Qty: 45 TABLET | Refills: 0 | Status: SHIPPED | OUTPATIENT
Start: 2022-02-09 | End: 2022-02-22

## 2022-02-09 NOTE — TELEPHONE ENCOUNTER
Called and advised patient. He verbalized understanding.   
He does need to have blood work for this appt.  I have placed orders.   
Patient called to see if he needed to have lab work done before his 6 month follow up with Dr. Oconnor on 12/2/19 at 10:30 AM. I did not see active lab orders in the patient's chart, but I told him that I would send a message to Dr. Oconnor and ask if he would like for lab work to be done prior to this visit. The patient would like a call at [phone] when this message has been received with information about whether or not he needs to come in for lab work prior to his 12/2/19 visit with Dr. Oconnor. The patient said that if he is unable to answer the phone, a detailed voicemail may be left as it is a secure line.  
2+ bilateral

## 2022-02-22 RX ORDER — AMLODIPINE BESYLATE 2.5 MG/1
TABLET ORAL
Qty: 30 TABLET | Refills: 0 | Status: SHIPPED | OUTPATIENT
Start: 2022-02-22 | End: 2022-02-24 | Stop reason: SDUPTHER

## 2022-02-22 RX ORDER — SPIRONOLACTONE 25 MG/1
TABLET ORAL
Qty: 45 TABLET | Refills: 0 | Status: SHIPPED | OUTPATIENT
Start: 2022-02-22 | End: 2022-05-25

## 2022-02-24 RX ORDER — AMLODIPINE BESYLATE 2.5 MG/1
2.5 TABLET ORAL DAILY
Qty: 90 TABLET | Refills: 3 | Status: SHIPPED | OUTPATIENT
Start: 2022-02-24 | End: 2022-06-16

## 2022-03-25 RX ORDER — METFORMIN HYDROCHLORIDE 500 MG/1
TABLET, EXTENDED RELEASE ORAL
Qty: 90 TABLET | Refills: 0 | Status: SHIPPED | OUTPATIENT
Start: 2022-03-25 | End: 2022-06-16 | Stop reason: SDUPTHER

## 2022-03-28 RX ORDER — LEVOTHYROXINE SODIUM 0.05 MG/1
TABLET ORAL
Qty: 90 TABLET | Refills: 0 | Status: SHIPPED | OUTPATIENT
Start: 2022-03-28 | End: 2022-06-22

## 2022-05-10 ENCOUNTER — OFFICE VISIT (OUTPATIENT)
Dept: INTERNAL MEDICINE | Facility: CLINIC | Age: 87
End: 2022-05-10

## 2022-05-10 VITALS
BODY MASS INDEX: 30.4 KG/M2 | DIASTOLIC BLOOD PRESSURE: 80 MMHG | TEMPERATURE: 97.3 F | SYSTOLIC BLOOD PRESSURE: 146 MMHG | HEIGHT: 68 IN | HEART RATE: 64 BPM | WEIGHT: 200.6 LBS

## 2022-05-10 DIAGNOSIS — I25.810 CORONARY ARTERY DISEASE INVOLVING CORONARY BYPASS GRAFT OF NATIVE HEART WITHOUT ANGINA PECTORIS: Primary | Chronic | ICD-10-CM

## 2022-05-10 DIAGNOSIS — I10 ESSENTIAL HYPERTENSION: Chronic | ICD-10-CM

## 2022-05-10 PROCEDURE — 99214 OFFICE O/P EST MOD 30 MIN: CPT | Performed by: STUDENT IN AN ORGANIZED HEALTH CARE EDUCATION/TRAINING PROGRAM

## 2022-05-10 RX ORDER — APIXABAN 5 MG/1
1 TABLET, FILM COATED ORAL 2 TIMES DAILY
COMMUNITY
Start: 2022-01-31

## 2022-05-10 NOTE — PROGRESS NOTES
"Chief Complaint  Agapito Downey is a 86 y.o. male presenting for Pain (Both shoulders across the back   off and on for a mo.).     Patient has a past medical history of CAD s/p CABG (1994), AV block first-degree, sick sinus syndrome s/p PM, dyslipidemia, hypothyroidism, T2DM with polyneuropathy, IBS, prostate cancer, osteoarthritis, osteoporosis, snoring, fatigue and obesity.    History of Present Illness  Patient is here due to to 2 episodes of upper back pain/bilateral shoulder pain.  First episode happened about 1 month ago when he was on the treadmill, he increased tension and noticed upper back pain/bilateral shoulder pain.  He had to get off the treadmill and the pain subsided.  He had another episode last week when he was walking low-grade uphill, and symptoms recurred.  He did not notice any radiation to his elbows or hands, no chest pain, no epigastric pain.  No shortness of air.  He did not notice any symptoms when moving his arms, elevating arms above the head, no pain with movement of his head.  He did have CABG in 1994.  His last visit with his cardiologist Dr. Federcio Campbell was about 3 months ago, for check of his pacemaker.  Patient says he was noted to have A. fib about 2% of the time.  Patient did not have any symptoms for last few days.    Patient checks his blood pressure at home occasionally, last checked 3 to 4 days ago at 131/67.    The following portions of the patient's history were reviewed and updated as appropriate: allergies, current medications, past family history, past medical history, past social history, past surgical history and problem list.    Objective  /80 (BP Location: Left arm, Patient Position: Sitting, Cuff Size: Adult)   Pulse 64   Temp 97.3 °F (36.3 °C) (Temporal)   Ht 172.7 cm (67.99\")   Wt 91 kg (200 lb 9.6 oz)   BMI 30.51 kg/m²     Physical Exam  Vitals reviewed.   Constitutional:       Appearance: Normal appearance.   HENT:      Head: Normocephalic and " atraumatic.      Nose: No congestion.   Eyes:      Extraocular Movements: Extraocular movements intact.      Conjunctiva/sclera: Conjunctivae normal.   Cardiovascular:      Rate and Rhythm: Normal rate and regular rhythm.      Heart sounds: Normal heart sounds. No murmur heard.  Pulmonary:      Effort: Pulmonary effort is normal.      Breath sounds: Normal breath sounds.   Abdominal:      General: There is no distension.      Palpations: Abdomen is soft. There is no mass.      Tenderness: There is no abdominal tenderness.   Musculoskeletal:         General: No tenderness. Normal range of motion.      Cervical back: Normal range of motion and neck supple. No rigidity.      Right lower leg: No edema.      Left lower leg: No edema.      Comments: No pain of shoulders, no discomfort when moving arms or elevating over shoulder level.     Skin:     General: Skin is warm and dry.   Neurological:      Mental Status: He is alert and oriented to person, place, and time. Mental status is at baseline.   Psychiatric:         Behavior: Behavior normal.         Thought Content: Thought content normal.         Assessment/Plan   1. Coronary artery disease involving coronary bypass graft of native heart without angina pectoris  I am concerned that his exertional upper back pain is of coronary origin.  It appears stable at this time, but I have recommended he reaches out to his cardiologist so they can follow-up, potentially do stress test.  I also encouraged patient to get back in touch if any worsening symptoms, especially with worsening pain with lower exertional threshold.  Patient in agreement.    2. Essential hypertension  BP Readings from Last 3 Encounters:   05/10/22 146/80   12/10/21 132/70   11/09/21 138/82   Blood pressure has been well controlled, in the office today it is mildly elevated.  Home blood pressure reassuring.  We will continue to monitor on follow-up visits    Total time spent on chart review, charting and  face-to-face with patient 30 minutes    Return if symptoms worsen or fail to improve, for Next scheduled follow up.    Future Appointments       Provider Department Center    6/16/2022 9:15 AM Kendrick Oconnor MD [facility] INTERNAL MEDIC[facility]            Jose Juan Cardoza MD  Family Medicine  05/10/2022

## 2022-05-25 RX ORDER — SPIRONOLACTONE 25 MG/1
TABLET ORAL
Qty: 45 TABLET | Refills: 3 | Status: SHIPPED | OUTPATIENT
Start: 2022-05-25

## 2022-05-25 NOTE — TELEPHONE ENCOUNTER
Rx Refill Note  Requested Prescriptions     Pending Prescriptions Disp Refills   • spironolactone (ALDACTONE) 25 MG tablet [Pharmacy Med Name: Spironolactone Oral Tablet 25 MG] 45 tablet 0     Sig: TAKE ONE TABLET BY MOUTH EVERY OTHER DAY      Last office visit with prescribing clinician: 12/10/2021      Next office visit with prescribing clinician: 6/16/2022            Eliz Bryant LPN  05/25/22, 09:13 EDT

## 2022-06-09 ENCOUNTER — TELEPHONE (OUTPATIENT)
Dept: INTERNAL MEDICINE | Facility: CLINIC | Age: 87
End: 2022-06-09

## 2022-06-09 DIAGNOSIS — E78.2 MIXED HYPERLIPIDEMIA: ICD-10-CM

## 2022-06-09 DIAGNOSIS — M81.0 AGE-RELATED OSTEOPOROSIS WITHOUT CURRENT PATHOLOGICAL FRACTURE: ICD-10-CM

## 2022-06-09 DIAGNOSIS — E11.9 CONTROLLED TYPE 2 DIABETES MELLITUS WITHOUT COMPLICATION, WITHOUT LONG-TERM CURRENT USE OF INSULIN: ICD-10-CM

## 2022-06-09 DIAGNOSIS — E03.9 ACQUIRED HYPOTHYROIDISM: ICD-10-CM

## 2022-06-09 DIAGNOSIS — I10 ESSENTIAL HYPERTENSION: Primary | ICD-10-CM

## 2022-06-09 NOTE — TELEPHONE ENCOUNTER
Caller: Agapito Downey    Relationship: Self    Best call back number: [phone]     What orders are you requesting (i.e. lab or imaging): LAB    In what timeframe would the patient need to come in: TOMORROW MORNING    Where will you receive your lab/imaging services: [facility]

## 2022-06-10 ENCOUNTER — LAB (OUTPATIENT)
Dept: LAB | Facility: HOSPITAL | Age: 87
End: 2022-06-10

## 2022-06-10 DIAGNOSIS — I10 ESSENTIAL HYPERTENSION: ICD-10-CM

## 2022-06-10 DIAGNOSIS — E03.9 ACQUIRED HYPOTHYROIDISM: ICD-10-CM

## 2022-06-10 DIAGNOSIS — M81.0 AGE-RELATED OSTEOPOROSIS WITHOUT CURRENT PATHOLOGICAL FRACTURE: ICD-10-CM

## 2022-06-10 DIAGNOSIS — E11.9 CONTROLLED TYPE 2 DIABETES MELLITUS WITHOUT COMPLICATION, WITHOUT LONG-TERM CURRENT USE OF INSULIN: ICD-10-CM

## 2022-06-10 DIAGNOSIS — E78.2 MIXED HYPERLIPIDEMIA: ICD-10-CM

## 2022-06-11 LAB
25(OH)D3+25(OH)D2 SERPL-MCNC: 77.1 NG/ML (ref 30–100)
ALBUMIN SERPL-MCNC: 4.1 G/DL (ref 3.5–5.2)
ALBUMIN/GLOB SERPL: 1.4 G/DL
ALP SERPL-CCNC: 44 U/L (ref 39–117)
ALT SERPL-CCNC: 14 U/L (ref 1–41)
AST SERPL-CCNC: 15 U/L (ref 1–40)
BASOPHILS # BLD AUTO: 0.04 10*3/MM3 (ref 0–0.2)
BASOPHILS NFR BLD AUTO: 0.6 % (ref 0–1.5)
BILIRUB SERPL-MCNC: 0.8 MG/DL (ref 0–1.2)
BUN SERPL-MCNC: 17 MG/DL (ref 8–23)
BUN/CREAT SERPL: 14.3 (ref 7–25)
CALCIUM SERPL-MCNC: 8.8 MG/DL (ref 8.6–10.5)
CHLORIDE SERPL-SCNC: 105 MMOL/L (ref 98–107)
CHOLEST SERPL-MCNC: 149 MG/DL (ref 0–200)
CO2 SERPL-SCNC: 22.2 MMOL/L (ref 22–29)
CREAT SERPL-MCNC: 1.19 MG/DL (ref 0.76–1.27)
EGFRCR SERPLBLD CKD-EPI 2021: 59.9 ML/MIN/1.73
EOSINOPHIL # BLD AUTO: 0.19 10*3/MM3 (ref 0–0.4)
EOSINOPHIL NFR BLD AUTO: 2.7 % (ref 0.3–6.2)
ERYTHROCYTE [DISTWIDTH] IN BLOOD BY AUTOMATED COUNT: 12.5 % (ref 12.3–15.4)
GLOBULIN SER CALC-MCNC: 3 GM/DL
GLUCOSE SERPL-MCNC: 136 MG/DL (ref 65–99)
HBA1C MFR BLD: 6 % (ref 4.8–5.6)
HCT VFR BLD AUTO: 45.5 % (ref 37.5–51)
HDLC SERPL-MCNC: 46 MG/DL (ref 40–60)
HGB BLD-MCNC: 15.6 G/DL (ref 13–17.7)
IMM GRANULOCYTES # BLD AUTO: 0.02 10*3/MM3 (ref 0–0.05)
IMM GRANULOCYTES NFR BLD AUTO: 0.3 % (ref 0–0.5)
LDLC SERPL CALC-MCNC: 69 MG/DL (ref 0–100)
LYMPHOCYTES # BLD AUTO: 1.87 10*3/MM3 (ref 0.7–3.1)
LYMPHOCYTES NFR BLD AUTO: 26.3 % (ref 19.6–45.3)
MCH RBC QN AUTO: 31.5 PG (ref 26.6–33)
MCHC RBC AUTO-ENTMCNC: 34.3 G/DL (ref 31.5–35.7)
MCV RBC AUTO: 91.7 FL (ref 79–97)
MONOCYTES # BLD AUTO: 0.55 10*3/MM3 (ref 0.1–0.9)
MONOCYTES NFR BLD AUTO: 7.7 % (ref 5–12)
NEUTROPHILS # BLD AUTO: 4.44 10*3/MM3 (ref 1.7–7)
NEUTROPHILS NFR BLD AUTO: 62.4 % (ref 42.7–76)
NRBC BLD AUTO-RTO: 0 /100 WBC (ref 0–0.2)
PLATELET # BLD AUTO: 278 10*3/MM3 (ref 140–450)
POTASSIUM SERPL-SCNC: 4.5 MMOL/L (ref 3.5–5.2)
PROT SERPL-MCNC: 7.1 G/DL (ref 6–8.5)
RBC # BLD AUTO: 4.96 10*6/MM3 (ref 4.14–5.8)
SODIUM SERPL-SCNC: 138 MMOL/L (ref 136–145)
TRIGL SERPL-MCNC: 208 MG/DL (ref 0–150)
TSH SERPL DL<=0.005 MIU/L-ACNC: 6.9 UIU/ML (ref 0.27–4.2)
VLDLC SERPL CALC-MCNC: 34 MG/DL (ref 5–40)
WBC # BLD AUTO: 7.11 10*3/MM3 (ref 3.4–10.8)

## 2022-06-16 ENCOUNTER — OFFICE VISIT (OUTPATIENT)
Dept: INTERNAL MEDICINE | Facility: CLINIC | Age: 87
End: 2022-06-16

## 2022-06-16 VITALS
HEIGHT: 67 IN | TEMPERATURE: 97.7 F | BODY MASS INDEX: 31.83 KG/M2 | WEIGHT: 202.8 LBS | DIASTOLIC BLOOD PRESSURE: 80 MMHG | HEART RATE: 72 BPM | SYSTOLIC BLOOD PRESSURE: 136 MMHG

## 2022-06-16 DIAGNOSIS — M81.0 SENILE OSTEOPOROSIS: ICD-10-CM

## 2022-06-16 DIAGNOSIS — I10 ESSENTIAL HYPERTENSION: Chronic | ICD-10-CM

## 2022-06-16 DIAGNOSIS — E11.42 DIABETIC POLYNEUROPATHY ASSOCIATED WITH TYPE 2 DIABETES MELLITUS: ICD-10-CM

## 2022-06-16 DIAGNOSIS — Z00.00 PREVENTATIVE HEALTH CARE: Primary | ICD-10-CM

## 2022-06-16 DIAGNOSIS — I20.9 ANGINA PECTORIS: ICD-10-CM

## 2022-06-16 DIAGNOSIS — I49.5 SSS (SICK SINUS SYNDROME): ICD-10-CM

## 2022-06-16 DIAGNOSIS — E03.9 ACQUIRED HYPOTHYROIDISM: ICD-10-CM

## 2022-06-16 DIAGNOSIS — E78.5 DYSLIPIDEMIA: ICD-10-CM

## 2022-06-16 PROCEDURE — 1159F MED LIST DOCD IN RCRD: CPT | Performed by: INTERNAL MEDICINE

## 2022-06-16 PROCEDURE — 1170F FXNL STATUS ASSESSED: CPT | Performed by: INTERNAL MEDICINE

## 2022-06-16 PROCEDURE — 96160 PT-FOCUSED HLTH RISK ASSMT: CPT | Performed by: INTERNAL MEDICINE

## 2022-06-16 PROCEDURE — 99397 PER PM REEVAL EST PAT 65+ YR: CPT | Performed by: INTERNAL MEDICINE

## 2022-06-16 PROCEDURE — G0439 PPPS, SUBSEQ VISIT: HCPCS | Performed by: INTERNAL MEDICINE

## 2022-06-16 RX ORDER — METFORMIN HYDROCHLORIDE 500 MG/1
500 TABLET, EXTENDED RELEASE ORAL DAILY
Qty: 90 TABLET | Refills: 3 | Status: SHIPPED | OUTPATIENT
Start: 2022-06-16

## 2022-06-16 RX ORDER — METOPROLOL SUCCINATE 50 MG/1
1 TABLET, EXTENDED RELEASE ORAL DAILY
COMMUNITY
Start: 2022-05-16 | End: 2022-12-19 | Stop reason: ALTCHOICE

## 2022-06-16 NOTE — PROGRESS NOTES
QUICK REFERENCE INFORMATION:  The ABCs of the Annual Wellness Visit    Subsequent Medicare Wellness Visit    HEALTH RISK ASSESSMENT    1935    Recent Hospitalizations:  No hospitalization(s) within the last year..        Current Medical Providers:  Patient Care Team:  Kendrick Oconnor MD as PCP - General  Federico Campbell MD as Consulting Physician (Cardiology)        Smoking Status:  Social History     Tobacco Use   Smoking Status Never Smoker   Smokeless Tobacco Never Used       Alcohol Consumption:  Social History     Substance and Sexual Activity   Alcohol Use No       Depression Screen:   PHQ-2/PHQ-9 Depression Screening 6/16/2022   Retired PHQ-9 Total Score -   Retired Total Score -   Little Interest or Pleasure in Doing Things 0-->not at all   Feeling Down, Depressed or Hopeless 0-->not at all   PHQ-9: Brief Depression Severity Measure Score 0       Health Habits and Functional and Cognitive Screening:  Functional & Cognitive Status 6/16/2022   Do you have difficulty preparing food and eating? No   Do you have difficulty bathing yourself, getting dressed or grooming yourself? No   Do you have difficulty using the toilet? No   Do you have difficulty moving around from place to place? No   Do you have trouble with steps or getting out of a bed or a chair? No   Current Diet Well Balanced Diet        Current Diet Comment ?   Dental Exam Up to date   Eye Exam Up to date   Exercise (times per week) 5 times per week   Current Exercises Include Walking;Treadmill   Current Exercise Activities Include -   Do you need help using the phone?  No   Are you deaf or do you have serious difficulty hearing?  No   Do you need help with transportation? No   Do you need help shopping? No   Do you need help preparing meals?  No   Do you need help with housework?  No   Do you need help with laundry? No   Do you need help taking your medications? No   Do you need help managing money? No   Do you ever drive or ride in  a car without wearing a seat belt? No   Have you felt unusual stress, anger or loneliness in the last month? No   Who do you live with? Spouse   If you need help, do you have trouble finding someone available to you? No   Have you been bothered in the last four weeks by sexual problems? No   Do you have difficulty concentrating, remembering or making decisions? No       Fall Risk Screen:  RACIEL Fall Risk Assessment was completed, and patient is at LOW risk for falls.Assessment completed on:6/16/2022    ACE III MINI        Does the patient have evidence of cognitive impairment? No    Aspirin use counseling: Does not need ASA (and currently is not on it)    Recent Lab Results:  CMP:  Lab Results   Component Value Date    BUN 17 06/10/2022    CREATININE 1.19 06/10/2022    EGFRIFNONA 74 12/07/2021    EGFRIFAFRI 89 12/07/2021    BCR 14.3 06/10/2022     06/10/2022    K 4.5 06/10/2022    CO2 22.2 06/10/2022    CALCIUM 8.8 06/10/2022    PROTENTOTREF 7.1 06/10/2022    ALBUMIN 4.10 06/10/2022    LABGLOBREF 3.0 06/10/2022    LABIL2 1.4 06/10/2022    BILITOT 0.8 06/10/2022    ALKPHOS 44 06/10/2022    AST 15 06/10/2022    ALT 14 06/10/2022     HbA1c:  Lab Results   Component Value Date    HGBA1C 6.00 (H) 06/10/2022    HGBA1C 6.40 (H) 12/07/2021     Microalbumin:  Lab Results   Component Value Date    MICROALBUR 6.9 06/04/2021     Lipid Panel  Lab Results   Component Value Date    TRIG 208 (H) 06/10/2022    HDL 46 06/10/2022    LDL 69 06/10/2022    AST 15 06/10/2022    ALT 14 06/10/2022       Visual Acuity:  No exam data present    Age-appropriate Screening Schedule:  Refer to the list below for future screening recommendations based on patient's age, sex and/or medical conditions. Orders for these recommended tests are listed in the plan section. The patient has been provided with a written plan.    Health Maintenance   Topic Date Due   • DXA SCAN  06/24/2016   • URINE MICROALBUMIN  06/04/2022   • DIABETIC EYE EXAM   "06/15/2022   • INFLUENZA VACCINE  10/01/2022   • HEMOGLOBIN A1C  12/10/2022   • LIPID PANEL  06/10/2023   • TDAP/TD VACCINES (3 - Td or Tdap) 02/16/2028   • ZOSTER VACCINE  Completed        Subjective   History of Present Illness    Agapito Downey is a 87 y.o. male who presents for a Subsequent Wellness Visit and for follow up of diabetes, coronary artery disease/angina, hypertension, dyslipidemia, sick sinus syndrome, hypothyroidism, and osteoporosis.    Shoulder pain  The patient states he is experiencing pain across his shoulders. He describes on 1 occasion when he walked for the first time on treadmill and he started 2 miles an hour and boosted it up to 2.5 he states he got sharp pain across his shoulders. He states when he stepped off the treadmill the pain went away. The patient states on a 2nd occasion he walked uphill and he got to the top he experienced an inflamed sharp pain across his shoulders again.    The patient states he made an appointment with Dr. Cardoza and explained it all to him and he says well he do not know, but it sounds like the heart. He states he then made an appointment with Dr. Campbell who gave him a nuclear test stress test and an ultrasound. He states the report on the ultrasound stated no new obstruction.     The patient states he walked on the treadmill yesterday and there was no pain. He states he wonders \"what the world could it be if it not his heart\". The patient states on occasion he has gone to a chiropractor having snap things around. He states he has not been in a long time, but he thinks maybe that triggered his shoulder pain. He asks the question \"could out of alignment back there cause that\". He mentions that he went to a chiropractor in [place] that his doctor recommended and he was good. The patient has osteopenia in his spine.     The patient states he has a number of treadmill tests and the Fritz protocol when he was younger in [place]. He states  he " "has been very cautious because he thought his shoulder pain may be heart-related, but he is getting back on the treadmill anyway. He mentions it is to hot to walk outdoors.    Health maintenance  The patient states he takes Tylenol for neuropathy. The patient states that he has been avoiding taking over-the-counter medications. He states that he woke up with a lot of anxiety yesterday morning. He reports that this has been happening since he has been taking metoprolol for a few months. He states that he thought maybe reducing the dose or taking it at a different time.    Cough  He states he clears his throat a lot and he tends to cough after awhile during after he eats.    Sleep issues  The patient states a concern that between 8 and 9 a.m. he has breakfast and goes on his computer for a while then about 11:00 am he feels very sleepy.  He is curious if it might be caused by his medications. The patient states his quality of sleep does not seem to be good. He states at night when he goes to bed, he would sleep for a couple of hours and then wake up for awhile and go back to sleep. He describes this has repeatedly occurred in the night.    Hypertension  The patient's blood pressure today is 138/80 mmHg.    Acid reflux  The patient states that in the early morning around 3:00 AM or 5:00 AM, he gets a \"funny feeling\" in his chest. He states that he talked to a physician about it and he was told that nothing to do with his heart. He states that he has been having a bowl of Cheerios with milk before he goes to bed.     CHRONIC CONDITIONS    The following portions of the patient's history were reviewed and updated as appropriate: allergies, current medications, past family history, past medical history, past social history, past surgical history and problem list.    Outpatient Medications Prior to Visit   Medication Sig Dispense Refill   • ALPRAZolam (XANAX) 0.5 MG tablet Take 1 tablet by mouth As Needed for Anxiety. 10 " tablet 0   • D3-50 1.25 MG (64489 UT) capsule TAKE ONE CAPSULE BY MOUTH WEEKLY 12 capsule 1   • Eliquis 5 MG tablet tablet Take 1 tablet by mouth 2 (Two) Times a Day.     • Evolocumab (REPATHA) solution auto-injector SureClick injection Inject 1 mL under the skin into the appropriate area as directed Every 14 (Fourteen) Days.     • ketotifen (ZADITOR) 0.025 % ophthalmic solution Apply 1 drop to eye(s) as directed by provider Daily As Needed.     • levothyroxine (SYNTHROID, LEVOTHROID) 50 MCG tablet TAKE ONE TABLET BY MOUTH ONE TIME DAILY 90 tablet 0   • losartan (COZAAR) 50 MG tablet Take 1 tablet by mouth Daily. (Patient taking differently: Take 50 mg by mouth 2 (Two) Times a Day.)     • magnesium oxide (MAG-OX) 400 MG tablet Take 400 mg by mouth Daily.     • metoprolol succinate XL (TOPROL-XL) 50 MG 24 hr tablet Take 1 tablet by mouth Daily.     • Multiple Vitamins-Minerals (CENTRUM SILVER 50+MEN PO) Take 1 tablet by mouth Daily.     • Omega-3 Fatty Acids (fish oil) 1000 MG capsule capsule Take 1,000 mg by mouth Daily With Breakfast.     • polyethyl glycol-propyl glycol (SYSTANE) 0.4-0.3 % solution ophthalmic solution Apply 1 drop to eye(s) as directed by provider Daily As Needed.     • Probiotic Product (ALIGN) 4 MG capsule Take 1 capsule by mouth Daily.     • psyllium (METAMUCIL) 100 % pack packet Take 1 packet by mouth Daily.     • spironolactone (ALDACTONE) 25 MG tablet TAKE ONE TABLET BY MOUTH EVERY OTHER DAY 45 tablet 3   • metFORMIN ER (GLUCOPHAGE-XR) 500 MG 24 hr tablet TAKE ONE TABLET BY MOUTH ONE TIME DAILY 90 tablet 0   • amLODIPine (NORVASC) 2.5 MG tablet Take 1 tablet by mouth Daily. 90 tablet 3   • aspirin 81 MG EC tablet Take 81 mg by mouth daily.       No facility-administered medications prior to visit.       Patient Active Problem List   Diagnosis   • Coronary artery disease involving coronary bypass graft of native heart without angina pectoris   • AV block, 1st degree   • Essential hypertension    • Dyslipidemia   • Osteoarthritis   • Other fatigue   • Allergic rhinitis   • ASHD (arteriosclerotic heart disease)   • Angina pectoris ([facility])   • History of malignant neoplasm of prostate   • Snoring   • Osteoporosis   • Obesity   • Senile osteoporosis   • Irritable bowel syndrome   • Mobitz type 1 second degree AV block   • SSS (sick sinus syndrome) ([facility])   • Acquired hypothyroidism   • Diabetic polyneuropathy associated with type 2 diabetes mellitus ([facility])       Advance Care Planning:  ACP discussion was held with the patient during this visit. Patient has an advance directive (not in EMR), copy requested.    Identification of Risk Factors:  Risk factors include: Advance Directive Discussion.    Review of Systems   Constitutional: Negative for chills, fatigue and fever.   HENT: Negative for congestion, ear pain and sinus pressure.    Respiratory: Negative for cough, chest tightness, shortness of breath and wheezing.    Cardiovascular: Negative for chest pain and palpitations.   Gastrointestinal: Negative for abdominal pain, blood in stool and constipation.   Skin: Negative for color change.   Allergic/Immunologic: Negative for environmental allergies.   Neurological: Negative for dizziness, speech difficulty and headaches.   Psychiatric/Behavioral: Negative for confusion. The patient is not nervous/anxious.        Compared to one year ago, the patient feels his physical health is the same.  Compared to one year ago, the patient feels his mental health is the same.    Objective     Physical Exam  Vitals and nursing note reviewed.   Constitutional:       Appearance: He is well-developed.   HENT:      Head: Normocephalic and atraumatic.      Right Ear: External ear normal.      Left Ear: External ear normal.      Nose: Nose normal.      Mouth/Throat:      Pharynx: No oropharyngeal exudate.   Eyes:      Conjunctiva/sclera: Conjunctivae normal.      Pupils: Pupils are equal, round, and reactive to light.   Neck:       Thyroid: No thyromegaly.      Vascular: No JVD.   Cardiovascular:      Rate and Rhythm: Normal rate and regular rhythm.      Pulses:           Dorsalis pedis pulses are 1+ on the right side and 1+ on the left side.      Heart sounds: Normal heart sounds. No murmur heard.    No friction rub. No gallop.   Pulmonary:      Effort: Pulmonary effort is normal. No respiratory distress.      Breath sounds: Normal breath sounds. No wheezing or rales.   Chest:      Chest wall: No tenderness.   Abdominal:      General: Bowel sounds are normal. There is no distension.      Palpations: Abdomen is soft. There is no mass.      Tenderness: There is no abdominal tenderness. There is no guarding or rebound.      Hernia: No hernia is present.   Musculoskeletal:         General: No tenderness. Normal range of motion.      Cervical back: Normal range of motion and neck supple.      Right foot: No deformity.      Left foot: No deformity.   Feet:      Right foot:      Protective Sensation: 5 sites tested. 5 sites sensed.      Skin integrity: Skin integrity normal.      Left foot:      Protective Sensation: 5 sites tested. 5 sites sensed.      Skin integrity: Skin integrity normal.      Comments: Sensation in both feet intact to monofilament.     Diabetic Foot Exam Performed and Monofilament Test Performed    Lymphadenopathy:      Cervical: No cervical adenopathy.   Skin:     General: Skin is warm and dry.      Findings: No erythema or rash.   Neurological:      Mental Status: He is alert and oriented to person, place, and time.      Cranial Nerves: No cranial nerve deficit.      Sensory: No sensory deficit.      Motor: No abnormal muscle tone.      Coordination: Coordination normal.      Deep Tendon Reflexes: Reflexes normal.   Psychiatric:         Behavior: Behavior normal.         Thought Content: Thought content normal.         Judgment: Judgment normal.          Procedures     Vitals:    06/16/22 0919   BP: 136/80   BP Location: Left  "arm   Patient Position: Sitting   Cuff Size: Large Adult   Pulse: 72   Temp: 97.7 °F (36.5 °C)   Weight: 92 kg (202 lb 12.8 oz)   Height: 170.2 cm (67\")   PainSc: 0-No pain       BMI is >= 30 and <35. (Class 1 Obesity). The following options were offered after discussion;: exercise counseling/recommendations and nutrition counseling/recommendations      Assessment & Plan        1. Prevention  - Overall, he continues to do reasonably well. We discussed the importance of resuming regular physical activity. If the back discomfort that he experienced before his stress test recurs, would recommend either physical therapy or seeing his chiropractor. He is fully vaccinated against COVID-19.    2. Diabetes  - A1c is well controlled at 6.0. He has mild neuropathy. He will continue metformin and efforts at healthy diet. He is now due for diabetic eye exam, which he plans to schedule soon.    3. Angina pectoris  - Coronary artery disease is asymptomatic on Eliquis, losartan, metoprolol, and Repatha.    4. Hypertension  - Blood pressure is well controlled on losartan and metoprolol.    5. Dyslipidemia  - Lipids are generally well controlled. Triglycerides are elevated, but improved from last visit, so he will continue current measures.    6. Hypothyroidism  - TSH is slightly elevated, but I believe this is subclinical. He will continue current dose of levothyroxine.    7. Osteoporosis  - Last DEXA showed a T-score of -2.1 in the spine and normal in the hip. I do not think at 87, further testing is indicated. He will continue regular walking, vitamin D, and calcium supplementation.    Follow up in 6 months.    Problem List Items Addressed This Visit        Cardiac and Vasculature    Essential hypertension (Chronic)    Overview     benign           Relevant Medications    losartan (COZAAR) 50 MG tablet    spironolactone (ALDACTONE) 25 MG tablet    metoprolol succinate XL (TOPROL-XL) 50 MG 24 hr tablet    Dyslipidemia    Overview "      He was unable to tolerate statins, as he states he gets “zombie-like” and cannot function.              Angina pectoris ([facility])    Relevant Medications    metoprolol succinate XL (TOPROL-XL) 50 MG 24 hr tablet    SSS (sick sinus syndrome) ([facility])    Overview     Added automatically from request for surgery [id number]           Relevant Medications    metoprolol succinate XL (TOPROL-XL) 50 MG 24 hr tablet       Endocrine and Metabolic    Acquired hypothyroidism    Relevant Medications    levothyroxine (SYNTHROID, LEVOTHROID) 50 MCG tablet    metoprolol succinate XL (TOPROL-XL) 50 MG 24 hr tablet       Musculoskeletal and Injuries    Senile osteoporosis       Neuro    Diabetic polyneuropathy associated with type 2 diabetes mellitus ([facility])    Relevant Medications    metFORMIN ER (GLUCOPHAGE-XR) 500 MG 24 hr tablet      Other Visit Diagnoses     Preventative health care    -  Primary        Patient Self-Management and Personalized Health Advice  The patient has been provided with information about: diet, exercise and weight management and preventive services including:   · Annual Wellness Visit (AWV).    Outpatient Encounter Medications as of 6/16/2022   Medication Sig Dispense Refill   • ALPRAZolam (XANAX) 0.5 MG tablet Take 1 tablet by mouth As Needed for Anxiety. 10 tablet 0   • D3-50 1.25 MG (14695 UT) capsule TAKE ONE CAPSULE BY MOUTH WEEKLY 12 capsule 1   • Eliquis 5 MG tablet tablet Take 1 tablet by mouth 2 (Two) Times a Day.     • Evolocumab (REPATHA) solution auto-injector SureClick injection Inject 1 mL under the skin into the appropriate area as directed Every 14 (Fourteen) Days.     • ketotifen (ZADITOR) 0.025 % ophthalmic solution Apply 1 drop to eye(s) as directed by provider Daily As Needed.     • levothyroxine (SYNTHROID, LEVOTHROID) 50 MCG tablet TAKE ONE TABLET BY MOUTH ONE TIME DAILY 90 tablet 0   • losartan (COZAAR) 50 MG tablet Take 1 tablet by mouth Daily. (Patient taking differently: Take 50 mg by  mouth 2 (Two) Times a Day.)     • magnesium oxide (MAG-OX) 400 MG tablet Take 400 mg by mouth Daily.     • metFORMIN ER (GLUCOPHAGE-XR) 500 MG 24 hr tablet Take 1 tablet by mouth Daily. 90 tablet 3   • metoprolol succinate XL (TOPROL-XL) 50 MG 24 hr tablet Take 1 tablet by mouth Daily.     • Multiple Vitamins-Minerals (CENTRUM SILVER 50+MEN PO) Take 1 tablet by mouth Daily.     • Omega-3 Fatty Acids (fish oil) 1000 MG capsule capsule Take 1,000 mg by mouth Daily With Breakfast.     • polyethyl glycol-propyl glycol (SYSTANE) 0.4-0.3 % solution ophthalmic solution Apply 1 drop to eye(s) as directed by provider Daily As Needed.     • Probiotic Product (ALIGN) 4 MG capsule Take 1 capsule by mouth Daily.     • psyllium (METAMUCIL) 100 % pack packet Take 1 packet by mouth Daily.     • spironolactone (ALDACTONE) 25 MG tablet TAKE ONE TABLET BY MOUTH EVERY OTHER DAY 45 tablet 3   • [DISCONTINUED] metFORMIN ER (GLUCOPHAGE-XR) 500 MG 24 hr tablet TAKE ONE TABLET BY MOUTH ONE TIME DAILY 90 tablet 0   • [DISCONTINUED] amLODIPine (NORVASC) 2.5 MG tablet Take 1 tablet by mouth Daily. 90 tablet 3   • [DISCONTINUED] aspirin 81 MG EC tablet Take 81 mg by mouth daily.       No facility-administered encounter medications on file as of 6/16/2022.       Reviewed use of high risk medication in the elderly: yes  Reviewed for potential of harmful drug interactions in the elderly: yes    Follow Up:  Return in about 6 months (around 12/16/2022) for follow up, request medical records [facility].     There are no Patient Instructions on file for this visit.    An After Visit Summary and PPPS with all of these plans were given to the patient.          Transcribed from ambient dictation for Kendrick Oconnor MD by KARUNA PARKS.  06/16/22   11:07 EDT    Patient verbalized consent to the visit recording.

## 2022-06-22 RX ORDER — LEVOTHYROXINE SODIUM 0.05 MG/1
TABLET ORAL
Qty: 90 TABLET | Refills: 3 | Status: SHIPPED | OUTPATIENT
Start: 2022-06-22 | End: 2022-12-19 | Stop reason: SDUPTHER

## 2022-06-22 NOTE — TELEPHONE ENCOUNTER
Rx Refill Note  Requested Prescriptions     Pending Prescriptions Disp Refills   • levothyroxine (SYNTHROID, LEVOTHROID) 50 MCG tablet [Pharmacy Med Name: Levothyroxine Sodium Oral Tablet 50 MCG] 90 tablet 0     Sig: TAKE ONE TABLET BY MOUTH ONE TIME DAILY      Last office visit with prescribing clinician: 5/10/22  Next office visit with prescribing clinician: 12/19/22           Divya Cruz RN  06/22/22, 09:49 EDT

## 2022-07-13 RX ORDER — METHOCARBAMOL 750 MG/1
TABLET ORAL
Qty: 12 CAPSULE | Refills: 0 | Status: SHIPPED | OUTPATIENT
Start: 2022-07-13 | End: 2022-09-29

## 2022-07-13 NOTE — TELEPHONE ENCOUNTER
Rx Refill Note  Requested Prescriptions     Pending Prescriptions Disp Refills   • D3-50 1.25 MG (52631 UT) capsule [Pharmacy Med Name: D3-50 Oral Capsule 1.25 MG (31767 UT)] 12 capsule 0     Sig: TAKE ONE CAPSULE BY MOUTH WEEKLY      Last office visit with prescribing clinician: 6/16/2022      Next office visit with prescribing clinician: 12/19/2022            Divya Cruz RN  07/13/22, 14:24 EDT

## 2022-09-29 RX ORDER — METHOCARBAMOL 750 MG/1
TABLET ORAL
Qty: 12 CAPSULE | Refills: 0 | Status: SHIPPED | OUTPATIENT
Start: 2022-09-29 | End: 2022-12-21

## 2022-12-13 ENCOUNTER — TELEPHONE (OUTPATIENT)
Dept: INTERNAL MEDICINE | Facility: CLINIC | Age: 87
End: 2022-12-13

## 2022-12-13 DIAGNOSIS — M81.0 AGE-RELATED OSTEOPOROSIS WITHOUT CURRENT PATHOLOGICAL FRACTURE: ICD-10-CM

## 2022-12-13 DIAGNOSIS — I10 ESSENTIAL HYPERTENSION: Primary | Chronic | ICD-10-CM

## 2022-12-13 DIAGNOSIS — E11.42 DIABETIC POLYNEUROPATHY ASSOCIATED WITH TYPE 2 DIABETES MELLITUS: ICD-10-CM

## 2022-12-13 DIAGNOSIS — E03.9 ACQUIRED HYPOTHYROIDISM: ICD-10-CM

## 2022-12-13 DIAGNOSIS — E78.5 DYSLIPIDEMIA: ICD-10-CM

## 2022-12-13 NOTE — TELEPHONE ENCOUNTER
Caller: Agapito Downey    Relationship: Self    Best call back number: *[phone]    What orders are you requesting (i.e. lab or imaging): BLOOD WORK FOR APPOINTMENT    In what timeframe would the patient need to come in: TOMORROW    Where will you receive your lab/imaging services: OFFICE LAB    Additional notes: PATIENT WOULD LIKE TO DO LAB WORK PRIOR TO APPOINTMENT. HE WOULD LIKE TO HAVE THESE IN FOR TOMORROW.

## 2022-12-15 ENCOUNTER — LAB (OUTPATIENT)
Dept: LAB | Facility: HOSPITAL | Age: 87
End: 2022-12-15

## 2022-12-15 DIAGNOSIS — E03.9 ACQUIRED HYPOTHYROIDISM: ICD-10-CM

## 2022-12-15 DIAGNOSIS — E11.42 DIABETIC POLYNEUROPATHY ASSOCIATED WITH TYPE 2 DIABETES MELLITUS: ICD-10-CM

## 2022-12-15 DIAGNOSIS — E78.5 DYSLIPIDEMIA: ICD-10-CM

## 2022-12-15 DIAGNOSIS — M81.0 AGE-RELATED OSTEOPOROSIS WITHOUT CURRENT PATHOLOGICAL FRACTURE: ICD-10-CM

## 2022-12-16 LAB
25(OH)D3+25(OH)D2 SERPL-MCNC: 80 NG/ML (ref 30–100)
ALBUMIN SERPL-MCNC: 4.4 G/DL (ref 3.6–4.6)
ALBUMIN/CREAT UR: 4 MG/G CREAT (ref 0–29)
ALBUMIN/GLOB SERPL: 1.6 {RATIO} (ref 1.2–2.2)
ALP SERPL-CCNC: 47 IU/L (ref 44–121)
ALT SERPL-CCNC: 14 IU/L (ref 0–44)
AST SERPL-CCNC: 15 IU/L (ref 0–40)
BILIRUB SERPL-MCNC: 0.7 MG/DL (ref 0–1.2)
BUN SERPL-MCNC: 16 MG/DL (ref 8–27)
BUN/CREAT SERPL: 14 (ref 10–24)
CALCIUM SERPL-MCNC: 9.4 MG/DL (ref 8.6–10.2)
CHLORIDE SERPL-SCNC: 103 MMOL/L (ref 96–106)
CHOLEST SERPL-MCNC: 148 MG/DL (ref 100–199)
CO2 SERPL-SCNC: 24 MMOL/L (ref 20–29)
CREAT SERPL-MCNC: 1.14 MG/DL (ref 0.76–1.27)
CREAT UR-MCNC: 150.2 MG/DL
EGFRCR SERPLBLD CKD-EPI 2021: 63 ML/MIN/1.73
GLOBULIN SER CALC-MCNC: 2.7 G/DL (ref 1.5–4.5)
GLUCOSE SERPL-MCNC: 154 MG/DL (ref 70–99)
HBA1C MFR BLD: 6.4 % (ref 4.8–5.6)
HDLC SERPL-MCNC: 49 MG/DL
LDLC SERPL CALC-MCNC: 63 MG/DL (ref 0–99)
MICROALBUMIN UR-MCNC: 6.3 UG/ML
POTASSIUM SERPL-SCNC: 4.5 MMOL/L (ref 3.5–5.2)
PROT SERPL-MCNC: 7.1 G/DL (ref 6–8.5)
SODIUM SERPL-SCNC: 143 MMOL/L (ref 134–144)
TRIGL SERPL-MCNC: 224 MG/DL (ref 0–149)
TSH SERPL DL<=0.005 MIU/L-ACNC: 7.07 UIU/ML (ref 0.45–4.5)
VLDLC SERPL CALC-MCNC: 36 MG/DL (ref 5–40)

## 2022-12-19 ENCOUNTER — OFFICE VISIT (OUTPATIENT)
Dept: INTERNAL MEDICINE | Facility: CLINIC | Age: 87
End: 2022-12-19

## 2022-12-19 VITALS
BODY MASS INDEX: 31.92 KG/M2 | DIASTOLIC BLOOD PRESSURE: 76 MMHG | HEIGHT: 67 IN | HEART RATE: 68 BPM | WEIGHT: 203.4 LBS | SYSTOLIC BLOOD PRESSURE: 142 MMHG | TEMPERATURE: 97.7 F

## 2022-12-19 DIAGNOSIS — E03.9 ACQUIRED HYPOTHYROIDISM: ICD-10-CM

## 2022-12-19 DIAGNOSIS — I10 ESSENTIAL HYPERTENSION: Chronic | ICD-10-CM

## 2022-12-19 DIAGNOSIS — E78.5 DYSLIPIDEMIA: ICD-10-CM

## 2022-12-19 DIAGNOSIS — E11.42 DIABETIC POLYNEUROPATHY ASSOCIATED WITH TYPE 2 DIABETES MELLITUS: Primary | ICD-10-CM

## 2022-12-19 PROCEDURE — 99214 OFFICE O/P EST MOD 30 MIN: CPT | Performed by: INTERNAL MEDICINE

## 2022-12-19 RX ORDER — BISOPROLOL FUMARATE 5 MG/1
1 TABLET, FILM COATED ORAL DAILY
COMMUNITY
Start: 2022-11-08

## 2022-12-19 RX ORDER — CLINDAMYCIN PHOSPHATE 11.9 MG/ML
1 SOLUTION TOPICAL DAILY PRN
COMMUNITY
Start: 2022-10-18

## 2022-12-19 RX ORDER — LEVOTHYROXINE SODIUM 0.07 MG/1
75 TABLET ORAL DAILY
Qty: 90 TABLET | Refills: 3 | Status: SHIPPED | OUTPATIENT
Start: 2022-12-19

## 2022-12-19 NOTE — PROGRESS NOTES
"[facility]     Agapito Downey  1935   [id number]      Patient Care Team:  Kendrick Oconnor MD as PCP - General  Federico Campbell MD as Consulting Physician (Cardiology)    Chief Complaint::   Chief Complaint   Patient presents with   • Hypertension   • Hyperlipidemia   • Diabetes        HPI  The patient presents for follow-up of type 2 diabetes mellitus, hypertension, dyslipidemia, and hypothyroidism.    Health maintenance  The patient states he is doing well overall. He states he saw Dr. Campbell a few days ago, who checked his pacemaker. He reports he was informed he had over 8 years worth of battery remaining. The patient relays that he experiences intermittent fatigue and weakness. He denies any triggering activities that cause this. He denies chest pain or shortness of breath. The patient notes that he is gaining weight recently. He denies any recent changes to his diet. The patient reports he is having problems cutting his toenails. He states he would not like to present to a podiatrist. He adds that he does not like going to a nail salon for a pedicure.    Gastric discomfort  The patient reports that early in the morning, he will wake up and his stomach feels \"stressed.\" He states it does not hurt, however, it feels odd. He notes that he suspects the cause of this discomfort is acid reflux. He states he generally has breakfast between 8:30 AM and 9:30 AM, lunch between 1:00 PM and 2:00 PM, and his evening meal is very light. He states he has tried Tums a couple of nights, which improved his symptoms. He reports he is getting hungry in the early morning at approximately 3:00 AM. He states he had been snacking on oyster crackers, however, he did not want to eat something so high in carbohydrates, so he switched to eating a small piece of turkey, and that seems to help.     Ear itching  The patient states the inside of his ears itch. He notes hearing clicks in his ear. He states " "he thinks it could be cerumen \"coming apart and going together.\"     Eye itching  The patient reports he has been experiencing a \"gooey\" substance building up in his bilateral eyes. He states he has been using over the counter Systane and Zaditor, however, they are not improving his symptoms. He inquires about a prescription to treat this.    Chronic Conditions:  Type 2 diabetes mellitus, hypertension, dyslipidemia, and hypothyroidism.    Patient Active Problem List   Diagnosis   • Coronary artery disease involving coronary bypass graft of native heart without angina pectoris   • AV block, 1st degree   • Essential hypertension   • Dyslipidemia   • Osteoarthritis   • Other fatigue   • Allergic rhinitis   • ASHD (arteriosclerotic heart disease)   • Angina pectoris ([facility])   • History of malignant neoplasm of prostate   • Snoring   • Osteoporosis   • Obesity   • Senile osteoporosis   • Irritable bowel syndrome   • Mobitz type 1 second degree AV block   • SSS (sick sinus syndrome) ([facility])   • Acquired hypothyroidism   • Diabetic polyneuropathy associated with type 2 diabetes mellitus ([facility])        Past Medical History:   Diagnosis Date   • AV bloc first degree    • CAD (coronary artery disease)    • Cancer (HCC)     prostate cancer seed implantion; Brachytherapy 2006, PSA has since been undetectable   • Diverticulosis    • Dyslipidemia    • Hyperlipidemia    • Hypertension     benign   • Nephrolithiasis 09/20/2014   • Osteoarthritis        Past Surgical History:   Procedure Laterality Date   • CARDIAC ELECTROPHYSIOLOGY PROCEDURE N/A 12/4/2019    Procedure: Device Implant     PPM IMPLANT;  Surgeon: Federico Campbell MD;  Location: [facility] INVASIVE LOCATION;  Service: Cardiology   • CHOLECYSTECTOMY  1995    laparoscopic   • CORONARY ARTERY BYPASS GRAFT  11/1994   • INSERTION PROSTATE RADIATION SEED  2006    Cancer, prostate; PSA has since been undetectable   • OTHER SURGICAL HISTORY      Prostate surgery with seed " implantation.   • TONSILLECTOMY  1943       Family History   Problem Relation Age of Onset   • Heart disease Other    • Coronary artery disease Brother         cause of death       Social History     Socioeconomic History   • Marital status:    Tobacco Use   • Smoking status: Never   • Smokeless tobacco: Never   Substance and Sexual Activity   • Alcohol use: No   • Drug use: No   • Sexual activity: Defer       Allergies   Allergen Reactions   • Reserpine Other (See Comments)     depression   • Statins Other (See Comments)     Myalgias and mood affect disorder  fatigue           Current Outpatient Medications:   •  ALPRAZolam (XANAX) 0.5 MG tablet, Take 1 tablet by mouth As Needed for Anxiety., Disp: 10 tablet, Rfl: 0  •  bisoprolol (ZEBeta) 5 MG tablet, Take 1 tablet by mouth Daily., Disp: , Rfl:   •  clindamycin (CLEOCIN T) 1 % external solution, Apply 1 application topically to the appropriate area as directed Daily As Needed., Disp: , Rfl:   •  D3-50 1.25 MG (81563 UT) capsule, TAKE ONE CAPSULE BY MOUTH ONCE A WEEK, Disp: 12 capsule, Rfl: 0  •  Eliquis 5 MG tablet tablet, Take 1 tablet by mouth 2 (Two) Times a Day., Disp: , Rfl:   •  Evolocumab (REPATHA) solution auto-injector SureClick injection, Inject 1 mL under the skin into the appropriate area as directed Every 14 (Fourteen) Days., Disp: , Rfl:   •  ketotifen (ZADITOR) 0.025 % ophthalmic solution, Apply 1 drop to eye(s) as directed by provider Daily As Needed., Disp: , Rfl:   •  levothyroxine (SYNTHROID, LEVOTHROID) 75 MCG tablet, Take 1 tablet by mouth Daily., Disp: 90 tablet, Rfl: 3  •  losartan (COZAAR) 50 MG tablet, Take 1 tablet by mouth Daily. (Patient taking differently: Take 50 mg by mouth 2 (Two) Times a Day.), Disp: , Rfl:   •  magnesium oxide (MAG-OX) 400 MG tablet, Take 400 mg by mouth Daily., Disp: , Rfl:   •  metFORMIN ER (GLUCOPHAGE-XR) 500 MG 24 hr tablet, Take 1 tablet by mouth Daily., Disp: 90 tablet, Rfl: 3  •  Multiple  "Vitamins-Minerals (CENTRUM SILVER 50+MEN PO), Take 1 tablet by mouth Daily., Disp: , Rfl:   •  Omega-3 Fatty Acids (fish oil) 1000 MG capsule capsule, Take 1,000 mg by mouth Daily With Breakfast., Disp: , Rfl:   •  polyethyl glycol-propyl glycol (SYSTANE) 0.4-0.3 % solution ophthalmic solution, Apply 1 drop to eye(s) as directed by provider Daily As Needed., Disp: , Rfl:   •  Probiotic Product (ALIGN) 4 MG capsule, Take 1 capsule by mouth Daily., Disp: , Rfl:   •  psyllium (METAMUCIL) 100 % pack packet, Take 1 packet by mouth Daily., Disp: , Rfl:   •  spironolactone (ALDACTONE) 25 MG tablet, TAKE ONE TABLET BY MOUTH EVERY OTHER DAY, Disp: 45 tablet, Rfl: 3    Review of Systems   A review of systems was performed, and positive findings are noted in the HPI.    Vital Signs  Vitals:    12/19/22 1140   BP: 142/76   BP Location: Left arm   Patient Position: Sitting   Cuff Size: Large Adult   Pulse: 68   Temp: 97.7 °F (36.5 °C)   Weight: 92.3 kg (203 lb 6.4 oz)   Height: 170.2 cm (67.01\")   PainSc: 0-No pain       Physical Exam  Vitals reviewed.   Constitutional:       Appearance: He is well-developed and overweight.   HENT:      Head: Normocephalic and atraumatic.   Cardiovascular:      Rate and Rhythm: Normal rate and regular rhythm.      Heart sounds: Normal heart sounds. No murmur heard.  Pulmonary:      Effort: Pulmonary effort is normal.      Breath sounds: Normal breath sounds.   Neurological:      Mental Status: He is alert and oriented to person, place, and time.          Procedures    ACE III MINI             Assessment/Plan:      1. Type 2 diabetes mellitus  - Hemoglobin A1c is up slightly at 6.4 percent. I have asked him to improve his diet and try to lose the weight he is gaining and see if it does not come down. If that has not happened by next time, we will adjust the metformin.    2. Hypertension  - Blood pressure is well controlled on bisoprolol, losartan, and spironolactone.    3. Dyslipidemia  - " Triglycerides remain elevated. Low-density lipoprotein is well controlled. Continue efforts at healthy diet. He has been statin intolerant.    4. Hypothyroidism  - Thyroid-stimulating hormone has crept up and is now over 7 micro-International Units/mL. We will increase levothyroxine to 75 mcg daily.    5. Nonspecific abdominal discomfort  - Could be mild gastritis. He will have a trial of famotidine 20 mg at bedtime.    Follow up in 6 months.    Diagnoses and all orders for this visit:    1. Diabetic polyneuropathy associated with type 2 diabetes mellitus (HCC) (Primary)    2. Essential hypertension    3. Dyslipidemia    4. Acquired hypothyroidism    Other orders  -     levothyroxine (SYNTHROID, LEVOTHROID) 75 MCG tablet; Take 1 tablet by mouth Daily.  Dispense: 90 tablet; Refill: 3          Plan of care reviewed with patient at the conclusion of today's visit. Education was provided regarding diagnosis, management, and any prescribed or recommended OTC medications.Patient verbalizes understanding of and agreement with management plan.         Kendrick Oconnor MD           Answers for HPI/ROS submitted by the patient on 12/12/2022  What is the primary reason for your visit?: Physical    Transcribed from ambient dictation for Kendrick Oconnor MD by Kelly Marrero.  12/19/22   15:40 EST    Patient or patient representative verbalized consent to the visit recording.  I have personally performed the services described in this document as transcribed by the above individual, and it is both accurate and complete.

## 2022-12-21 RX ORDER — METHOCARBAMOL 750 MG/1
TABLET ORAL
Qty: 12 CAPSULE | Refills: 3 | Status: SHIPPED | OUTPATIENT
Start: 2022-12-21

## 2022-12-21 NOTE — TELEPHONE ENCOUNTER
Rx Refill Note  Requested Prescriptions     Pending Prescriptions Disp Refills   • D3-50 1.25 MG (46916 UT) capsule [Pharmacy Med Name: D3-50 Oral Capsule 1.25 MG (65389 UT)] 12 capsule 0     Sig: TAKE 1 CAPSULE BY MOUTH ONCE A WEEK      Last office visit with prescribing clinician: 12/19/2022   Last telemedicine visit with prescribing clinician: Visit date not found   Next office visit with prescribing clinician: 6/20/2023                           Divya Cruz RN  12/21/22, 12:27 EST

## 2023-06-05 RX ORDER — METFORMIN HYDROCHLORIDE 500 MG/1
TABLET, EXTENDED RELEASE ORAL
Qty: 90 TABLET | Refills: 0 | Status: SHIPPED | OUTPATIENT
Start: 2023-06-05

## 2023-06-13 ENCOUNTER — TELEPHONE (OUTPATIENT)
Dept: INTERNAL MEDICINE | Facility: CLINIC | Age: 88
End: 2023-06-13
Payer: MEDICARE

## 2023-06-13 DIAGNOSIS — M81.0 AGE-RELATED OSTEOPOROSIS WITHOUT CURRENT PATHOLOGICAL FRACTURE: ICD-10-CM

## 2023-06-13 DIAGNOSIS — E78.5 DYSLIPIDEMIA: ICD-10-CM

## 2023-06-13 DIAGNOSIS — E11.42 DIABETIC POLYNEUROPATHY ASSOCIATED WITH TYPE 2 DIABETES MELLITUS: ICD-10-CM

## 2023-06-13 DIAGNOSIS — E03.9 ACQUIRED HYPOTHYROIDISM: ICD-10-CM

## 2023-06-13 DIAGNOSIS — I10 ESSENTIAL HYPERTENSION: Primary | Chronic | ICD-10-CM

## 2023-06-13 NOTE — TELEPHONE ENCOUNTER
"Caller: Agapito Downey \"Juan\"    Relationship: Self    Best call back number: [phone]     What orders are you requesting (i.e. lab or imaging): LAB WORK    In what timeframe would the patient need to come in: AS SOON AS POSSIBLE    Where [facility] you [facility] your lab[facility]imaging services[facility]      "

## 2023-06-15 ENCOUNTER — LAB (OUTPATIENT)
Dept: LAB | Facility: HOSPITAL | Age: 88
End: 2023-06-15
Payer: MEDICARE

## 2023-06-15 DIAGNOSIS — E78.5 DYSLIPIDEMIA: ICD-10-CM

## 2023-06-15 DIAGNOSIS — E11.42 DIABETIC POLYNEUROPATHY ASSOCIATED WITH TYPE 2 DIABETES MELLITUS: ICD-10-CM

## 2023-06-15 DIAGNOSIS — I10 ESSENTIAL HYPERTENSION: Chronic | ICD-10-CM

## 2023-06-15 DIAGNOSIS — M81.0 AGE-RELATED OSTEOPOROSIS WITHOUT CURRENT PATHOLOGICAL FRACTURE: ICD-10-CM

## 2023-06-15 DIAGNOSIS — E03.9 ACQUIRED HYPOTHYROIDISM: ICD-10-CM

## 2023-06-16 LAB
25(OH)D3+25(OH)D2 SERPL-MCNC: 88.4 NG/ML (ref 30–100)
ALBUMIN SERPL-MCNC: 4.4 G/DL (ref 3.5–5.2)
ALBUMIN/GLOB SERPL: 1.7 G/DL
ALP SERPL-CCNC: 43 U/L (ref 39–117)
ALT SERPL-CCNC: 16 U/L (ref 1–41)
AST SERPL-CCNC: 12 U/L (ref 1–40)
BASOPHILS # BLD AUTO: 0.06 10*3/MM3 (ref 0–0.2)
BASOPHILS NFR BLD AUTO: 0.8 % (ref 0–1.5)
BILIRUB SERPL-MCNC: 0.8 MG/DL (ref 0–1.2)
BUN SERPL-MCNC: 18 MG/DL (ref 8–23)
BUN/CREAT SERPL: 15.5 (ref 7–25)
CALCIUM SERPL-MCNC: 9.7 MG/DL (ref 8.6–10.5)
CHLORIDE SERPL-SCNC: 104 MMOL/L (ref 98–107)
CHOLEST SERPL-MCNC: 136 MG/DL (ref 0–200)
CO2 SERPL-SCNC: 23.9 MMOL/L (ref 22–29)
CREAT SERPL-MCNC: 1.16 MG/DL (ref 0.76–1.27)
EGFRCR SERPLBLD CKD-EPI 2021: 61.3 ML/MIN/1.73
EOSINOPHIL # BLD AUTO: 0.15 10*3/MM3 (ref 0–0.4)
EOSINOPHIL NFR BLD AUTO: 2.1 % (ref 0.3–6.2)
ERYTHROCYTE [DISTWIDTH] IN BLOOD BY AUTOMATED COUNT: 13 % (ref 12.3–15.4)
GLOBULIN SER CALC-MCNC: 2.6 GM/DL
GLUCOSE SERPL-MCNC: 141 MG/DL (ref 65–99)
HBA1C MFR BLD: 6.4 % (ref 4.8–5.6)
HCT VFR BLD AUTO: 47.9 % (ref 37.5–51)
HDLC SERPL-MCNC: 48 MG/DL (ref 40–60)
HGB BLD-MCNC: 16.4 G/DL (ref 13–17.7)
IMM GRANULOCYTES # BLD AUTO: 0.01 10*3/MM3 (ref 0–0.05)
IMM GRANULOCYTES NFR BLD AUTO: 0.1 % (ref 0–0.5)
LDLC SERPL CALC-MCNC: 55 MG/DL (ref 0–100)
LYMPHOCYTES # BLD AUTO: 1.91 10*3/MM3 (ref 0.7–3.1)
LYMPHOCYTES NFR BLD AUTO: 26.6 % (ref 19.6–45.3)
MCH RBC QN AUTO: 31.7 PG (ref 26.6–33)
MCHC RBC AUTO-ENTMCNC: 34.2 G/DL (ref 31.5–35.7)
MCV RBC AUTO: 92.5 FL (ref 79–97)
MONOCYTES # BLD AUTO: 0.53 10*3/MM3 (ref 0.1–0.9)
MONOCYTES NFR BLD AUTO: 7.4 % (ref 5–12)
NEUTROPHILS # BLD AUTO: 4.52 10*3/MM3 (ref 1.7–7)
NEUTROPHILS NFR BLD AUTO: 63 % (ref 42.7–76)
NRBC BLD AUTO-RTO: 0 /100 WBC (ref 0–0.2)
PLATELET # BLD AUTO: 281 10*3/MM3 (ref 140–450)
POTASSIUM SERPL-SCNC: 4.4 MMOL/L (ref 3.5–5.2)
PROT SERPL-MCNC: 7 G/DL (ref 6–8.5)
RBC # BLD AUTO: 5.18 10*6/MM3 (ref 4.14–5.8)
SODIUM SERPL-SCNC: 139 MMOL/L (ref 136–145)
TRIGL SERPL-MCNC: 206 MG/DL (ref 0–150)
TSH SERPL DL<=0.005 MIU/L-ACNC: 5.43 UIU/ML (ref 0.27–4.2)
VLDLC SERPL CALC-MCNC: 33 MG/DL (ref 5–40)
WBC # BLD AUTO: 7.18 10*3/MM3 (ref 3.4–10.8)

## 2023-06-20 PROBLEM — M81.0 OSTEOPOROSIS: Status: RESOLVED | Noted: 2019-05-01 | Resolved: 2023-06-20

## 2023-06-20 PROBLEM — M81.0 SENILE OSTEOPOROSIS: Status: RESOLVED | Noted: 2019-05-01 | Resolved: 2023-06-20

## 2023-08-21 RX ORDER — SPIRONOLACTONE 25 MG/1
TABLET ORAL
Qty: 45 TABLET | Refills: 5 | Status: SHIPPED | OUTPATIENT
Start: 2023-08-21

## 2023-08-21 NOTE — TELEPHONE ENCOUNTER
Rx Refill Note  Requested Prescriptions     Pending Prescriptions Disp Refills    spironolactone (ALDACTONE) 25 MG tablet [Pharmacy Med Name: Spironolactone Oral Tablet 25 MG] 45 tablet 0     Sig: TAKE ONE TABLET BY MOUTH EVERY OTHER DAY      Last office visit with prescribing clinician: 6/20/2023   Last telemedicine visit with prescribing clinician: Visit date not found   Next office visit with prescribing clinician: 12/20/2023                         Would you like a call back once the refill request has been completed: [] Yes [] No    If the office needs to give you a call back, can they leave a voicemail: [] Yes [] No    Eliz Bryant LPN  08/21/23, 09:09 EDT

## 2023-09-01 RX ORDER — METFORMIN HYDROCHLORIDE 500 MG/1
TABLET, EXTENDED RELEASE ORAL
Qty: 90 TABLET | Refills: 1 | Status: SHIPPED | OUTPATIENT
Start: 2023-09-01

## 2023-09-22 ENCOUNTER — TELEPHONE (OUTPATIENT)
Dept: INTERNAL MEDICINE | Facility: CLINIC | Age: 88
End: 2023-09-22

## 2023-09-22 DIAGNOSIS — F41.8 SITUATIONAL ANXIETY: ICD-10-CM

## 2023-09-22 DIAGNOSIS — F41.8 SITUATIONAL ANXIETY: Primary | ICD-10-CM

## 2023-09-22 RX ORDER — ALPRAZOLAM 0.5 MG/1
0.5 TABLET ORAL AS NEEDED
Qty: 10 TABLET | Refills: 0 | Status: SHIPPED | OUTPATIENT
Start: 2023-09-22 | End: 2023-09-22 | Stop reason: SDUPTHER

## 2023-09-22 RX ORDER — ALPRAZOLAM 0.5 MG/1
0.5 TABLET ORAL 2 TIMES DAILY PRN
Qty: 10 TABLET | Refills: 0 | Status: SHIPPED | OUTPATIENT
Start: 2023-09-22

## 2023-09-22 NOTE — TELEPHONE ENCOUNTER
JAREN CALLED FROM [facility].  SHE NEEDS KNOW MORE  SPECIFIC INSTRUCTIONS FOR   ALPRAZOLAM.  IT STATES AS NEEDED.  PLEASE CALL BACK  [phone]   [phone]    THIS IS A CONTROLED SUBSTANCE.

## 2023-10-08 ENCOUNTER — HOSPITAL ENCOUNTER (EMERGENCY)
Facility: HOSPITAL | Age: 88
Discharge: LEFT BEFORE DISCHARGE | End: 2023-10-08
Attending: EMERGENCY MEDICINE
Payer: MEDICARE

## 2023-10-08 VITALS
WEIGHT: 201 LBS | BODY MASS INDEX: 39.46 KG/M2 | SYSTOLIC BLOOD PRESSURE: 174 MMHG | HEART RATE: 71 BPM | HEIGHT: 60 IN | RESPIRATION RATE: 18 BRPM | OXYGEN SATURATION: 93 % | DIASTOLIC BLOOD PRESSURE: 104 MMHG

## 2023-10-08 DIAGNOSIS — Z86.79 HISTORY OF ATRIAL FIBRILLATION: ICD-10-CM

## 2023-10-08 DIAGNOSIS — R04.0 EPISTAXIS: Primary | ICD-10-CM

## 2023-10-08 PROCEDURE — 99211 OFF/OP EST MAY X REQ PHY/QHP: CPT | Performed by: EMERGENCY MEDICINE

## 2023-10-08 PROCEDURE — 99281 EMR DPT VST MAYX REQ PHY/QHP: CPT

## 2023-10-08 RX ORDER — OXYMETAZOLINE HYDROCHLORIDE 0.05 G/100ML
1 SPRAY NASAL ONCE
Status: DISCONTINUED | OUTPATIENT
Start: 2023-10-08 | End: 2023-10-08 | Stop reason: HOSPADM

## 2023-10-08 RX ORDER — TRANEXAMIC ACID 100 MG/ML
500 INJECTION, SOLUTION INTRAVENOUS ONCE
Status: DISCONTINUED | OUTPATIENT
Start: 2023-10-08 | End: 2023-10-08 | Stop reason: HOSPADM

## 2023-10-09 NOTE — ED PROVIDER NOTES
Subjective   History of Present Illness  88-year-old male presents for evaluation of nosebleed.  Of note, the patient is anticoagulated with Eliquis for atrial fibrillation.  He tells me that about an hour before coming to the emergency department he began experiencing epistaxis from his right nare that has persisted since that time, prompting his visit to the emergency department.  He denies any sensation of swallowing blood.  He denies any preceding trauma to his nose.  He is unsure as to what may have triggered the bleeding.  As a result of his ongoing symptoms he came here.      Review of Systems   HENT:  Positive for nosebleeds.    All other systems reviewed and are negative.      Past Medical History:   Diagnosis Date    AV bloc first degree     CAD (coronary artery disease)     Cancer     prostate cancer seed implantion; Brachytherapy 2006, PSA has since been undetectable    Diverticulosis     Dyslipidemia     Hyperlipidemia     Hypertension     benign    Myocardial infarction 1994    Nephrolithiasis 09/20/2014    Osteoarthritis        Allergies   Allergen Reactions    Reserpine Other (See Comments)     depression    Statins Other (See Comments)     Myalgias and mood affect disorder  fatigue         Past Surgical History:   Procedure Laterality Date    CARDIAC ELECTROPHYSIOLOGY PROCEDURE N/A 12/4/2019    Procedure: Device Implant     PPM IMPLANT;  Surgeon: Federico Campbell MD;  [facility];  Service: Cardiology    CHOLECYSTECTOMY  1995    laparoscopic    CORONARY ARTERY BYPASS GRAFT  11/1994    INSERTION PROSTATE RADIATION SEED  2006    Cancer, prostate; PSA has since been undetectable    OTHER SURGICAL HISTORY      Prostate surgery with seed implantation.    TONSILLECTOMY  1943       Family History   Problem Relation Age of Onset    Heart disease Other     Coronary artery disease Brother         cause of death    Heart disease Brother        Social History     Socioeconomic  History    Marital status:    Tobacco Use    Smoking status: Never    Smokeless tobacco: Never   Substance and Sexual Activity    Alcohol use: No    Drug use: No    Sexual activity: Not Currently     Partners: Female           Objective   Physical Exam  Vitals and nursing note reviewed.   Constitutional:       General: He is not in acute distress.     Appearance: He is well-developed. He is not diaphoretic.      Comments: Nontoxic-appearing male   HENT:      Head: Normocephalic and atraumatic.      Comments: Tissue paper noted to right nare, no active bleeding around the tissue paper noted     Mouth/Throat:      Comments: Posterior oropharynx is devoid of any blood  Neck:      Vascular: No JVD.   Cardiovascular:      Rate and Rhythm: Normal rate and regular rhythm.      Heart sounds: Normal heart sounds. No murmur heard.     No friction rub. No gallop.   Pulmonary:      Effort: Pulmonary effort is normal. No respiratory distress.      Breath sounds: Normal breath sounds. No wheezing or rales.   Musculoskeletal:         General: No signs of injury.      Cervical back: Normal range of motion.   Skin:     General: Skin is warm and dry.      Coloration: Skin is not pale.      Findings: No erythema or rash.   Neurological:      Mental Status: He is alert and oriented to person, place, and time.      Comments: Normal gait   Psychiatric:         Thought Content: Thought content normal.         Judgment: Judgment normal.         Procedures           ED Course  ED Course as of 10/09/23 0102   Sun Oct 08, 2023   1845 88-year-old male presents for evaluation of nosebleed.  Of note, the patient is anticoagulated with Eliquis for atrial fibrillation.  He tells me that about an hour before coming to the emergency department he began experiencing epistaxis from his right nare that has persisted since that time, prompting his visit to the ED.  On arrival, the patient is nontoxic-appearing.  His right nare is packed with  "toilet paper but there is no active epistaxis ongoing at this time.  Oropharynx is devoid of any blood.  We will obtain labs, we will continue to apply direct pressure, and we will reassess following initial medications. [DD]   2013 I was notified by nursing that the patient had eloped from the lobby as he is bleeding had resolved.  I contacted him via his cell phone.  He did not answer but I left him a voicemail and advised him to return to the emergency department for recurrent or worsening bleeding if he fails conservative measures. [DD]   2124 The patient called me back and told me that while waiting in the waiting room his bleeding had completely resolved so he ended up leaving prior to receiving any medications.  He notes that he is now completely asymptomatic.  Reassured.  He will follow-up with his primary care physician as needed. [DD]      ED Course User Index  [DD] Agustín Bragg MD                                       No results found for this or any previous visit (from the past 24 hour(s)).  Note: In addition to lab results from this visit, the labs listed above may include labs taken at another facility or during a different encounter within the last 24 hours. Please correlate lab times with ED admission and discharge times for further clarification of the services performed during this visit.    No orders to display     Vitals:    10/08/23 1811   BP: (!) 174/104   Pulse: 71   Resp: 18   SpO2: 93%   Weight: 91.2 kg (201 lb)   Height: 91.3 cm (35.95\")     Medications - No data to display  ECG/EMG Results (last 24 hours)       ** No results found for the last 24 hours. **          No orders to display           Medical Decision Making      Final diagnoses:   Epistaxis   History of atrial fibrillation       ED Disposition  ED Disposition       ED Disposition   Eloped    Condition   --    Comment   --               No follow-up provider specified.       Medication List      No changes were made to " your prescriptions during this visit.            Agustín Bragg MD  10/09/23 0104

## 2023-10-10 ENCOUNTER — TELEPHONE (OUTPATIENT)
Dept: INTERNAL MEDICINE | Facility: CLINIC | Age: 88
End: 2023-10-10
Payer: MEDICARE

## 2023-10-10 NOTE — TELEPHONE ENCOUNTER
"----- Message from Etelvina Lopez LPN sent at 10/10/2023  9:25 AM EDT -----  Regarding: FW: anxiety  Contact: [phone]  I forwarded message to front staff to schedule       ----- Message -----  From: Agapito Downey \"Juan\"  Sent: 10/10/2023   9:23 AM EDT  To: [facility] [address]  Subject: anxiety                                          I continue to struggle with anxiety. Would like to see you for office visit to discuss additional mitigating efforts.    "

## 2023-10-10 NOTE — TELEPHONE ENCOUNTER
Scheduled appointment for tomorrow 10/11/23 at 4:45pm. Pt wanted to know if he could see you sooner but I did not see any availability.

## 2023-10-11 ENCOUNTER — OFFICE VISIT (OUTPATIENT)
Dept: INTERNAL MEDICINE | Facility: CLINIC | Age: 88
End: 2023-10-11
Payer: MEDICARE

## 2023-10-11 VITALS
BODY MASS INDEX: 30.24 KG/M2 | HEIGHT: 69 IN | SYSTOLIC BLOOD PRESSURE: 156 MMHG | WEIGHT: 204.2 LBS | TEMPERATURE: 97.8 F | DIASTOLIC BLOOD PRESSURE: 86 MMHG | HEART RATE: 68 BPM

## 2023-10-11 DIAGNOSIS — F41.8 SITUATIONAL ANXIETY: ICD-10-CM

## 2023-10-11 PROCEDURE — 1160F RVW MEDS BY RX/DR IN RCRD: CPT | Performed by: INTERNAL MEDICINE

## 2023-10-11 PROCEDURE — 99213 OFFICE O/P EST LOW 20 MIN: CPT | Performed by: INTERNAL MEDICINE

## 2023-10-11 PROCEDURE — 1159F MED LIST DOCD IN RCRD: CPT | Performed by: INTERNAL MEDICINE

## 2023-10-11 RX ORDER — ALPRAZOLAM 0.5 MG/1
0.5 TABLET ORAL 2 TIMES DAILY PRN
Qty: 20 TABLET | Refills: 0 | Status: SHIPPED | OUTPATIENT
Start: 2023-10-11

## 2023-10-11 NOTE — PROGRESS NOTES
[facility]     Agapito Downey  1935   [id number]      Patient Care Team:  Kendrick Oconnor MD as PCP - General  Federico Campbell MD as Consulting Physician (Cardiology)    Chief Complaint::   Chief Complaint   Patient presents with    Anxiety        HPI  The patient comes in with anxiety.    Anxiety  The patient is struggling with anxiety and other things. His quality of life has decreased in the last 3 months. In the middle of 09/2023, he began to feel anxious, which was unusual for him. He started looking at his side effects documents and noticed that one of the significant side effects of Cardura is anxiety. When he went to see Dr. Campbell on 09/19/2023, and the patient told him that he was having trouble with anxiety and that he thinks it is a side effect of Cardura. The patient was asked a few questions, and Dr. Campbell discontinued the Cardura. The patient is wary of drugs since his 1980 experience where a medication caused depression for a year. Currently, he feels tense for no reason, and it affects him mostly at night. He will wake up at 3:00 AM and be immediately tense.  He gets up and paces or reads something. He will then return to bed and lay down, and he is still tense. He was taking Cardura for 2.5 months, and he figures it will take 2.5 months for it to get out of his system. The patient was given 10 tablets of Xanax, and he only takes 0.5 tablet at a time. This does help sometimes. The patient has never had anxiety in the past, but he has had depression. He notes that this anxiety is a completely different feeling. He took Reserpine in the last month of 1979, which a doctor gave him for hypertension. He claims that the primary ingredient in reserpine is Regroton. The patient had nothing to be depressed about while he was on Cardura. At one point, he was in the emergency room, and he talked to his doctor about Cardura. This doctor confirmed that some people do become  "depressed on Cardura.     Epistaxis  The patient was in the emergency room on 10/08/2023 with epistaxis that would not stop.     Medication side effects.  The patient is very concerned about side effects, and he reads off the side effects from the medication that he is taking. He notes that losartan can cause general fatigue, trouble sleeping, nasal congestion, rhinitis, loss of appetite, and dry mouth. Bisoprolol can cause trouble sleeping. Metformin can cause rhinitis. Spironolactone can cause tightness in the chest, fatigue and weakness. Repatha can cause muscle stiffness, nervousness, nasal congestion, rhinitis, and dry mouth. The patient notes that he has \"terrible\" dry mouth. Eliquis can cause epistaxis which he was in the emergency room for.      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    Diverticulosis     Dyslipidemia     Hyperlipidemia     Hypertension     benign    Myocardial infarction 1994    Nephrolithiasis 09/20/2014    Osteoarthritis        Past Surgical History:   Procedure Laterality Date    CARDIAC ELECTROPHYSIOLOGY PROCEDURE N/A 12/4/2019    Procedure: Device Implant     PPM IMPLANT;  Surgeon: Federico Campbell " MD TONY;  Location: [facility] INVASIVE LOCATION;  Service: Cardiology    CHOLECYSTECTOMY  1995    laparoscopic    CORONARY ARTERY BYPASS GRAFT  11/1994    INSERTION PROSTATE RADIATION SEED  2006    Cancer, prostate; PSA has since been undetectable    OTHER SURGICAL HISTORY      Prostate surgery with seed implantation.    TONSILLECTOMY  1943       Family History   Problem Relation Age of Onset    Heart disease Other     Coronary artery disease Brother         cause of death    Heart disease Brother     Heart disease Father        Social History     Socioeconomic History    Marital status:    Tobacco Use    Smoking status: Never    Smokeless tobacco: Never   Substance and Sexual Activity    Alcohol use: No    Drug use: No    Sexual activity: Not Currently     Partners: Female       Allergies   Allergen Reactions    Reserpine Other (See Comments)     depression    Statins Other (See Comments)     Myalgias and mood affect disorder  fatigue           Current Outpatient Medications:     ALPRAZolam (XANAX) 0.5 MG tablet, Take 1 tablet by mouth 2 (Two) Times a Day As Needed for Anxiety., Disp: 20 tablet, Rfl: 0    bisoprolol (ZEBeta) 5 MG tablet, Take 1 tablet by mouth Daily., Disp: , Rfl:     clindamycin (CLEOCIN T) 1 % external solution, Apply 1 application topically to the appropriate area as directed Daily As Needed., Disp: , Rfl:     D3-50 1.25 MG (71268 UT) capsule, TAKE 1 CAPSULE BY MOUTH ONCE A WEEK, Disp: 12 capsule, Rfl: 3    Eliquis 5 MG tablet tablet, Take 1 tablet by mouth 2 (Two) Times a Day., Disp: , Rfl:     Evolocumab (REPATHA) solution auto-injector SureClick injection, Inject 1 mL under the skin into the appropriate area as directed Every 14 (Fourteen) Days., Disp: , Rfl:     ketotifen (ZADITOR) 0.025 % ophthalmic solution, Apply 1 drop to eye(s) as directed by provider Daily As Needed., Disp: , Rfl:     levothyroxine (SYNTHROID, LEVOTHROID) 75 MCG tablet, Take 1 tablet by mouth Daily., Disp: 90  "tablet, Rfl: 3    losartan (COZAAR) 50 MG tablet, Take 1 tablet by mouth Daily. (Patient taking differently: Take 1 tablet by mouth 2 (Two) Times a Day.), Disp: , Rfl:     magnesium oxide (MAG-OX) 400 MG tablet, Take 1 tablet by mouth Daily., Disp: , Rfl:     metFORMIN ER (GLUCOPHAGE-XR) 500 MG 24 hr tablet, TAKE ONE TABLET BY MOUTH ONE TIME DAILY, Disp: 90 tablet, Rfl: 1    Multiple Vitamins-Minerals (CENTRUM SILVER 50+MEN PO), Take 1 tablet by mouth Daily., Disp: , Rfl:     Omega-3 Fatty Acids (fish oil) 1000 MG capsule capsule, Take 1 capsule by mouth Daily With Breakfast., Disp: , Rfl:     polyethyl glycol-propyl glycol (SYSTANE) 0.4-0.3 % solution ophthalmic solution, Apply 1 drop to eye(s) as directed by provider Daily As Needed., Disp: , Rfl:     Probiotic Product (ALIGN) 4 MG capsule, Take 1 capsule by mouth Daily., Disp: , Rfl:     psyllium (METAMUCIL) 100 % pack packet, Take 1 packet by mouth Daily., Disp: , Rfl:     spironolactone (ALDACTONE) 25 MG tablet, TAKE ONE TABLET BY MOUTH EVERY OTHER DAY, Disp: 45 tablet, Rfl: 5    Review of Systems   Unremarkable    Vital Signs  Vitals:    10/11/23 1654   BP: 156/86   BP Location: Left arm   Patient Position: Sitting   Cuff Size: Adult   Pulse: 68   Temp: 97.8 øF (36.6 øC)   Weight: 92.6 kg (204 lb 3.2 oz)   Height: 175.3 cm (69.02\")   PainSc: 0-No pain       Physical Exam  Constitutional:       General: He is not in acute distress.     Appearance: Normal appearance.   Pulmonary:      Effort: No respiratory distress.   Neurological:      Mental Status: He is alert and oriented to person, place, and time.      Cranial Nerves: No cranial nerve deficit.   Psychiatric:         Mood and Affect: Mood normal.         Behavior: Behavior normal.          Procedures    ACE III MINI             Assessment/Plan:    Diagnoses and all orders for this visit:    1. Situational anxiety  -     ALPRAZolam (XANAX) 0.5 MG tablet; Take 1 tablet by mouth 2 (Two) Times a Day As Needed " for Anxiety.  Dispense: 20 tablet; Refill: 0        1. Anxiety  - It is possible these symptoms could be due to Cardura, but he has been off that medication for 3 weeks. He would like to give it a little bit longer. He has been taking 0.25 mg of alprazolam with little effect. For the next week or two. He will take 0.5 mg once a day as needed. He will communicate with me to let me know how he is doing. We will follow up as scheduled.      Plan of care reviewed with patient at the conclusion of today's visit. Education was provided regarding diagnosis, management, and any prescribed or recommended OTC medications.Patient verbalizes understanding of and agreement with management plan.         Kendrick Oconnor MD       Transcribed from ambient dictation for Kendrick Oconnor MD by Roz Cartwright.  10/11/23   19:33 EDT    Patient or patient representative verbalized consent to the visit recording.  I have personally performed the services described in this document as transcribed by the above individual, and it is both accurate and complete.

## 2023-10-25 ENCOUNTER — HOSPITAL ENCOUNTER (EMERGENCY)
Facility: HOSPITAL | Age: 88
Discharge: HOME | End: 2023-10-25
Attending: EMERGENCY MEDICINE
Payer: MEDICARE

## 2023-10-25 VITALS
HEART RATE: 70 BPM | HEIGHT: 68 IN | DIASTOLIC BLOOD PRESSURE: 103 MMHG | TEMPERATURE: 97.6 F | RESPIRATION RATE: 16 BRPM | SYSTOLIC BLOOD PRESSURE: 163 MMHG | WEIGHT: 195 LBS | BODY MASS INDEX: 29.55 KG/M2 | OXYGEN SATURATION: 96 %

## 2023-10-25 DIAGNOSIS — R04.0 EPISTAXIS: Primary | ICD-10-CM

## 2023-10-25 LAB
BASOPHILS # BLD AUTO: 0.03 10*3/MM3 (ref 0–0.2)
BASOPHILS NFR BLD AUTO: 0.4 % (ref 0–1.5)
DEPRECATED RDW RBC AUTO: 46.6 FL (ref 37–54)
EOSINOPHIL # BLD AUTO: 0.17 10*3/MM3 (ref 0–0.4)
EOSINOPHIL NFR BLD AUTO: 2.1 % (ref 0.3–6.2)
ERYTHROCYTE [DISTWIDTH] IN BLOOD BY AUTOMATED COUNT: 13 % (ref 12.3–15.4)
HCT VFR BLD AUTO: 45.4 % (ref 37.5–51)
HGB BLD-MCNC: 15 G/DL (ref 13–17.7)
IMM GRANULOCYTES # BLD AUTO: 0.03 10*3/MM3 (ref 0–0.05)
IMM GRANULOCYTES NFR BLD AUTO: 0.4 % (ref 0–0.5)
LYMPHOCYTES # BLD AUTO: 1.68 10*3/MM3 (ref 0.7–3.1)
LYMPHOCYTES NFR BLD AUTO: 21 % (ref 19.6–45.3)
MCH RBC QN AUTO: 32.3 PG (ref 26.6–33)
MCHC RBC AUTO-ENTMCNC: 33 G/DL (ref 31.5–35.7)
MCV RBC AUTO: 97.8 FL (ref 79–97)
MONOCYTES # BLD AUTO: 0.57 10*3/MM3 (ref 0.1–0.9)
MONOCYTES NFR BLD AUTO: 7.1 % (ref 5–12)
NEUTROPHILS NFR BLD AUTO: 5.51 10*3/MM3 (ref 1.7–7)
NEUTROPHILS NFR BLD AUTO: 69 % (ref 42.7–76)
NRBC BLD AUTO-RTO: 0 /100 WBC (ref 0–0.2)
PLATELET # BLD AUTO: 243 10*3/MM3 (ref 140–450)
PMV BLD AUTO: 10.3 FL (ref 6–12)
RBC # BLD AUTO: 4.64 10*6/MM3 (ref 4.14–5.8)
WBC NRBC COR # BLD: 7.99 10*3/MM3 (ref 3.4–10.8)

## 2023-10-25 PROCEDURE — 36415 COLL VENOUS BLD VENIPUNCTURE: CPT

## 2023-10-25 PROCEDURE — 99282 EMERGENCY DEPT VISIT SF MDM: CPT

## 2023-10-25 PROCEDURE — 85025 COMPLETE CBC W/AUTO DIFF WBC: CPT | Performed by: PHYSICIAN ASSISTANT

## 2023-10-25 RX ORDER — TRANEXAMIC ACID 100 MG/ML
500 INJECTION, SOLUTION INTRAVENOUS ONCE
Status: DISCONTINUED | OUTPATIENT
Start: 2023-10-25 | End: 2023-10-25 | Stop reason: HOSPADM

## 2023-10-25 NOTE — ED PROVIDER NOTES
Subjective   History of Present Illness  88-year-old male presents emergency department with epistaxis.  He is on Eliquis and he states that occasionally will have bleeding on the right side of his nose.  He brought some over-the-counter Rhino Rocket's from [facility] and actually placed one and it stopped the bleeding.  Since he got here he does not bled at all.  He has had no fevers no chills no other complaints.    History provided by:  Patient   used: No    Nose Bleed  Location:  R nare  Severity:  Moderate  Duration:  4 hours  Timing:  Intermittent  Progression:  Waxing and waning  Chronicity:  Recurrent  Context: anticoagulants    Associated symptoms: no fever        Review of Systems   Constitutional:  Negative for chills and fever.   HENT:  Positive for nosebleeds.    Respiratory:  Negative for chest tightness and shortness of breath.    Cardiovascular:  Negative for chest pain and palpitations.   All other systems reviewed and are negative.      Past Medical History:   Diagnosis Date    AV bloc first degree     CAD (coronary artery disease)     Cancer     prostate cancer seed implantion; Brachytherapy 2006, PSA has since been undetectable    Cholelithiasis ?    Diverticulosis     Dyslipidemia     Hyperlipidemia     Hypertension     benign    Myocardial infarction 1994    Nephrolithiasis 09/20/2014    Osteoarthritis        Allergies   Allergen Reactions    Reserpine Other (See Comments)     depression    Statins Other (See Comments)     Myalgias and mood affect disorder  fatigue         Past Surgical History:   Procedure Laterality Date    CARDIAC ELECTROPHYSIOLOGY PROCEDURE N/A 12/4/2019    Procedure: Device Implant     PPM IMPLANT;  Surgeon: Federico Campbell MD;  Location: [facility] INVASIVE LOCATION;  Service: Cardiology    CHOLECYSTECTOMY  1995    laparoscopic    CORONARY ARTERY BYPASS GRAFT  11/1994    INSERTION PROSTATE RADIATION SEED  2006    Cancer, prostate; PSA has since been  undetectable    OTHER SURGICAL HISTORY      Prostate surgery with seed implantation.    TONSILLECTOMY  1943       Family History   Problem Relation Age of Onset    Heart disease Other     Coronary artery disease Brother         cause of death    Heart disease Brother     Heart disease Father        Social History     Socioeconomic History    Marital status:    Tobacco Use    Smoking status: Never    Smokeless tobacco: Never   Substance and Sexual Activity    Alcohol use: No    Drug use: No    Sexual activity: Not Currently     Partners: Female           Objective   Physical Exam  Vitals and nursing note reviewed.   Constitutional:       Appearance: He is well-developed.   HENT:      Head: Normocephalic and atraumatic.      Right Ear: External ear normal.      Left Ear: External ear normal.      Nose: Nose normal.      Comments: Right nares is packed with an over-the-counter what looked like a Rhino Rocket.  It is expanded and there is no bleeding down the posterior pharynx or out of the nares.  Eyes:      General: No scleral icterus.     Conjunctiva/sclera: Conjunctivae normal.   Neck:      Thyroid: No thyromegaly.   Pulmonary:      Effort: Pulmonary effort is normal. No respiratory distress.   Musculoskeletal:         General: Normal range of motion.      Cervical back: Normal range of motion.   Lymphadenopathy:      Cervical: No cervical adenopathy.   Skin:     General: Skin is warm and dry.   Neurological:      Mental Status: He is alert and oriented to person, place, and time.      Cranial Nerves: No cranial nerve deficit.      Coordination: Coordination normal.      Deep Tendon Reflexes: Reflexes are normal and symmetric. Reflexes normal.   Psychiatric:         Behavior: Behavior normal.         Thought Content: Thought content normal.         Judgment: Judgment normal.         Procedures           ED Course                No results found for this or any previous visit (from the past 24  "hour(s)).    Note: In addition to lab results from this visit, the labs listed above may include labs taken at another facility or during a different encounter within the last 24 hours. Please correlate lab times with ED admission and discharge times for further clarification of the services performed during this visit.    No orders to display     Vitals:    10/25/23 1004   BP: (!) 163/103   BP Location: Left arm   Patient Position: Sitting   Pulse: 70   Resp: 16   Temp: 97.6 °F (36.4 °C)   TempSrc: Oral   SpO2: 96%   Weight: 88.5 kg (195 lb)   Height: 172.7 cm (68\")     Medications - No data to display    ECG/EMG Results (last 24 hours)       ** No results found for the last 24 hours. **          No orders to display                                  Medical Decision Making  Mr. Downey had already placed his own OTC Rhino Rocket.  He did not have any bleeding he was watched for 2 hours blood counts were okay he has had no further bleeding he was referred to ENT to have this removed and to be seen.    Problems Addressed:  Epistaxis: complicated acute illness or injury    Amount and/or Complexity of Data Reviewed  Labs: ordered.    Risk  Prescription drug management.        Final diagnoses:   Epistaxis       ED Disposition  ED Disposition       ED Disposition   Discharge    Condition   Stable    Comment   --               Adriel Leach MD  [address] [address] [phone]      Call for appointment         Medication List        Changed      losartan 50 MG tablet  Commonly known as: COZAAR  Take 1 tablet by mouth Daily.  What changed: when to take this                 Shar Mary PA  10/26/23 1932    "

## 2023-10-25 NOTE — DISCHARGE INSTRUCTIONS
Do not remove packing for at least 48 hours preferably with all but the ENT remove it.  Return here for any problems or complications.  Avoid spicy foods temperature hot foods and fluids hot showers etc.  Turn here for any problems.

## 2023-10-26 ENCOUNTER — TELEPHONE (OUTPATIENT)
Dept: INTERNAL MEDICINE | Facility: CLINIC | Age: 88
End: 2023-10-26
Payer: MEDICARE

## 2023-10-26 DIAGNOSIS — R04.0 EPISTAXIS: Primary | ICD-10-CM

## 2023-10-26 NOTE — TELEPHONE ENCOUNTER
Typically work with the group at the [facility], [facility], [facility] and others.  I can make referral if needed.

## 2023-10-26 NOTE — TELEPHONE ENCOUNTER
"Sent via text yesterday at 1:31PM:    \"Continuing problem with nosebleeds. ER doc recommends BIANCA Leach for possible cauterization. Who/what do you recommend?\"  "

## 2023-10-26 NOTE — TELEPHONE ENCOUNTER
Patient understood and verbalized understanding.    He requested if you could please place a referral? He requested it to be urgent if possible

## 2023-10-30 DIAGNOSIS — F41.8 SITUATIONAL ANXIETY: ICD-10-CM

## 2023-10-30 RX ORDER — ALPRAZOLAM 0.5 MG/1
0.5 TABLET ORAL 2 TIMES DAILY PRN
Qty: 20 TABLET | Refills: 0 | Status: SHIPPED | OUTPATIENT
Start: 2023-10-30

## 2023-10-30 NOTE — TELEPHONE ENCOUNTER
Rx Refill Note  Requested Prescriptions     Pending Prescriptions Disp Refills    ALPRAZolam (XANAX) 0.5 MG tablet [Pharmacy Med Name: ALPRAZolam Oral Tablet 0.5 MG] 20 tablet 0     Sig: TAKE ONE TABLET BY MOUTH TWICE DAILY AS NEEDED      Last office visit with prescribing clinician: 10/11/2023   Last telemedicine visit with prescribing clinician: Visit date not found   Next office visit with prescribing clinician: 12/20/2023                         Would you like a call back once the refill request has been completed: [] Yes [] No    If the office needs to give you a call back, can they leave a voicemail: [] Yes [] No    Eliz Bryant LPN  10/30/23, 09:17 EDT

## 2023-11-20 DIAGNOSIS — F41.8 SITUATIONAL ANXIETY: ICD-10-CM

## 2023-11-20 RX ORDER — ALPRAZOLAM 0.5 MG/1
0.5 TABLET ORAL 2 TIMES DAILY PRN
Qty: 20 TABLET | Refills: 0 | Status: SHIPPED | OUTPATIENT
Start: 2023-11-20

## 2023-11-21 ENCOUNTER — OFFICE VISIT (OUTPATIENT)
Dept: INTERNAL MEDICINE | Facility: CLINIC | Age: 88
End: 2023-11-21
Payer: MEDICARE

## 2023-11-21 VITALS
SYSTOLIC BLOOD PRESSURE: 142 MMHG | TEMPERATURE: 97.5 F | HEIGHT: 68 IN | DIASTOLIC BLOOD PRESSURE: 88 MMHG | HEART RATE: 72 BPM | BODY MASS INDEX: 30.22 KG/M2 | WEIGHT: 199.4 LBS

## 2023-11-21 DIAGNOSIS — F41.9 ANXIETY: Primary | ICD-10-CM

## 2023-11-21 PROCEDURE — 1159F MED LIST DOCD IN RCRD: CPT | Performed by: INTERNAL MEDICINE

## 2023-11-21 PROCEDURE — 99213 OFFICE O/P EST LOW 20 MIN: CPT | Performed by: INTERNAL MEDICINE

## 2023-11-21 PROCEDURE — 1160F RVW MEDS BY RX/DR IN RCRD: CPT | Performed by: INTERNAL MEDICINE

## 2023-11-21 NOTE — PROGRESS NOTES
[facility]     Agapito Downey  1935   [id number]      Patient Care Team:  Kendrick Oconnor MD as PCP - General  Federico Campbell MD as Consulting Physician (Cardiology)    Chief Complaint::   Chief Complaint   Patient presents with    Anxiety        HPI  The patient comes in with anxiety. He is accompanied by an adult female.    Anxiety  He suspects that Cardura aggravated his anxiety symptoms and he stopped the Cardura. He notes that Xanax is effective only for 6 hours. The adult female notes that the patient took Xanax once in the morning and once at night; but they noticed that he was having unsteadiness and slurring of the speech with the morning dose of Xanax. The patient stopped taking Xanax during the day and took it at night. He admits that when he wakes up at 4:00 AM, he seems to have tachypnea symptoms and feels anxious as well. The adult female reports that when she pats his back or touches him his tachypnea symptoms resolve.  He describes the anxiety symptoms during the day as he being unable to sit down, he is fatigued due to lack of sleep, and when he tries to sit on his recliner to take a nap, he is unable to sit long enough to fall asleep. He becomes restless and he has to get up and move. He walks up and down the hallway, down the driveway, will go outdoors and walk around the block. He notes that walking helps with his restless-type feeling. His TSH in 06/2023 was elevated at 5.4 uIU/mL. He reports that he experienced epistaxis after taking Eliquis and he has tapered down the dose to once a day. He is concerned about the side effects and drug interaction of Zoloft. The adult female notes that the patient is not sleeping enough because his appetite has decreased. He admits that he has lost 5 pounds. He notes that nothing tastes good to him.    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    Diverticulosis     Dyslipidemia     Hyperlipidemia     Hypertension     benign    Myocardial infarction 1994    Nephrolithiasis 09/20/2014    Osteoarthritis        Past Surgical History:   Procedure Laterality Date    CARDIAC ELECTROPHYSIOLOGY PROCEDURE N/A 12/4/2019    Procedure: Device Implant     PPM IMPLANT;  Surgeon: Federico Campbell MD;  Location: [facility] INVASIVE LOCATION;  Service: Cardiology    CHOLECYSTECTOMY  1995    laparoscopic    CORONARY ARTERY BYPASS GRAFT  11/1994    INSERTION PROSTATE RADIATION SEED  2006    Cancer, prostate; PSA has since been undetectable    OTHER SURGICAL HISTORY      Prostate surgery with seed implantation.    TONSILLECTOMY  1943       Family History   Problem Relation Age of Onset    Heart disease Other     Coronary artery disease Brother         cause of death    Heart disease Brother     Heart disease Father        Social History     Socioeconomic History    Marital status:    Tobacco Use    Smoking status: Never    Smokeless tobacco: Never   Substance and Sexual Activity    Alcohol use: No    Drug use: No    Sexual activity: Not Currently     Partners: Female       Allergies   Allergen Reactions    Reserpine Other (See Comments)     depression     Statins Other (See Comments)     Myalgias and mood affect disorder  fatigue           Current Outpatient Medications:     ALPRAZolam (XANAX) 0.5 MG tablet, TAKE ONE TABLET BY MOUTH TWICE DAILY AS NEEDED, Disp: 20 tablet, Rfl: 0    bisoprolol (ZEBeta) 5 MG tablet, Take 1 tablet by mouth Daily., Disp: , Rfl:     clindamycin (CLEOCIN T) 1 % external solution, Apply 1 application topically to the appropriate area as directed Daily As Needed., Disp: , Rfl:     D3-50 1.25 MG (30000 UT) capsule, TAKE 1 CAPSULE BY MOUTH ONCE A WEEK, Disp: 12 capsule, Rfl: 3    Eliquis 5 MG tablet tablet, Take 1 tablet by mouth Daily. Pt decreased dose due to nosebleeds, Disp: , Rfl:     Evolocumab (REPATHA) solution auto-injector SureClick injection, Inject 1 mL under the skin into the appropriate area as directed Every 14 (Fourteen) Days., Disp: , Rfl:     ketotifen (ZADITOR) 0.025 % ophthalmic solution, Apply 1 drop to eye(s) as directed by provider Daily As Needed., Disp: , Rfl:     levothyroxine (SYNTHROID, LEVOTHROID) 75 MCG tablet, Take 1 tablet by mouth Daily., Disp: 90 tablet, Rfl: 3    losartan (COZAAR) 50 MG tablet, Take 1 tablet by mouth Daily. (Patient taking differently: Take 1 tablet by mouth 2 (Two) Times a Day.), Disp: , Rfl:     magnesium oxide (MAG-OX) 400 MG tablet, Take 1 tablet by mouth Daily., Disp: , Rfl:     metFORMIN ER (GLUCOPHAGE-XR) 500 MG 24 hr tablet, TAKE ONE TABLET BY MOUTH ONE TIME DAILY, Disp: 90 tablet, Rfl: 1    Multiple Vitamins-Minerals (CENTRUM SILVER 50+MEN PO), Take 1 tablet by mouth Daily., Disp: , Rfl:     mupirocin (BACTROBAN) 2 % ointment, Apply 1 application  topically to the appropriate area as directed Daily., Disp: , Rfl:     Omega-3 Fatty Acids (fish oil) 1000 MG capsule capsule, Take 1 capsule by mouth Daily With Breakfast., Disp: , Rfl:     polyethyl glycol-propyl glycol (SYSTANE) 0.4-0.3 % solution ophthalmic solution, Apply 1 drop to eye(s) as directed by provider Daily As Needed., Disp:  ", Rfl:     Probiotic Product (ALIGN) 4 MG capsule, Take 1 capsule by mouth Daily., Disp: , Rfl:     psyllium (METAMUCIL) 100 % pack packet, Take 1 packet by mouth Daily., Disp: , Rfl:     spironolactone (ALDACTONE) 25 MG tablet, TAKE ONE TABLET BY MOUTH EVERY OTHER DAY, Disp: 45 tablet, Rfl: 5    ondansetron ODT (ZOFRAN-ODT) 4 MG disintegrating tablet, Place 1 tablet on the tongue Every 8 (Eight) Hours As Needed for Nausea or Vomiting., Disp: 15 tablet, Rfl: 0    QUEtiapine (SEROquel) 25 MG tablet, Take 1 tablet by mouth Every Night., Disp: 30 tablet, Rfl: 1    Review of Systems   Constitutional: Negative.    Respiratory: Negative.  Negative for chest tightness and shortness of breath.    Cardiovascular: Negative.  Negative for chest pain.   Gastrointestinal:  Negative for abdominal pain, blood in stool, constipation and diarrhea.        Vital Signs  Vitals:    11/21/23 1050   BP: 142/88   BP Location: Left arm   Patient Position: Sitting   Cuff Size: Adult   Pulse: 72   Temp: 97.5 °F (36.4 °C)   Weight: 90.4 kg (199 lb 6.4 oz)   Height: 172.7 cm (67.99\")   PainSc: 0-No pain       Physical Exam  Constitutional:       General: He is not in acute distress.     Appearance: Normal appearance.   HENT:      Head: Normocephalic and atraumatic.   Pulmonary:      Effort: Pulmonary effort is normal. No respiratory distress.   Neurological:      Mental Status: He is alert and oriented to person, place, and time.      Cranial Nerves: No cranial nerve deficit.   Psychiatric:         Mood and Affect: Mood normal.         Behavior: Behavior normal.          Procedures    ACE III MINI             Assessment/Plan:    Diagnoses and all orders for this visit:    1. Anxiety (Primary)    Other orders  -     Discontinue: sertraline (Zoloft) 50 MG tablet; Take 1 tablet by mouth Daily.  Dispense: 30 tablet; Refill: 5        Anxiety disorder  - He has many concerns about drug side effects. It is possible that Cardura triggered the anxiety " and then it took on the life of it's own. I would not argue that point, but now we are left with treating. He has had unacceptable side effects taking daytime alprazolam and that was never intended to be long term. It does help him sleep for a few hours at night. After a long discussion, he is agreeable to a trial of sertraline 50 mg once a day. If he has fatigue or queasiness in the first few days, he may cut it to a half. He may continue to use the alprazolam at bedtime, but the goal is eventually to get off that.    He will follow up in 12/2023 as scheduled.    Plan of care reviewed with patient at the conclusion of today's visit. Education was provided regarding diagnosis, management, and any prescribed or recommended OTC medications.Patient verbalizes understanding of and agreement with management plan.         Kendrick Oconnor MD     Transcribed from ambient dictation for Kendrick Oconnor MD by Patrick León.  11/21/23   15:24 EST    Patient or patient representative verbalized consent to the visit recording.  I have personally performed the services described in this document as transcribed by the above individual, and it is both accurate and complete.

## 2023-11-22 RX ORDER — QUETIAPINE FUMARATE 25 MG/1
25 TABLET, FILM COATED ORAL NIGHTLY
Qty: 30 TABLET | Refills: 1 | Status: SHIPPED | OUTPATIENT
Start: 2023-11-22

## 2023-11-25 ENCOUNTER — TELEPHONE (OUTPATIENT)
Dept: INTERNAL MEDICINE | Facility: CLINIC | Age: 88
End: 2023-11-25
Payer: MEDICARE

## 2023-11-25 RX ORDER — ONDANSETRON 4 MG/1
4 TABLET, ORALLY DISINTEGRATING ORAL EVERY 8 HOURS PRN
Qty: 15 TABLET | Refills: 0 | Status: SHIPPED | OUTPATIENT
Start: 2023-11-25

## 2023-11-27 ENCOUNTER — TELEPHONE (OUTPATIENT)
Dept: INTERNAL MEDICINE | Facility: CLINIC | Age: 88
End: 2023-11-27
Payer: MEDICARE

## 2023-11-27 RX ORDER — METHOCARBAMOL 750 MG/1
50000 TABLET ORAL WEEKLY
Qty: 12 CAPSULE | Refills: 0 | Status: SHIPPED | OUTPATIENT
Start: 2023-11-27

## 2023-11-27 RX ORDER — LEVOTHYROXINE SODIUM 0.07 MG/1
75 TABLET ORAL DAILY
Qty: 90 TABLET | Refills: 3 | Status: SHIPPED | OUTPATIENT
Start: 2023-11-27

## 2023-11-27 NOTE — TELEPHONE ENCOUNTER
"Regarding: status  Contact: [phone]  ----- Message from Montse Wells MA sent at 11/27/2023  9:08 AM EST -----       ----- Message from Agapito Downey \"Juan\" to Kendrick Oconnor MD sent at 11/27/2023  7:43 AM -----   Since i saw you last i've had five days of the most   miserable of my life. Can't eat. Can't sleep. Zoloft made me very ill. As i write this I am very sick to my stomach. Talked to Dr. Elias over the weekend. Where do we go from here.    "

## 2023-11-27 NOTE — TELEPHONE ENCOUNTER
Spoke with pt.  Experienced diarrhea and nausea after taking zoloft.  Last dose was the 23rd, no nausea today.  Last night took quetiapine, but didn't sleep well. Has ondansetron for nausea.  Suggested increasing quetiapine to 50 mg tonight, and touch base with me tomorrow.

## 2023-12-08 RX ORDER — OLANZAPINE 5 MG/1
5 TABLET ORAL NIGHTLY
Qty: 30 TABLET | Refills: 0 | Status: SHIPPED | OUTPATIENT
Start: 2023-12-08

## 2023-12-11 ENCOUNTER — OFFICE VISIT (OUTPATIENT)
Dept: INTERNAL MEDICINE | Facility: CLINIC | Age: 88
End: 2023-12-11
Payer: MEDICARE

## 2023-12-11 VITALS
DIASTOLIC BLOOD PRESSURE: 92 MMHG | BODY MASS INDEX: 30.16 KG/M2 | WEIGHT: 199 LBS | TEMPERATURE: 97.3 F | SYSTOLIC BLOOD PRESSURE: 156 MMHG | HEIGHT: 68 IN | HEART RATE: 80 BPM

## 2023-12-11 DIAGNOSIS — F41.9 ANXIETY: Primary | ICD-10-CM

## 2023-12-11 PROCEDURE — 1159F MED LIST DOCD IN RCRD: CPT | Performed by: INTERNAL MEDICINE

## 2023-12-11 PROCEDURE — 99213 OFFICE O/P EST LOW 20 MIN: CPT | Performed by: INTERNAL MEDICINE

## 2023-12-11 PROCEDURE — 1160F RVW MEDS BY RX/DR IN RCRD: CPT | Performed by: INTERNAL MEDICINE

## 2023-12-11 RX ORDER — OMEPRAZOLE 20 MG/1
20 CAPSULE, DELAYED RELEASE ORAL DAILY
COMMUNITY

## 2023-12-11 NOTE — PROGRESS NOTES
[facility]     Agapito Downey  1935   [id number]      Patient Care Team:  Kendrick Oconnor MD as PCP - General  Federico Campbell MD as Consulting Physician (Cardiology)    Chief Complaint::   Chief Complaint   Patient presents with    Medication Problem     Concerns about Zyprexa        HPI  The patient comes in complaining about anxiety. He is accompanied by an adult female.    Anxiety at night with persistent insomnia  The patient does not sleep at night and got 2 hours of sleep last night. His biggest problem is no sleep during the day. His anxiety seems to be somewhat reduced, but when he lays down, his anxiety seems to come up and he cannot fall asleep before the anxiety kicks in. He thinks that is what keeps him awake.    Dry mouth  The patient still has dry mouth, but it does not seem quite as bad. His meals consists with soups, gravies, and soups. He still has meat, but he cuts it up real fine.    GERD  The omeprazole seems to be working pretty good. He does not have the pressure in the urine.    Bilateral knee pain  The patient has a problem with both knees. He had a lot of trouble earlier because he was on Eliquis and  every time he went back on Eliquis, he gets a nosebleed. He went to an ENT and he cauterized it. He went back on Eliquis and a few days he had another one. He got back to the ENT people and he wanted another appointment about 3.5 weeks. He cannot go back on Eliquis as long as it is leaking constantly He was given Eliquis for atrial fibrillation. He was told that he had atrial fibrillation 1.5 years ago. He was told that he had atrial fibrillation 2 percent of the time and then in the next 6 months there was no atrial fibrillation at all. He was still given the full dose of Eliquis and is a little puzzled by that. He thinks he could go without Eliquis. He has an appointment next week. He is trying to repair himself on Eliquis. The last time he had a nosebleed,  he notified him that he was told to go off Eliquis for 4 days. He  went off Eliquis for 4 days and when he went back on Eliquis, sometimes it works and stops and heals up. He does not have a problem.    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    Diverticulosis     Dyslipidemia     Hyperlipidemia     Hypertension     benign    Myocardial infarction 1994    Nephrolithiasis 09/20/2014    Osteoarthritis        Past Surgical History:   Procedure Laterality Date    CARDIAC ELECTROPHYSIOLOGY PROCEDURE N/A 12/4/2019    Procedure: Device Implant     PPM IMPLANT;  Surgeon: Federico Campbell MD;  Location: [facility] CATH INVASIVE LOCATION;  Service: Cardiology    CHOLECYSTECTOMY  1995    laparoscopic    CORONARY ARTERY BYPASS GRAFT  11/1994    INSERTION PROSTATE RADIATION SEED  2006    Cancer, prostate; PSA has since been undetectable    OTHER SURGICAL HISTORY      Prostate surgery with seed implantation.    TONSILLECTOMY  1943       Family History   Problem Relation Age of Onset    Heart disease Other     Coronary artery disease Brother         cause of death    Heart disease Brother     Heart disease Father        Social History      Socioeconomic History    Marital status:    Tobacco Use    Smoking status: Never    Smokeless tobacco: Never   Substance and Sexual Activity    Alcohol use: No    Drug use: No    Sexual activity: Not Currently     Partners: Female       Allergies   Allergen Reactions    Reserpine Other (See Comments)     depression    Statins Other (See Comments)     Myalgias and mood affect disorder  fatigue           Current Outpatient Medications:     ALPRAZolam (XANAX) 0.5 MG tablet, TAKE ONE TABLET BY MOUTH TWICE DAILY AS NEEDED, Disp: 20 tablet, Rfl: 0    bisoprolol (ZEBeta) 5 MG tablet, Take 1 tablet by mouth Daily., Disp: , Rfl:     clindamycin (CLEOCIN T) 1 % external solution, Apply 1 application topically to the appropriate area as directed Daily As Needed., Disp: , Rfl:     D3-50 1.25 MG (88543 UT) capsule, TAKE 1 CAPSULE BY MOUTH ONCE WEEKLY, Disp: 12 capsule, Rfl: 0    Eliquis 5 MG tablet tablet, Take 1 tablet by mouth Daily. Pt decreased dose due to nosebleeds, Disp: , Rfl:     Evolocumab (REPATHA) solution auto-injector SureClick injection, Inject 1 mL under the skin into the appropriate area as directed Every 14 (Fourteen) Days., Disp: , Rfl:     ketotifen (ZADITOR) 0.025 % ophthalmic solution, Apply 1 drop to eye(s) as directed by provider Daily As Needed., Disp: , Rfl:     levothyroxine (SYNTHROID, LEVOTHROID) 75 MCG tablet, TAKE ONE TABLET BY MOUTH ONE TIME DAILY, Disp: 90 tablet, Rfl: 3    losartan (COZAAR) 50 MG tablet, Take 1 tablet by mouth Daily. (Patient taking differently: Take 1 tablet by mouth 2 (Two) Times a Day.), Disp: , Rfl:     magnesium oxide (MAG-OX) 400 MG tablet, Take 1 tablet by mouth Daily., Disp: , Rfl:     metFORMIN ER (GLUCOPHAGE-XR) 500 MG 24 hr tablet, TAKE ONE TABLET BY MOUTH ONE TIME DAILY, Disp: 90 tablet, Rfl: 1    Multiple Vitamins-Minerals (CENTRUM SILVER 50+MEN PO), Take 1 tablet by mouth Daily., Disp: , Rfl:     mupirocin (BACTROBAN) 2 % ointment, Apply 1 application   "topically to the appropriate area as directed Daily., Disp: , Rfl:     OLANZapine (ZyPREXA) 5 MG tablet, Take 1 tablet by mouth Every Night., Disp: 30 tablet, Rfl: 0    omeprazole (priLOSEC) 20 MG capsule, Take 1 capsule by mouth Daily., Disp: , Rfl:     ondansetron ODT (ZOFRAN-ODT) 4 MG disintegrating tablet, Place 1 tablet on the tongue Every 8 (Eight) Hours As Needed for Nausea or Vomiting., Disp: 15 tablet, Rfl: 0    polyethyl glycol-propyl glycol (SYSTANE) 0.4-0.3 % solution ophthalmic solution, Apply 1 drop to eye(s) as directed by provider Daily As Needed., Disp: , Rfl:     Probiotic Product (ALIGN) 4 MG capsule, Take 1 capsule by mouth Daily., Disp: , Rfl:     psyllium (METAMUCIL) 100 % pack packet, Take 1 packet by mouth Daily., Disp: , Rfl:     spironolactone (ALDACTONE) 25 MG tablet, TAKE ONE TABLET BY MOUTH EVERY OTHER DAY, Disp: 45 tablet, Rfl: 5    Review of Systems is otherwise unremarkable.    Vital Signs  Vitals:    12/11/23 1510   BP: 156/92   BP Location: Left arm   Patient Position: Sitting   Cuff Size: Adult   Pulse: 80   Temp: 97.3 °F (36.3 °C)   Weight: 90.3 kg (199 lb)   Height: 172.7 cm (67.99\")   PainSc: 0-No pain       Physical Exam  Constitutional:       General: He is not in acute distress.     Appearance: Normal appearance.   HENT:      Head: Normocephalic and atraumatic.   Pulmonary:      Effort: Pulmonary effort is normal. No respiratory distress.   Neurological:      Mental Status: He is alert and oriented to person, place, and time.      Cranial Nerves: No cranial nerve deficit.   Psychiatric:         Mood and Affect: Mood normal.         Behavior: Behavior normal.          Procedures    ACE III MINI             Assessment/Plan:    Diagnoses and all orders for this visit:    1. Anxiety (Primary)    1. Anxiety at night with persistent insomnia  - He did not respond to trazodone or alprazolam. So far, 25 mg of quetiapine has not helped and has caused a dry mouth. Initially, he tried " to push the dose of quetiapine and had some incontinence. However, now that he has been on the drug for over a week, suggested he again increase to 50 mg tonight. If this is not effective, he has Zyprexa. We discussed the possibility of cognitive behavioral therapy as a potential treatment and particularly if we are unsuccessful with yet another drug.      Plan of care reviewed with patient at the conclusion of today's visit. Education was provided regarding diagnosis, management, and any prescribed or recommended OTC medications.Patient verbalizes understanding of and agreement with management plan.         Kendrick Oconnor MD       Transcribed from ambient dictation for Kendrick Oconnor MD by Ruthann Minor.  12/12/23   09:17 EST    Patient or patient representative verbalized consent to the visit recording.  I have personally performed the services described in this document as transcribed by the above individual, and it is both accurate and complete.

## 2023-12-14 ENCOUNTER — TELEPHONE (OUTPATIENT)
Dept: INTERNAL MEDICINE | Facility: CLINIC | Age: 88
End: 2023-12-14
Payer: MEDICARE

## 2023-12-14 DIAGNOSIS — E03.9 ACQUIRED HYPOTHYROIDISM: ICD-10-CM

## 2023-12-14 DIAGNOSIS — E55.9 VITAMIN D DEFICIENCY: ICD-10-CM

## 2023-12-14 DIAGNOSIS — E78.5 DYSLIPIDEMIA: Primary | ICD-10-CM

## 2023-12-14 DIAGNOSIS — E11.42 DIABETIC POLYNEUROPATHY ASSOCIATED WITH TYPE 2 DIABETES MELLITUS: ICD-10-CM

## 2023-12-14 NOTE — TELEPHONE ENCOUNTER
"    Caller: Agapito Downey \"Juan\"    Relationship: Self    Best call back number: [phone]    What orders are you requesting (i.e. lab or imaging): BLOOD WORK ORDERS FOR NEXT APPOINTMENT 12/20/2023    Additional notes: PLEASE CALL PATIENT TO LET HIM KNOW IF THAT CAN BE ORDERED           "

## 2023-12-18 ENCOUNTER — LAB (OUTPATIENT)
Dept: LAB | Facility: HOSPITAL | Age: 88
End: 2023-12-18
Payer: MEDICARE

## 2023-12-18 DIAGNOSIS — E11.42 DIABETIC POLYNEUROPATHY ASSOCIATED WITH TYPE 2 DIABETES MELLITUS: ICD-10-CM

## 2023-12-18 DIAGNOSIS — E55.9 VITAMIN D DEFICIENCY: ICD-10-CM

## 2023-12-18 DIAGNOSIS — E78.5 DYSLIPIDEMIA: ICD-10-CM

## 2023-12-18 DIAGNOSIS — E03.9 ACQUIRED HYPOTHYROIDISM: ICD-10-CM

## 2023-12-19 LAB
25(OH)D3+25(OH)D2 SERPL-MCNC: 105 NG/ML (ref 30–100)
ALBUMIN SERPL-MCNC: 4.3 G/DL (ref 3.5–5.2)
ALBUMIN/GLOB SERPL: 1.7 G/DL
ALP SERPL-CCNC: 54 U/L (ref 39–117)
ALT SERPL-CCNC: 28 U/L (ref 1–41)
AST SERPL-CCNC: 19 U/L (ref 1–40)
BILIRUB SERPL-MCNC: 0.9 MG/DL (ref 0–1.2)
BUN SERPL-MCNC: 23 MG/DL (ref 8–23)
BUN/CREAT SERPL: 15.6 (ref 7–25)
CALCIUM SERPL-MCNC: 9.5 MG/DL (ref 8.6–10.5)
CHLORIDE SERPL-SCNC: 105 MMOL/L (ref 98–107)
CHOLEST SERPL-MCNC: 79 MG/DL (ref 0–200)
CO2 SERPL-SCNC: 25.2 MMOL/L (ref 22–29)
CREAT SERPL-MCNC: 1.47 MG/DL (ref 0.76–1.27)
EGFRCR SERPLBLD CKD-EPI 2021: 46.2 ML/MIN/1.73
GLOBULIN SER CALC-MCNC: 2.5 GM/DL
GLUCOSE SERPL-MCNC: 148 MG/DL (ref 65–99)
HBA1C MFR BLD: 6.2 % (ref 4.8–5.6)
HDLC SERPL-MCNC: 45 MG/DL (ref 40–60)
LDLC SERPL CALC-MCNC: 17 MG/DL (ref 0–100)
POTASSIUM SERPL-SCNC: 4.8 MMOL/L (ref 3.5–5.2)
PROT SERPL-MCNC: 6.8 G/DL (ref 6–8.5)
SODIUM SERPL-SCNC: 143 MMOL/L (ref 136–145)
TRIGL SERPL-MCNC: 86 MG/DL (ref 0–150)
TSH SERPL DL<=0.005 MIU/L-ACNC: 9.15 UIU/ML (ref 0.27–4.2)
VLDLC SERPL CALC-MCNC: 17 MG/DL (ref 5–40)

## 2023-12-20 ENCOUNTER — TELEPHONE (OUTPATIENT)
Dept: INTERNAL MEDICINE | Facility: CLINIC | Age: 88
End: 2023-12-20

## 2023-12-20 ENCOUNTER — APPOINTMENT (OUTPATIENT)
Dept: GENERAL RADIOLOGY | Facility: HOSPITAL | Age: 88
End: 2023-12-20
Payer: MEDICARE

## 2023-12-20 ENCOUNTER — HOSPITAL ENCOUNTER (EMERGENCY)
Facility: HOSPITAL | Age: 88
Discharge: HOME | End: 2023-12-20
Attending: EMERGENCY MEDICINE | Admitting: EMERGENCY MEDICINE
Payer: MEDICARE

## 2023-12-20 VITALS
TEMPERATURE: 96.8 F | BODY MASS INDEX: 31.23 KG/M2 | WEIGHT: 199 LBS | OXYGEN SATURATION: 91 % | RESPIRATION RATE: 18 BRPM | HEIGHT: 67 IN | HEART RATE: 64 BPM | SYSTOLIC BLOOD PRESSURE: 129 MMHG | DIASTOLIC BLOOD PRESSURE: 78 MMHG

## 2023-12-20 DIAGNOSIS — I25.810 CORONARY ARTERY DISEASE INVOLVING CORONARY BYPASS GRAFT OF NATIVE HEART WITHOUT ANGINA PECTORIS: Primary | Chronic | ICD-10-CM

## 2023-12-20 DIAGNOSIS — J96.01 ACUTE RESPIRATORY FAILURE WITH HYPOXIA: ICD-10-CM

## 2023-12-20 DIAGNOSIS — I50.33 ACUTE ON CHRONIC DIASTOLIC HEART FAILURE: Primary | ICD-10-CM

## 2023-12-20 LAB
ALBUMIN SERPL-MCNC: 3.9 G/DL (ref 3.5–5.2)
ALBUMIN/GLOB SERPL: 1.3 G/DL
ALP SERPL-CCNC: 54 U/L (ref 39–117)
ALT SERPL W P-5'-P-CCNC: 31 U/L (ref 1–41)
ANION GAP SERPL CALCULATED.3IONS-SCNC: 12 MMOL/L (ref 5–15)
AST SERPL-CCNC: 35 U/L (ref 1–40)
BASOPHILS # BLD AUTO: 0.03 10*3/MM3 (ref 0–0.2)
BASOPHILS NFR BLD AUTO: 0.3 % (ref 0–1.5)
BILIRUB SERPL-MCNC: 1.5 MG/DL (ref 0–1.2)
BUN SERPL-MCNC: 29 MG/DL (ref 8–23)
BUN/CREAT SERPL: 21.8 (ref 7–25)
CALCIUM SPEC-SCNC: 9.7 MG/DL (ref 8.6–10.5)
CHLORIDE SERPL-SCNC: 103 MMOL/L (ref 98–107)
CO2 SERPL-SCNC: 22 MMOL/L (ref 22–29)
CREAT SERPL-MCNC: 1.33 MG/DL (ref 0.76–1.27)
DEPRECATED RDW RBC AUTO: 50 FL (ref 37–54)
EGFRCR SERPLBLD CKD-EPI 2021: 52.1 ML/MIN/1.73
EOSINOPHIL # BLD AUTO: 0.02 10*3/MM3 (ref 0–0.4)
EOSINOPHIL NFR BLD AUTO: 0.2 % (ref 0.3–6.2)
ERYTHROCYTE [DISTWIDTH] IN BLOOD BY AUTOMATED COUNT: 13.7 % (ref 12.3–15.4)
FLUAV RNA RESP QL NAA+PROBE: NOT DETECTED
FLUBV RNA RESP QL NAA+PROBE: NOT DETECTED
GEN 5 2HR TROPONIN T REFLEX: 24 NG/L
GLOBULIN UR ELPH-MCNC: 3.1 GM/DL
GLUCOSE SERPL-MCNC: 192 MG/DL (ref 65–99)
HCT VFR BLD AUTO: 48.4 % (ref 37.5–51)
HGB BLD-MCNC: 15.3 G/DL (ref 13–17.7)
HOLD SPECIMEN: NORMAL
IMM GRANULOCYTES # BLD AUTO: 0.05 10*3/MM3 (ref 0–0.05)
IMM GRANULOCYTES NFR BLD AUTO: 0.5 % (ref 0–0.5)
LYMPHOCYTES # BLD AUTO: 0.98 10*3/MM3 (ref 0.7–3.1)
LYMPHOCYTES NFR BLD AUTO: 10 % (ref 19.6–45.3)
MCH RBC QN AUTO: 31.2 PG (ref 26.6–33)
MCHC RBC AUTO-ENTMCNC: 31.6 G/DL (ref 31.5–35.7)
MCV RBC AUTO: 98.6 FL (ref 79–97)
MONOCYTES # BLD AUTO: 0.83 10*3/MM3 (ref 0.1–0.9)
MONOCYTES NFR BLD AUTO: 8.5 % (ref 5–12)
NEUTROPHILS NFR BLD AUTO: 7.85 10*3/MM3 (ref 1.7–7)
NEUTROPHILS NFR BLD AUTO: 80.5 % (ref 42.7–76)
NRBC BLD AUTO-RTO: 0 /100 WBC (ref 0–0.2)
NT-PROBNP SERPL-MCNC: ABNORMAL PG/ML (ref 0–1800)
PLATELET # BLD AUTO: 291 10*3/MM3 (ref 140–450)
PMV BLD AUTO: 9.8 FL (ref 6–12)
POTASSIUM SERPL-SCNC: 5.1 MMOL/L (ref 3.5–5.2)
PROT SERPL-MCNC: 7 G/DL (ref 6–8.5)
RBC # BLD AUTO: 4.91 10*6/MM3 (ref 4.14–5.8)
RSV RNA NPH QL NAA+NON-PROBE: NOT DETECTED
SARS-COV-2 RNA RESP QL NAA+PROBE: NOT DETECTED
SODIUM SERPL-SCNC: 137 MMOL/L (ref 136–145)
TROPONIN T DELTA: 0 NG/L
TROPONIN T SERPL HS-MCNC: 24 NG/L
WBC NRBC COR # BLD AUTO: 9.76 10*3/MM3 (ref 3.4–10.8)
WHOLE BLOOD HOLD COAG: NORMAL
WHOLE BLOOD HOLD SPECIMEN: NORMAL

## 2023-12-20 PROCEDURE — 96374 THER/PROPH/DIAG INJ IV PUSH: CPT

## 2023-12-20 PROCEDURE — 80053 COMPREHEN METABOLIC PANEL: CPT

## 2023-12-20 PROCEDURE — 85025 COMPLETE CBC W/AUTO DIFF WBC: CPT

## 2023-12-20 PROCEDURE — 87637 SARSCOV2&INF A&B&RSV AMP PRB: CPT | Performed by: EMERGENCY MEDICINE

## 2023-12-20 PROCEDURE — 84484 ASSAY OF TROPONIN QUANT: CPT | Performed by: EMERGENCY MEDICINE

## 2023-12-20 PROCEDURE — 83880 ASSAY OF NATRIURETIC PEPTIDE: CPT

## 2023-12-20 PROCEDURE — 99284 EMERGENCY DEPT VISIT MOD MDM: CPT

## 2023-12-20 PROCEDURE — 93005 ELECTROCARDIOGRAM TRACING: CPT

## 2023-12-20 PROCEDURE — 71045 X-RAY EXAM CHEST 1 VIEW: CPT

## 2023-12-20 PROCEDURE — 36415 COLL VENOUS BLD VENIPUNCTURE: CPT

## 2023-12-20 PROCEDURE — 25010000002 FUROSEMIDE PER 20 MG: Performed by: EMERGENCY MEDICINE

## 2023-12-20 RX ORDER — FUROSEMIDE 10 MG/ML
80 INJECTION INTRAMUSCULAR; INTRAVENOUS ONCE
Status: COMPLETED | OUTPATIENT
Start: 2023-12-20 | End: 2023-12-20

## 2023-12-20 RX ORDER — FUROSEMIDE 40 MG/1
40 TABLET ORAL 2 TIMES DAILY
Qty: 30 TABLET | Refills: 0 | Status: SHIPPED | OUTPATIENT
Start: 2023-12-20 | End: 2023-12-29 | Stop reason: SDUPTHER

## 2023-12-20 RX ORDER — POTASSIUM CHLORIDE 750 MG/1
10 TABLET, EXTENDED RELEASE ORAL DAILY
Qty: 30 TABLET | Refills: 1 | Status: SHIPPED | OUTPATIENT
Start: 2023-12-20 | End: 2023-12-29 | Stop reason: SDUPTHER

## 2023-12-20 RX ORDER — SODIUM CHLORIDE 0.9 % (FLUSH) 0.9 %
10 SYRINGE (ML) INJECTION AS NEEDED
Status: DISCONTINUED | OUTPATIENT
Start: 2023-12-20 | End: 2023-12-20 | Stop reason: HOSPADM

## 2023-12-20 RX ADMIN — FUROSEMIDE 80 MG: 10 INJECTION, SOLUTION INTRAMUSCULAR; INTRAVENOUS at 09:21

## 2023-12-20 NOTE — TELEPHONE ENCOUNTER
I sent in potassium but he needs to have his potassium checked next week.  I will be out of town, so if he's not going to see Dr Campbell he needs to see someone in this office.  This is important because he is on spironolactone, which can raise potassium.

## 2023-12-20 NOTE — TELEPHONE ENCOUNTER
Spoke to Bryce and he said pt will not have ins coverage until Jan. He does not want pt to see Dr. Campbell anymore. Could he be set up with a cardiologist outside [facility] for echo? Bryce would like to discuss options with Dr. Oconnor tomorrow  [phone]

## 2023-12-20 NOTE — ED PROVIDER NOTES
Subjective   History of Present Illness  Mr. Downey presents with several days of worsening shortness of breath.  He denies cough.  He notes new onset edema of his legs in the last couple of days.  He has been sleeping in a chair upright for the last couple of days.  He reports his breathing is worse when he lays flat.  He denies history of sleep apnea or congestive heart failure.  He denies fevers or chills.  He denies runny nose.  He tells me he has been on several different medicines over the last month for anxiety and to help him sleep.  This includes Zoloft, trazodone, and most recently olanzapine.      Review of Systems    Past Medical History:   Diagnosis Date    Anxiety Aug 2023    AV bloc first degree     CAD (coronary artery disease)     Cancer     prostate cancer seed implantion; Brachytherapy 2006, PSA has since been undetectable    Cholelithiasis ?    Diverticulosis     Dyslipidemia     Hyperlipidemia     Hypertension     benign    Myocardial infarction 1994    Nephrolithiasis 09/20/2014    Osteoarthritis        Allergies   Allergen Reactions    Reserpine Other (See Comments)     depression    Statins Other (See Comments)     Myalgias and mood affect disorder  fatigue         Past Surgical History:   Procedure Laterality Date    CARDIAC ELECTROPHYSIOLOGY PROCEDURE N/A 12/4/2019    Procedure: Device Implant     PPM IMPLANT;  Surgeon: Federico Campbell MD;  Location: [facility] CATH INVASIVE LOCATION;  Service: Cardiology    CHOLECYSTECTOMY  1995    laparoscopic    CORONARY ARTERY BYPASS GRAFT  11/1994    INSERTION PROSTATE RADIATION SEED  2006    Cancer, prostate; PSA has since been undetectable    OTHER SURGICAL HISTORY      Prostate surgery with seed implantation.    TONSILLECTOMY  1943       Family History   Problem Relation Age of Onset    Heart disease Other     Coronary artery disease Brother         cause of death    Heart disease Brother     Heart disease Father        Social History      Socioeconomic History    Marital status:    Tobacco Use    Smoking status: Never    Smokeless tobacco: Never   Substance and Sexual Activity    Alcohol use: No    Drug use: No    Sexual activity: Not Currently     Partners: Female           Objective   Physical Exam  Vitals and nursing note reviewed.   Constitutional:       General: He is not in acute distress.     Appearance: Normal appearance.   HENT:      Head: Normocephalic and atraumatic.      Nose: Nose normal. No congestion or rhinorrhea.   Eyes:      General: No scleral icterus.     Conjunctiva/sclera: Conjunctivae normal.   Neck:      Comments: JVD is present  Cardiovascular:      Rate and Rhythm: Normal rate and regular rhythm.      Heart sounds: No murmur heard.     No friction rub.   Pulmonary:      Effort: Pulmonary effort is normal.      Breath sounds: Rales present. No wheezing.   Abdominal:      General: Bowel sounds are normal.      Palpations: Abdomen is soft.      Tenderness: There is no abdominal tenderness. There is no guarding or rebound.   Musculoskeletal:         General: No tenderness.      Cervical back: Normal range of motion and neck supple.      Right lower leg: Edema present.      Left lower leg: Edema present.   Skin:     General: Skin is warm and dry.      Coloration: Skin is not pale.      Findings: No erythema.   Neurological:      General: No focal deficit present.      Mental Status: He is alert and oriented to person, place, and time.      Motor: No weakness.      Coordination: Coordination normal.   Psychiatric:         Mood and Affect: Mood is anxious.         Behavior: Behavior normal.         Thought Content: Thought content normal.         Procedures           ED Course  ED Course as of 12/20/23 1516   Wed Dec 20, 2023   0839 I reviewed his records.  Last echo was August 2017.  At that point he was noted to have good systolic function but concentric LVH with mild diastolic dysfunction.  Exam consistent with acute  CHF. [DT]   1022 He has had over a liter of urinary output and tells me he feels better.  I spoke with him and his son about findings.  He tells me his cardiologist is Dr. Campbell.  Will discuss with him.  Mr. Alberts tells me he prefers to not be admitted if possible.  Currently saturations on 2 L are 93% Lasix has been on board for an hour [DT]   1035 Spoke with Dr. Campbell who is familiar with Mr. Downey.  He recommends observing him for a couple of hours further in the emergency department to see if he improves, would prefer to keep him out of the hospital if possible. [DT]   1218 He tells me he feels much better and his shortness of breath is gone.  I turned off his oxygen and talk to them for a few minutes.  So far he is maintaining it 93%.  I laid him flat and will observe him flat for a little while [DT]   1248 Saturations 92% lying flat on room air.  He again tells me he feels fine.  Second troponin is unchanged and commensurate with his reduced GFR. [DT]   1252 D/w Dr Campbell, recommends 40mg lasix/d.  [DT]      ED Course User Index  [DT] Cassius eBltran MD                                             Medical Decision Making  Please see course notes.  I reviewed and interpreted prior records as reflected in HPI.  I gave IV Lasix and had multiple consultations with his cardiologist.  I observed him in the emergency department for several hours and diuresed him with IV Lasix.  Initially he required oxygen but was able to come off the oxygen and be discharged    Problems Addressed:  Acute on chronic diastolic heart failure: complicated acute illness or injury that poses a threat to life or bodily functions  Acute respiratory failure with hypoxia: complicated acute illness or injury that poses a threat to life or bodily functions    Amount and/or Complexity of Data Reviewed  Labs: ordered. Decision-making details documented in ED Course.  ECG/medicine tests: ordered. Decision-making details documented in  ED Course.    Risk  Prescription drug management.        Final diagnoses:   Acute respiratory failure with hypoxia   Acute on chronic diastolic heart failure       ED Disposition  ED Disposition       ED Disposition   Discharge    Condition   Stable    Comment   --               [facility] CARDIOLOGY  [address]  [address]  [facility] [address] [phone]             Medication List        New Prescriptions      furosemide 40 MG tablet  Commonly known as: LASIX  Take 1 tablet by mouth 2 (Two) Times a Day.            Changed      losartan 50 MG tablet  Commonly known as: COZAAR  Take 1 tablet by mouth Daily.  What changed: when to take this               Where to Get Your Medications        These medications were sent to [id number] - [place], [place] - [address] - [phone]  - [phone]   [address] [address]      Phone: [phone]   furosemide 40 MG tablet            Cassius Beltran MD  12/20/23 3625

## 2023-12-20 NOTE — TELEPHONE ENCOUNTER
12/26 is fine, that's less than a week. I will go ahead and order the potassium and he can get it done at the lab when he comes in next week.

## 2023-12-20 NOTE — TELEPHONE ENCOUNTER
Caller: DOROTHEALEO    Relationship: Emergency Contact    Best call back number: [phone]     What medication are you requesting: NEEDS PRESCRIPTION OR OTC POTASSIUM.    What are your current symptoms: CHF    Have you had these symptoms before:    [] Yes  [] No    Have you been treated for these symptoms before:   [] Yes  [] No    If a prescription is needed, what is your preferred pharmacy and phone number: [id number] - [place], [place] - [address] - [phone] [facility] [phone] FX     Additional notes:ALEYDA WENT TO [facility] ER 12/20/23 AND PRESCRIBED FUROSEMIDE. LEO WANT TO KNOW IF THERE IS ADDITIONAL POTASSIUM ALEYDA CAN TAKE.

## 2023-12-20 NOTE — DISCHARGE INSTRUCTIONS
Return if worsening of your breathing or any other concerns.  Someone from the heart failure clinic will call you and arrange follow-up.  I would also like you to follow-up with .  Call him to arrange follow-up as well.  I would recommend you talk to your primary care doctor about getting tested for sleep apnea.

## 2023-12-20 NOTE — TELEPHONE ENCOUNTER
Bryce called back, stated they will not be seeing Dr. Campbell. His father does have a new patient appointment with Mario Alberto Gonzalez in Cardiology on 12/26/23. Can the potassium check wait until then or does he need it be checked before.    He is concerned about bringing his father in for an office visit due to his [employer] PPO insurance, he does not want to incur financial bills due to insurance.  Can the order be entered without a visit? How should he proceed from here.

## 2023-12-20 NOTE — TELEPHONE ENCOUNTER
Hub staff attempted to follow warm transfer process and was unsuccessful     Caller: LEO PIEDRA    Relationship to patient: Emergency Contact    Best call back number: [phone]    Patient is needing: PATIENT NEEDING TO SCHEDULE HOSPITAL FOLLOW UP.    TCM, [facility], 12/20/23, CONGESTIVE HEART FAILURE

## 2023-12-21 LAB
QT INTERVAL: 466 MS
QTC INTERVAL: 495 MS

## 2023-12-26 ENCOUNTER — LAB (OUTPATIENT)
Dept: LAB | Facility: HOSPITAL | Age: 88
End: 2023-12-26
Payer: MEDICARE

## 2023-12-26 ENCOUNTER — OFFICE VISIT (OUTPATIENT)
Dept: CARDIOLOGY | Facility: HOSPITAL | Age: 88
End: 2023-12-26
Payer: MEDICARE

## 2023-12-26 VITALS
SYSTOLIC BLOOD PRESSURE: 136 MMHG | RESPIRATION RATE: 16 BRPM | OXYGEN SATURATION: 95 % | TEMPERATURE: 97.5 F | HEIGHT: 68 IN | HEART RATE: 61 BPM | BODY MASS INDEX: 28.69 KG/M2 | WEIGHT: 189.31 LBS | DIASTOLIC BLOOD PRESSURE: 84 MMHG

## 2023-12-26 DIAGNOSIS — I50.31 ACUTE DIASTOLIC CHF (CONGESTIVE HEART FAILURE): Primary | ICD-10-CM

## 2023-12-26 DIAGNOSIS — R29.818 SUSPECTED SLEEP APNEA: ICD-10-CM

## 2023-12-26 DIAGNOSIS — I10 ESSENTIAL HYPERTENSION: Chronic | ICD-10-CM

## 2023-12-26 DIAGNOSIS — I25.810 CORONARY ARTERY DISEASE INVOLVING CORONARY BYPASS GRAFT OF NATIVE HEART WITHOUT ANGINA PECTORIS: Chronic | ICD-10-CM

## 2023-12-26 DIAGNOSIS — I50.31 ACUTE DIASTOLIC CHF (CONGESTIVE HEART FAILURE): ICD-10-CM

## 2023-12-26 DIAGNOSIS — I49.5 SSS (SICK SINUS SYNDROME): ICD-10-CM

## 2023-12-26 LAB
ANION GAP SERPL CALCULATED.3IONS-SCNC: 12.8 MMOL/L (ref 5–15)
BUN SERPL-MCNC: 19 MG/DL (ref 8–23)
BUN/CREAT SERPL: 15.6 (ref 7–25)
CALCIUM SPEC-SCNC: 9.9 MG/DL (ref 8.6–10.5)
CHLORIDE SERPL-SCNC: 100 MMOL/L (ref 98–107)
CO2 SERPL-SCNC: 30.2 MMOL/L (ref 22–29)
CREAT SERPL-MCNC: 1.22 MG/DL (ref 0.76–1.27)
EGFRCR SERPLBLD CKD-EPI 2021: 57.7 ML/MIN/1.73
GLUCOSE SERPL-MCNC: 102 MG/DL (ref 65–99)
NT-PROBNP SERPL-MCNC: 7688 PG/ML (ref 0–1800)
POTASSIUM SERPL-SCNC: 3.8 MMOL/L (ref 3.5–5.2)
SODIUM SERPL-SCNC: 143 MMOL/L (ref 136–145)

## 2023-12-26 PROCEDURE — 80048 BASIC METABOLIC PNL TOTAL CA: CPT

## 2023-12-26 PROCEDURE — 83880 ASSAY OF NATRIURETIC PEPTIDE: CPT

## 2023-12-26 PROCEDURE — 36415 COLL VENOUS BLD VENIPUNCTURE: CPT

## 2023-12-26 NOTE — PATIENT INSTRUCTIONS
- Lab work on 7th floor    - Office will call to schedule testing  - Office will call or send message with testing results    - Cardiology and sleep medicine will call to schedule an appointment

## 2023-12-26 NOTE — PROGRESS NOTES
"Chief Complaint  Congestive Heart Failure and Establish Care    Subjective      History of Present Illness {CC  Problem List  Visit  Diagnosis   Encounters  Notes  Medications  Labs  Result Review Imaging  Media :23}     Agapito Downey, 88 y.o. male with CAD, SSS s/p dual-chamber PPM ([facility]), history of MI, HTN, HLD, remote prostate Ca presents to [facility] for Congestive Heart Failure and Establish Care. Primary cardiologist is Dr. Campbell.    Patient was recently evaluated at [facility] emergency department for complaints of shortness of breath.  Emergency department work-up revealed unchanged troponin delta. BNP elevated at 15,013. CXR with small bilateral pleural effusions, and ECGs with AV paced rhtyhm. Patient was instructed to follow-up at [facility] heart and valve clinic for further evaluation. ED documentation notes they discussed plan with Dr. Campbell and prescribed 40mg furosemide at discharge.     Patient presents today feeling improvement in respiratory status/LE edema since ED evaluation. Reports onset of increased LE edema and some orthopnea/anxiety approximately 10 days ago. Since initiation of furosemide shortness of breath has essentially resolved; no dyspnea when walking around living areas at [place] yesterday. No exertional chest pain/anginal symptoms. Denies tachypalpitation, lightheadedness/near syncope/syncope. Reports recent TTE December 2022. Does have daytime sleepiness and sleep disturbance at night.       Objective     Vital Signs:   Vitals:    12/26/23 0827 12/26/23 0829 12/26/23 0831 12/26/23 0925   BP: 152/83 156/92 140/82 136/84   BP Location: Right arm Left arm Left arm    Patient Position: Sitting Standing Sitting    Cuff Size: Adult Adult Adult    Pulse: 78 72 61    Resp:   16    Temp:   97.5 °F (36.4 °C)    TempSrc:   Temporal    SpO2: 93% 95% 95%    Weight:   85.9 kg (189 lb 5 oz)    Height:   172.7 cm (67.99\")  "     Body mass index is 28.79 kg/m².  Physical Exam  Vitals and nursing note reviewed.   Constitutional:       Appearance: Normal appearance.   HENT:      Head: Normocephalic.   Eyes:      Extraocular Movements: Extraocular movements intact.   Neck:      Vascular: No carotid bruit.   Cardiovascular:      Rate and Rhythm: Normal rate and regular rhythm.      Pulses: Normal pulses.      Heart sounds: Normal heart sounds, S1 normal and S2 normal. No murmur heard.  Pulmonary:      Effort: Pulmonary effort is normal. No respiratory distress.      Breath sounds: Normal breath sounds.      Comments: Faint bibasilar crackles  Musculoskeletal:      Cervical back: Neck supple.      Right lower leg: Edema present.      Left lower leg: Edema present.      Comments: 1+ bilateral LE edema   Skin:     General: Skin is warm and dry.   Neurological:      General: No focal deficit present.      Mental Status: He is alert.   Psychiatric:         Mood and Affect: Mood normal.         Behavior: Behavior normal.         Thought Content: Thought content normal.       Data Reviewed:{ Labs  Result Review  Imaging  Med Tab  Media :23}     High Sensitivity Troponin T 2Hr (12/20/2023 11:19)  High Sensitivity Troponin T (12/20/2023 09:02)  BNP (12/20/2023 09:02)  Comprehensive Metabolic Panel (12/20/2023 09:02)  CBC & Differential (12/20/2023 09:02)  COVID PRE-OP / PRE-PROCEDURE SCREENING ORDER (NO ISOLATION) - Swab, Nasopharynx (12/20/2023 08:43)  XR Chest 1 View (12/20/2023 08:52)     CT Angiogram Chest (07/13/2023 11:15)       Assessment & Plan   Assessment and Plan {CC Problem List  Visit Diagnosis  ROS  Review (Popup)  Health Maintenance  Quality  BestPractice  Medications  SmartSets  SnapShot Encounters  Media :23}     1. Acute diastolic CHF (congestive heart failure)  -Shortness of breath/LE edema improved since ED evaluation and initiation of furosemide. Recent hypervolemia/dyspnea prompted ED evaluation  -BNP  significantly elevated at ED with small pleural effusions  -Historical TTE approximately one year ago December 2022 reported as normal (data Deficit)  -Repeat TTE for structural/functional evaluation  -Continue furosemide 40mg daily, spironolactone.   -Continue Bisoprolol, losartan for afterload control.   -Repeat BMP/BNP today  - Ambulatory Referral to Cardiology- requests transfer of care to  cardiology.     2. SSS (sick sinus syndrome)  -s/p [facility] PPM  -AV paced on ED ECG    3. Essential hypertension  -Well controlled today  -Continue current medication regimen as above  --Continue Bisoprolol, losartan for afterload control.   - Adult Transthoracic Echo Complete W/ Cont if Necessary Per Protocol; Future  - Basic Metabolic Panel; Future    4. Suspected sleep apnea  -Notes intermittent daytime sleepiness, sleep disturbance  - Ambulatory Referral to Sleep Medicine      Follow Up {Instructions Charge Capture  Follow-up Communications :23}     Return in about 3 days (around 12/29/2023) for Video visit, fluid, Recheck.    Time Spent: I spent 53 minutes caring for Agapito Downey on this date of service. This time includes time spent by me in the following activities: preparing for the visit, reviewing tests, obtaining and/or reviewing a separately obtained history, performing a medically appropriate examination and/or evaluation, counseling and educating the patient/family/caregiver, documenting information in the medical record and independently interpreting results and communicating that information with the patient/family/caregiver. All time noted occurred on the date of service.    Patient was given instructions and counseling regarding his condition or for health maintenance advice. Please see specific information pulled into the AVS if appropriate.  Patient was instructed to call the Heart and Valve Center with any questions, concerns, or worsening symptoms.    Dictated Utilizing Dragon Dictation    Please note that portions of this note were completed with a voice recognition program.   Part of this note may be an electronic transcription/translation of spoken language to printed text using the Dragon Dictation System.

## 2023-12-27 ENCOUNTER — TELEPHONE (OUTPATIENT)
Dept: CARDIOLOGY | Facility: HOSPITAL | Age: 88
End: 2023-12-27
Payer: MEDICARE

## 2023-12-27 NOTE — TELEPHONE ENCOUNTER
----- Message from KIRAN Orlando sent at 12/27/2023  9:46 AM EST -----  Can you let Mr. Alberts know:    Fluid level is much improved on lab work, but still elevated. Kidney function and potassium are normal. Continue current medications and plan discussed at yesterday's visit. I will see him on video visit Friday.    Thanks

## 2023-12-27 NOTE — PROGRESS NOTES
Can you let Mr. Alberts know:    Fluid level is much improved on lab work, but still elevated. Kidney function and potassium are normal. Continue current medications and plan discussed at yesterday's visit. I will see him on video visit Friday.    Thanks

## 2023-12-29 ENCOUNTER — TELEMEDICINE (OUTPATIENT)
Dept: CARDIOLOGY | Facility: HOSPITAL | Age: 88
End: 2023-12-29
Payer: MEDICARE

## 2023-12-29 VITALS — DIASTOLIC BLOOD PRESSURE: 83 MMHG | HEART RATE: 60 BPM | SYSTOLIC BLOOD PRESSURE: 135 MMHG

## 2023-12-29 DIAGNOSIS — R29.818 SUSPECTED SLEEP APNEA: ICD-10-CM

## 2023-12-29 DIAGNOSIS — I49.5 SSS (SICK SINUS SYNDROME): ICD-10-CM

## 2023-12-29 DIAGNOSIS — I50.31 ACUTE DIASTOLIC CHF (CONGESTIVE HEART FAILURE): Primary | ICD-10-CM

## 2023-12-29 DIAGNOSIS — I10 ESSENTIAL HYPERTENSION: Chronic | ICD-10-CM

## 2023-12-29 RX ORDER — POTASSIUM CHLORIDE 750 MG/1
10 TABLET, EXTENDED RELEASE ORAL 2 TIMES DAILY
Qty: 60 TABLET | Refills: 1 | Status: SHIPPED | OUTPATIENT
Start: 2023-12-29

## 2023-12-29 RX ORDER — FUROSEMIDE 40 MG/1
40 TABLET ORAL 2 TIMES DAILY
Qty: 60 TABLET | Refills: 0 | Status: SHIPPED | OUTPATIENT
Start: 2023-12-29

## 2023-12-29 NOTE — PROGRESS NOTES
Mode of visit: Video  Location of patient: Home   You have chosen to receive care through the use of telemedicine. Telemedicine enables health care providers at different locations to provide safe, effective, and convenient care through the use of technology. As with any health care service, there are risks associated with the use of telemedicine, including equipment failure, poor connections, and  issues.  Do you understand the risks and benefits of telemedicine as I have explained them to you? Yes  Have your questions regarding telemedicine been answered? Yes  Do you consent to the use of telemedicine in your medical care today? Yes      Chief Complaint  Diastolic heart failure follow-up    [facility]  Heart and Valve Center  Telemedicine note    This was a video enabled telemedicine encounter.    Subjective    History of Present Illness {CC  Problem List  Visit  Diagnosis   Encounters  Notes  Medications  Labs  Result Review Imaging  Media :23}     Agapito Downey, 88 y.o. male with CAD, SSS s/p dual-chamber PPM ([facility]), history of MI, HTN, HLD, remote prostate CA  presents to [facility] telemedicine visit for Diastolic heart failure follow-up.    Patient previously evaluated at [facility] emergency department for complaints of shortness of breath.  Emergency department work-up revealed unchanged troponin delta. BNP elevated at 15,013. CXR with small bilateral pleural effusions, and ECGs with AV paced rhtyhm. Patient was instructed to follow-up at [facility] heart and valve clinic for further evaluation. ED documentation notes they discussed plan with Dr. Campbell and prescribed 40mg furosemide at discharge. During previous evaluation furosemide was increased to BID dosing.     Since previous evaluation BNP still elevated but improved to 7688 from 15,013; creatinine also improved.     Presents today reporting LE edema  essentially resolved, no shortness of breath. Weight trending down approximately 1lb per day. -140 on home monitoring. No chest pain, tachypalpitation, near syncope/syncope. Maintaining adequate hydration.  Echocardiogram scheduled 1/5/2024.  Cardiology referral pending.      Objective     Vital Signs:   Vitals:    12/29/23 1340   BP: 135/83   Pulse: 60     There is no height or weight on file to calculate BMI.  Physical Exam  Vitals reviewed.   Constitutional:       Appearance: Normal appearance.   HENT:      Head: Normocephalic.   Pulmonary:      Effort: Pulmonary effort is normal. No respiratory distress.   Neurological:      Mental Status: He is alert.   Psychiatric:         Mood and Affect: Mood normal.         Behavior: Behavior normal.         Thought Content: Thought content normal.        Data Reviewed:{ Labs  Result Review  Imaging  Med Tab  Media :23}     proBNP (12/26/2023 10:05)  Basic Metabolic Panel (12/26/2023 10:05)  High Sensitivity Troponin T 2Hr (12/20/2023 11:19)  High Sensitivity Troponin T (12/20/2023 09:02)  BNP (12/20/2023 09:02)  Comprehensive Metabolic Panel (12/20/2023 09:02)  CBC & Differential (12/20/2023 09:02)  COVID PRE-OP / PRE-PROCEDURE SCREENING ORDER (NO ISOLATION) - Swab, Nasopharynx (12/20/2023 08:43)  XR Chest 1 View (12/20/2023 08:52)      CT Angiogram Chest (07/13/2023 11:15)     Assessment and Plan {CC Problem List  Visit Diagnosis  ROS  Review (Popup)  Health Maintenance  Quality  BestPractice  Medications  SmartSets  SnapShot Encounters  Media :23}     This visit has been scheduled as a video visit to comply with patient safety concerns in accordance with CDC recommendations.     1. Acute diastolic CHF (congestive heart failure)  -Shortness of breath/LE edema improved since previous evaluation this week  -BNP improved but still elevated  -Historical TTE approximately one year ago December 2022 reported as normal (data Deficit)  -Repeat TTE for  structural/functional evaluation, scheduled in ~1 week  -Continue furosemide 40mg twice daily, spironolactone.   -Increase potassium to BID  -Continue Bisoprolol, losartan for afterload control.   -Repeat BMP/BNP in 2 weeks  - Ambulatory Referral to Cardiology pending- requests transfer of care to  cardiology.      2. SSS (sick sinus syndrome)  -s/p [facility] PPM  -AV paced on ED ECG     3. Essential hypertension  -Reasonable control  -Continue current medication regimen as above  -Continue Bisoprolol, losartan for afterload control.   - Adult Transthoracic Echo Complete W/ Cont if Necessary Per Protocol; Future     4. Suspected sleep apnea  -Notes intermittent daytime sleepiness, sleep disturbance  - Ambulatory Referral to Sleep Medicine pending      Follow Up {Instructions Charge Capture  Follow-up Communications :23}   Return in about 2 weeks (around 1/12/2024) for Office follow-up, fluid, Recheck, Results follow-up.      Patient was given instructions and counseling regarding his condition or for health maintenance advice. Please see specific information pulled into the AVS if appropriate.  Patient was instructed to call the Heart and Valve Center with any questions, concerns, or worsening symptoms.    *Please note that portions of this note were completed with a voice recognition program. Efforts were made to edit the dictations, but occasionally words are mistranscribed.

## 2023-12-29 NOTE — PATIENT INSTRUCTIONS
- Continue furosemide twice daily    - Increase potassium to twice daily    - Echocardiogram next week    - Follow-up in heart clinic or with cardiology in 2 weeks

## 2024-01-05 ENCOUNTER — HOSPITAL ENCOUNTER (OUTPATIENT)
Dept: CARDIOLOGY | Facility: HOSPITAL | Age: 89
Discharge: HOME | End: 2024-01-05
Payer: MEDICARE

## 2024-01-05 VITALS — BODY MASS INDEX: 29.66 KG/M2 | WEIGHT: 189 LBS | HEIGHT: 67 IN

## 2024-01-05 DIAGNOSIS — I10 ESSENTIAL HYPERTENSION: Chronic | ICD-10-CM

## 2024-01-05 DIAGNOSIS — I50.31 ACUTE DIASTOLIC CHF (CONGESTIVE HEART FAILURE): ICD-10-CM

## 2024-01-05 LAB
ASCENDING AORTA: 3.5 CM
AV VENA CONTRACTA: 0.34 CM
BH CV ECHO MEAS - AO MAX PG: 3.9 MMHG
BH CV ECHO MEAS - AO MEAN PG: 2 MMHG
BH CV ECHO MEAS - AO ROOT DIAM: 4.7 CM
BH CV ECHO MEAS - AO V2 MAX: 99.2 CM/SEC
BH CV ECHO MEAS - AO V2 VTI: 22.7 CM
BH CV ECHO MEAS - AVA(I,D): 1.78 CM2
BH CV ECHO MEAS - EDV(CUBED): 97.3 ML
BH CV ECHO MEAS - EDV(MOD-SP2): 133 ML
BH CV ECHO MEAS - EDV(MOD-SP4): 103 ML
BH CV ECHO MEAS - EF(MOD-BP): 52 %
BH CV ECHO MEAS - EF(MOD-SP2): 51.1 %
BH CV ECHO MEAS - EF(MOD-SP4): 54.4 %
BH CV ECHO MEAS - ESV(CUBED): 61.2 ML
BH CV ECHO MEAS - ESV(MOD-SP2): 65 ML
BH CV ECHO MEAS - ESV(MOD-SP4): 47 ML
BH CV ECHO MEAS - FS: 14.3 %
BH CV ECHO MEAS - IVS/LVPW: 1.2 CM
BH CV ECHO MEAS - IVSD: 1.36 CM
BH CV ECHO MEAS - LA DIMENSION: 3.5 CM
BH CV ECHO MEAS - LAT PEAK E' VEL: 9.5 CM/SEC
BH CV ECHO MEAS - LV DIASTOLIC VOL/BSA (35-75): 52.2 CM2
BH CV ECHO MEAS - LV MASS(C)D: 216.1 GRAMS
BH CV ECHO MEAS - LV MAX PG: 1.33 MMHG
BH CV ECHO MEAS - LV MEAN PG: 1 MMHG
BH CV ECHO MEAS - LV SYSTOLIC VOL/BSA (12-30): 23.8 CM2
BH CV ECHO MEAS - LV V1 MAX: 57.6 CM/SEC
BH CV ECHO MEAS - LV V1 VTI: 10.6 CM
BH CV ECHO MEAS - LVIDD: 4.6 CM
BH CV ECHO MEAS - LVIDS: 3.9 CM
BH CV ECHO MEAS - LVOT AREA: 3.8 CM2
BH CV ECHO MEAS - LVOT DIAM: 2.2 CM
BH CV ECHO MEAS - LVPWD: 1.13 CM
BH CV ECHO MEAS - MED PEAK E' VEL: 4.64 CM/SEC
BH CV ECHO MEAS - MR MAX PG: 74 MMHG
BH CV ECHO MEAS - MR MAX VEL: 430 CM/SEC
BH CV ECHO MEAS - MR MEAN PG: 46 MMHG
BH CV ECHO MEAS - MR MEAN VEL: 317 CM/SEC
BH CV ECHO MEAS - MR VTI: 149 CM
BH CV ECHO MEAS - MV A MAX VEL: 27.1 CM/SEC
BH CV ECHO MEAS - MV DEC SLOPE: 250 CM/SEC2
BH CV ECHO MEAS - MV DEC TIME: 0.42 SEC
BH CV ECHO MEAS - MV E MAX VEL: 49.9 CM/SEC
BH CV ECHO MEAS - MV E/A: 1.84
BH CV ECHO MEAS - MV MAX PG: 2.9 MMHG
BH CV ECHO MEAS - MV MEAN PG: 1 MMHG
BH CV ECHO MEAS - MV P1/2T: 98.3 MSEC
BH CV ECHO MEAS - MV V2 VTI: 32.3 CM
BH CV ECHO MEAS - MVA(P1/2T): 2.24 CM2
BH CV ECHO MEAS - MVA(VTI): 1.25 CM2
BH CV ECHO MEAS - PA ACC SLOPE: 241 CM/SEC2
BH CV ECHO MEAS - PA ACC TIME: 0.14 SEC
BH CV ECHO MEAS - PA V2 MAX: 99.1 CM/SEC
BH CV ECHO MEAS - PI END-D VEL: 148 CM/SEC
BH CV ECHO MEAS - RAP SYSTOLE: 3 MMHG
BH CV ECHO MEAS - RVSP: 41.7 MMHG
BH CV ECHO MEAS - SI(MOD-SP2): 34.4 ML/M2
BH CV ECHO MEAS - SI(MOD-SP4): 28.4 ML/M2
BH CV ECHO MEAS - SV(LVOT): 40.3 ML
BH CV ECHO MEAS - SV(MOD-SP2): 68 ML
BH CV ECHO MEAS - SV(MOD-SP4): 56 ML
BH CV ECHO MEAS - TAPSE (>1.6): 1.2 CM
BH CV ECHO MEAS - TR MAX PG: 38.7 MMHG
BH CV ECHO MEAS - TR MAX VEL: 311 CM/SEC
BH CV ECHO MEASUREMENTS AVERAGE E/E' RATIO: 7.06
BH CV VAS BP LEFT ARM: NORMAL MMHG
BH CV XLRA - RV BASE: 4.4 CM
BH CV XLRA - RV LENGTH: 6.2 CM
BH CV XLRA - RV MID: 3.9 CM
BH CV XLRA - TDI S': 8.31 CM/SEC
IVRT: 121 MS
LEFT ATRIUM VOLUME INDEX: 23.9 ML/M2
MV VENA CONTRACTA: 0.55 CM

## 2024-01-05 PROCEDURE — 93306 TTE W/DOPPLER COMPLETE: CPT

## 2024-01-11 ENCOUNTER — LAB (OUTPATIENT)
Dept: LAB | Facility: HOSPITAL | Age: 89
End: 2024-01-11
Payer: MEDICARE

## 2024-01-11 ENCOUNTER — OFFICE VISIT (OUTPATIENT)
Dept: CARDIOLOGY | Facility: HOSPITAL | Age: 89
End: 2024-01-11
Payer: MEDICARE

## 2024-01-11 VITALS
RESPIRATION RATE: 20 BRPM | OXYGEN SATURATION: 96 % | TEMPERATURE: 97.3 F | HEART RATE: 70 BPM | SYSTOLIC BLOOD PRESSURE: 142 MMHG | BODY MASS INDEX: 29.19 KG/M2 | WEIGHT: 186 LBS | HEIGHT: 67 IN | DIASTOLIC BLOOD PRESSURE: 70 MMHG

## 2024-01-11 DIAGNOSIS — I50.32 CHRONIC HEART FAILURE WITH PRESERVED EJECTION FRACTION (HFPEF): ICD-10-CM

## 2024-01-11 DIAGNOSIS — I10 ESSENTIAL HYPERTENSION: ICD-10-CM

## 2024-01-11 DIAGNOSIS — I50.32 CHRONIC HEART FAILURE WITH PRESERVED EJECTION FRACTION (HFPEF): Primary | ICD-10-CM

## 2024-01-11 DIAGNOSIS — I25.810 CORONARY ARTERY DISEASE INVOLVING CORONARY BYPASS GRAFT OF NATIVE HEART WITHOUT ANGINA PECTORIS: ICD-10-CM

## 2024-01-11 LAB
ANION GAP SERPL CALCULATED.3IONS-SCNC: 12.3 MMOL/L (ref 5–15)
BUN SERPL-MCNC: 20 MG/DL (ref 8–23)
BUN/CREAT SERPL: 15.6 (ref 7–25)
CALCIUM SPEC-SCNC: 10.1 MG/DL (ref 8.6–10.5)
CHLORIDE SERPL-SCNC: 100 MMOL/L (ref 98–107)
CO2 SERPL-SCNC: 27.7 MMOL/L (ref 22–29)
CREAT SERPL-MCNC: 1.28 MG/DL (ref 0.76–1.27)
EGFRCR SERPLBLD CKD-EPI 2021: 54.5 ML/MIN/1.73
GLUCOSE SERPL-MCNC: 122 MG/DL (ref 65–99)
NT-PROBNP SERPL-MCNC: 5193 PG/ML (ref 0–1800)
POTASSIUM SERPL-SCNC: 4.6 MMOL/L (ref 3.5–5.2)
SODIUM SERPL-SCNC: 140 MMOL/L (ref 136–145)

## 2024-01-11 PROCEDURE — 36415 COLL VENOUS BLD VENIPUNCTURE: CPT

## 2024-01-11 PROCEDURE — 80048 BASIC METABOLIC PNL TOTAL CA: CPT

## 2024-01-11 PROCEDURE — 83880 ASSAY OF NATRIURETIC PEPTIDE: CPT

## 2024-01-11 RX ORDER — BISOPROLOL FUMARATE 5 MG/1
7.5 TABLET, FILM COATED ORAL NIGHTLY
Qty: 90 TABLET | Refills: 0 | Status: SHIPPED | OUTPATIENT
Start: 2024-01-11

## 2024-01-11 RX ORDER — ASPIRIN 81 MG/1
81 TABLET ORAL DAILY
Start: 2024-01-11

## 2024-01-11 NOTE — PROGRESS NOTES
Chief Complaint  Heart Failure Follow-up    Subjective      History of Present Illness {CC  Problem List  Visit  Diagnosis   Encounters  Notes  Medications  Labs  Result Review Imaging  Media :23}     Agapito Downey, 88 y.o. male with HFpEF, paroxysmal AF, CAD s/p CABG, SSS s/p dual-chamber PPM ([facility]), history of MI, HTN, HLD, T2DM, remote prostate CA presents to [facility] for heart failure follow-up. Primary cardiologist is Dr. Campbell.    Since previous evaluation patient completed echocardiogram that revealed normal systolic function, calculated LVEF 52%.  Mild to moderate MR, trace to mild aortic insufficiency.  Repeat BNP improved since ED evaluation. Patient is scheduled to establish care with Dr. Jamison 1/24/2024.     Presents today with continued improvement in shortness of breath. Weight now down approximately 6lb since initial evaluation. LE edema improved but still with 1+ LE edema. Does not generally lie flat due to previously being anxious the past few months when lying flat due to orthopnea symptoms. Denies chest pain/anginal symptoms, palpitation/tachypalpitation. Incidentally notes he was diagnosed with atrial fibrillation last year on PPM interrogation (reports a burden of approximately 2% on one interrogation) and initiated on apixaban by Dr. Campbell; subsequently taken off of anticoagulation due to epistaxis events. SBP generally 140s on home monitoring, SBP historically has been greater than 140 .       Initial evaluation:  Patient was recently evaluated at [facility] emergency department for complaints of shortness of breath.  Emergency department work-up revealed unchanged troponin delta. BNP elevated at 15,013. CXR with small bilateral pleural effusions, and ECGs with AV paced rhtyhm. Patient was instructed to follow-up at [facility] heart and valve clinic for further evaluation. ED documentation notes they discussed plan with  "Dr. Campbell and prescribed 40mg furosemide at discharge.     Patient presents today feeling improvement in respiratory status/LE edema since ED evaluation. Reports onset of increased LE edema and some orthopnea/anxiety approximately 10 days ago. Since initiation of furosemide shortness of breath has essentially resolved; no dyspnea when walking around living areas at [place] yesterday. No exertional chest pain/anginal symptoms. Denies tachypalpitation, lightheadedness/near syncope/syncope. Reports recent TTE December 2022. Does have daytime sleepiness and sleep disturbance at night.       Objective     Vital Signs:   Vitals:    01/11/24 1255 01/11/24 1405   BP: 142/70    BP Location: Left arm    Patient Position: Sitting    Cuff Size: Adult    Pulse: 91 70   Resp: 20    Temp: 97.3 °F (36.3 °C)    TempSrc: Temporal    SpO2: 96%    Weight: 84.4 kg (186 lb)    Height: 170.2 cm (67\")      Body mass index is 29.13 kg/m².  Physical Exam  Vitals and nursing note reviewed.   Constitutional:       Appearance: Normal appearance.   HENT:      Head: Normocephalic.   Eyes:      Extraocular Movements: Extraocular movements intact.   Neck:      Vascular: No carotid bruit.   Cardiovascular:      Rate and Rhythm: Normal rate and regular rhythm.      Pulses: Normal pulses.      Heart sounds: Normal heart sounds, S1 normal and S2 normal. No murmur heard.  Pulmonary:      Effort: Pulmonary effort is normal. No respiratory distress.      Breath sounds: Normal breath sounds.      Comments: Faint bibasilar crackles  Musculoskeletal:      Cervical back: Neck supple.      Right lower leg: Edema present.      Left lower leg: Edema present.      Comments: 1+ bilateral LE edema   Skin:     General: Skin is warm and dry.   Neurological:      General: No focal deficit present.      Mental Status: He is alert.   Psychiatric:         Mood and Affect: Mood normal.         Behavior: Behavior normal.         Thought Content: Thought content " normal.       Data Reviewed:{ Labs  Result Review  Imaging  Med Tab  Media :23}     Adult Transthoracic Echo Complete W/ Cont if Necessary Per Protocol (01/05/2024 10:55)   proBNP (12/26/2023 10:05)  Basic Metabolic Panel (12/26/2023 10:05)    High Sensitivity Troponin T 2Hr (12/20/2023 11:19)  High Sensitivity Troponin T (12/20/2023 09:02)  BNP (12/20/2023 09:02)  Comprehensive Metabolic Panel (12/20/2023 09:02)  CBC & Differential (12/20/2023 09:02)  COVID PRE-OP / PRE-PROCEDURE SCREENING ORDER (NO ISOLATION) - Swab, Nasopharynx (12/20/2023 08:43)  XR Chest 1 View (12/20/2023 08:52)     CT Angiogram Chest (07/13/2023 11:15)       Assessment & Plan   Assessment and Plan {CC Problem List  Visit Diagnosis  ROS  Review (Popup)  Health Maintenance  Quality  BestPractice  Medications  SmartSets  SnapShot Encounters  Media :23}     1. HFpEF (Heart Failure Preserved EF)  -Shortness of breath/LE edema improved since ED evaluation and initiation of furosemide. Recent hypervolemia/dyspnea prompted ED evaluation  -Recent TTE with calculated LVEF 52%.  -BNP improved. Previously significantly elevated at ED with small pleural effusions  -Further afterload control needed; increase bisoprolol to 7.5mg daily. Continue losartan 50mg BID.  -Continue furosemide 40mg BID, spironolactone.   -Initiate Jardiance 10mg daily  -Repeat BMP/BNP today  -  Cardiology referral pending; continue with upcoming appt with Dr. Jamison as scheduled in approximately 2 weeks.     2. Atrial Fibrillaiton  -Reports a history of low burden PAF on device interrogation last year  -Recent ECG with paced rhtyhm, regular rate/rhtyhm at time of exam  -Anticoagulation previously discontinued due to recurrent epistaxis  -[id number]:5  -ASA 81mg daily   -Bisoprolol as above  -Discussed Watchman device due to history of anticoagulation intolerance, will research and consider.     3. CAD  -s/p remote CABG  -Denies exertional chest pain/anginal  symptoms  -Recent TTE as above  -Continue ASA. Intolerant of statin    4. SSS (sick sinus syndrome)  -s/p [facility] PPM  -AV paced on ED ECG  -Continue with upcoming establishing care with Dr. Jamison as scheduled; transitioning from Dr. Campbell    5. Essential hypertension  -Elevated today and home monitoring  -Increase bisoprolol as above  -Continue losartan, spironolactone for afterload control.   -BMP today    6. Suspected sleep apnea  -Notes intermittent daytime sleepiness, sleep disturbance  -Referred to sleep medicine during initial evaluation  -Continue with upcoming sleep medicine appointment as scheduled next week       Follow Up {Instructions Charge Capture  Follow-up Communications :23}     Return if symptoms worsen or fail to improve.    Time Spent: I spent 48 minutes caring for Agapito Downey on this date of service. This time includes time spent by me in the following activities: preparing for the visit, reviewing tests, obtaining and/or reviewing a separately obtained history, performing a medically appropriate examination and/or evaluation, counseling and educating the patient/family/caregiver, documenting information in the medical record and independently interpreting results and communicating that information with the patient/family/caregiver. All time noted occurred on the date of service.    Patient was given instructions and counseling regarding his condition or for health maintenance advice. Please see specific information pulled into the AVS if appropriate.  Patient was instructed to call the [facility] with any questions, concerns, or worsening symptoms.    Dictated Utilizing Dragon Dictation   Please note that portions of this note were completed with a voice recognition program.   Part of this note may be an electronic transcription/translation of spoken language to printed text using the Dragon Dictation System.

## 2024-01-12 ENCOUNTER — OFFICE VISIT (OUTPATIENT)
Dept: INTERNAL MEDICINE | Facility: CLINIC | Age: 89
End: 2024-01-12
Payer: MEDICARE

## 2024-01-12 VITALS
SYSTOLIC BLOOD PRESSURE: 126 MMHG | WEIGHT: 183.4 LBS | DIASTOLIC BLOOD PRESSURE: 74 MMHG | HEART RATE: 68 BPM | BODY MASS INDEX: 28.79 KG/M2 | HEIGHT: 67 IN | TEMPERATURE: 97.7 F

## 2024-01-12 DIAGNOSIS — I50.32 CHRONIC DIASTOLIC (CONGESTIVE) HEART FAILURE: ICD-10-CM

## 2024-01-12 DIAGNOSIS — I50.32 CHRONIC HEART FAILURE WITH PRESERVED EJECTION FRACTION (HFPEF): Primary | ICD-10-CM

## 2024-01-12 DIAGNOSIS — E11.42 DIABETIC POLYNEUROPATHY ASSOCIATED WITH TYPE 2 DIABETES MELLITUS: Primary | ICD-10-CM

## 2024-01-12 DIAGNOSIS — I10 ESSENTIAL HYPERTENSION: Chronic | ICD-10-CM

## 2024-01-15 ENCOUNTER — TELEPHONE (OUTPATIENT)
Dept: CARDIOLOGY | Facility: HOSPITAL | Age: 89
End: 2024-01-15
Payer: MEDICARE

## 2024-01-15 NOTE — TELEPHONE ENCOUNTER
Mario Alberto Gonzalez APRN  You34 minutes ago (1:07 PM)       Potentially related to aspirin since he recently restarted.   Would recommend holding aspirin for 3 days, and then resume.    Thanks     Spoke to patient and recommended that he hold aspirin for 3 days and then resume. Explained that his bloody nose, per Reuben Gonzalez, could be related to his aspirin. Patient verbalized understanding and had no further questions.

## 2024-01-15 NOTE — TELEPHONE ENCOUNTER
Grover called with concerns about having had a blood nose x2 and was wondering if it could be related to his aspirin.

## 2024-01-16 ENCOUNTER — OFFICE VISIT (OUTPATIENT)
Dept: SLEEP MEDICINE | Facility: HOSPITAL | Age: 89
End: 2024-01-16
Payer: MEDICARE

## 2024-01-16 VITALS
DIASTOLIC BLOOD PRESSURE: 80 MMHG | HEIGHT: 67 IN | OXYGEN SATURATION: 96 % | BODY MASS INDEX: 28.94 KG/M2 | WEIGHT: 184.4 LBS | SYSTOLIC BLOOD PRESSURE: 149 MMHG | HEART RATE: 63 BPM

## 2024-01-16 DIAGNOSIS — G47.33 OBSTRUCTIVE SLEEP APNEA, ADULT: ICD-10-CM

## 2024-01-16 DIAGNOSIS — G47.19 EXCESSIVE DAYTIME SLEEPINESS: ICD-10-CM

## 2024-01-16 DIAGNOSIS — F51.04 CHRONIC INSOMNIA: ICD-10-CM

## 2024-01-16 DIAGNOSIS — R06.83 SNORING: Primary | ICD-10-CM

## 2024-01-16 NOTE — PROGRESS NOTES
Agapito Downey is a 88 y.o. male.   Chief Complaint   Patient presents with    Sleeping Problem       HPI     88 y.o. male seen in consultation at the request of KIRAN Orlando for evaluation of the above.     He began having problems with his sleep less than a year ago and primarily over the summer.  He was struggling at that time with what was felt to be decompensation of diastolic heart failure.  He began having problems with sleeping in bed at night and has trouble with chest pain and shortness of breath at night.  The symptoms have resolved but he has had persistent problems with sleep.    Typical night for him is that he will get in bed around 11 PM and fall asleep relatively quickly.  He will then awaken after about 2 hours.  It could be more could be less but in general it is around 2 hours.  He will then get out of bed and walk around a bit and go into the other room.  He will then sit down in a recliner and fall asleep.  He will then awaken after other several hours and repeat the process.  Typically he does not go back into his bedroom though he has been known to do that.    He is not sure how much sleep he gets on average at night.  He typically will put trying to sleep after 7 AM.  He will take an hour nap during the day.    He has been told he snores in the past but thinks this is improved.  His wife has not noticed it as much recently.  He does awaken with a dry mouth.    He denies any RLS type symptoms at night.  He has no significant nocturnal pain.  He typically sleeps with his bed a bit elevated due to reflux though this is controlled currently    Aroda Scale is: 10/24    The patient's relevant past medical, surgical, family, and social history reviewed and updated in Epic as appropriate.    Current medications are:   Current Outpatient Medications:     bisoprolol (ZEBeta) 5 MG tablet, Take 1.5 tablets by mouth Every Night., Disp: 90 tablet, Rfl: 0    cholecalciferol (D3-50) 1.25 MG  (26640 UT) capsule, Take 1 capsule by mouth 1 (One) Time Per Week., Disp: , Rfl:     empagliflozin (Jardiance) 10 MG tablet tablet, Take 1 tablet by mouth Daily., Disp: 90 tablet, Rfl: 0    Evolocumab (REPATHA) solution auto-injector SureClick injection, Inject 1 mL under the skin into the appropriate area as directed Every 14 (Fourteen) Days., Disp: , Rfl:     furosemide (LASIX) 40 MG tablet, Take 1 tablet by mouth 2 (Two) Times a Day., Disp: 60 tablet, Rfl: 0    ketotifen (ZADITOR) 0.025 % ophthalmic solution, Apply 1 drop to eye(s) as directed by provider Daily As Needed., Disp: , Rfl:     levothyroxine (SYNTHROID, LEVOTHROID) 75 MCG tablet, TAKE ONE TABLET BY MOUTH ONE TIME DAILY, Disp: 90 tablet, Rfl: 3    losartan (COZAAR) 50 MG tablet, Take 1 tablet by mouth Daily. (Patient taking differently: Take 1 tablet by mouth 2 (Two) Times a Day.), Disp: , Rfl:     magnesium oxide (MAG-OX) 400 MG tablet, Take 1 tablet by mouth Daily., Disp: , Rfl:     metFORMIN ER (GLUCOPHAGE-XR) 500 MG 24 hr tablet, TAKE ONE TABLET BY MOUTH ONE TIME DAILY, Disp: 90 tablet, Rfl: 1    Multiple Vitamins-Minerals (CENTRUM SILVER 50+MEN PO), Take 1 tablet by mouth Daily., Disp: , Rfl:     polyethyl glycol-propyl glycol (SYSTANE) 0.4-0.3 % solution ophthalmic solution, Apply 1 drop to eye(s) as directed by provider Daily As Needed., Disp: , Rfl:     psyllium (METAMUCIL) 100 % pack packet, Take 1 packet by mouth Daily., Disp: , Rfl:     spironolactone (ALDACTONE) 25 MG tablet, TAKE ONE TABLET BY MOUTH EVERY OTHER DAY (Patient taking differently: Takes on even numbered days), Disp: 45 tablet, Rfl: 5    aspirin 81 MG EC tablet, Take 1 tablet by mouth Daily. (Patient not taking: Reported on 1/16/2024), Disp: , Rfl:     potassium chloride (K-DUR,KLOR-CON) 10 MEQ CR tablet, Take 1 tablet by mouth 2 (Two) Times a Day. (Patient not taking: Reported on 1/16/2024), Disp: 60 tablet, Rfl: 1    Probiotic Product (ALIGN) 4 MG capsule, Take 1 capsule by  "mouth Daily. (Patient not taking: Reported on 1/16/2024), Disp: , Rfl: .    Review of Systems    Review of Systems  ROS documented in patient questionnaire ×14 systems.  Reviewed with patient.  Otherwise negative except as noted in HPI.    Physical Exam    Blood pressure 149/80, pulse 63, height 170.2 cm (67\"), weight 83.6 kg (184 lb 6.4 oz), SpO2 96%. Body mass index is 28.88 kg/m².    Physical Exam  Vitals and nursing note reviewed.   Constitutional:       Appearance: Normal appearance. He is well-developed.   HENT:      Head: Normocephalic and atraumatic.      Nose: Nose normal.      Mouth/Throat:      Mouth: Mucous membranes are moist.      Pharynx: Oropharynx is clear. No oropharyngeal exudate.      Comments: Class IV airway  Eyes:      General: No scleral icterus.     Conjunctiva/sclera: Conjunctivae normal.   Neck:      Thyroid: No thyromegaly.      Trachea: No tracheal deviation.   Cardiovascular:      Rate and Rhythm: Normal rate and regular rhythm.      Heart sounds: No murmur heard.     No friction rub. No gallop.   Pulmonary:      Effort: Pulmonary effort is normal. No respiratory distress.      Breath sounds: No wheezing or rales.   Musculoskeletal:         General: No deformity. Normal range of motion.      Right lower leg: Edema present.      Left lower leg: Edema present.   Skin:     General: Skin is warm and dry.      Findings: No rash.   Neurological:      Mental Status: He is alert and oriented to person, place, and time.   Psychiatric:         Behavior: Behavior normal.         Thought Content: Thought content normal.     DATA:    Reviewed 1/5/2024 echocardiogram report revealing diminished ejection fraction and diastolic dysfunction    Reviewed 12/26/2023 note from KIRAN Orlando    ASSESSMENT:    Problem List Items Addressed This Visit          Pulmonary Problems    Snoring - Primary    Relevant Orders    Home Sleep Study       Other    Chronic insomnia     Other Visit Diagnoses       " Excessive daytime sleepiness        Relevant Orders    Home Sleep Study    Obstructive sleep apnea, adult        Relevant Orders    Home Sleep Study            88-year-old male with what seems to be a combination of persistent insomnia which has been present for less than a year.  This is primarily sleep maintenance insomnia.  He also has snoring and frequent nocturnal awakenings combined with a class IV airway which puts him at risk for the presence of KALA.  He has hypertension and diastolic heart failure as comorbidities.    I suggested a 2 pronged approach.  The first prong focusing on improving sleep hygiene and employing stimulus control therapy another aspects of CBT-I.  He was very interested in learning more about this and seems very cognitively curious about these types of things and I think he would be a good candidate for employing these techniques.    The second prong being looking for a diagnosis of KALA and treating that if possible.  He was a bit skeptical that he could use CPAP therapy but was not totally opposed to the idea.    PLAN:    Sleep hygiene/CBT-I as discussed above.  I talked about this in detail with the patient and his family and gave him information regarding pursuing this.  Home sleep apnea testing as discussed above.  The diagnostic process as well as potential treatment options for obstructive sleep apnea were discussed.  The cardiovascular and metabolic risks of untreated obstructive sleep apnea were reviewed.  The patient was potentially amenable to a trial of CPAP therapy if deemed appropriate after testing complete.  See above  Close sleep center follow-up.      I have reviewed the results of my evaluation and impression and discussed my recommendations in detail with the patient.    Signed by  Josue Coombs MD    January 16, 2024      CC: Kendrick Oconnor MD          No ref. provider found

## 2024-01-24 ENCOUNTER — OFFICE VISIT (OUTPATIENT)
Dept: CARDIOLOGY | Facility: CLINIC | Age: 89
End: 2024-01-24
Payer: MEDICARE

## 2024-01-24 VITALS
HEIGHT: 68 IN | SYSTOLIC BLOOD PRESSURE: 120 MMHG | OXYGEN SATURATION: 96 % | BODY MASS INDEX: 27.49 KG/M2 | DIASTOLIC BLOOD PRESSURE: 72 MMHG | WEIGHT: 181.4 LBS | HEART RATE: 91 BPM

## 2024-01-24 DIAGNOSIS — I50.31 ACUTE DIASTOLIC CHF (CONGESTIVE HEART FAILURE): ICD-10-CM

## 2024-01-24 DIAGNOSIS — E78.5 DYSLIPIDEMIA: ICD-10-CM

## 2024-01-24 DIAGNOSIS — I25.810 CORONARY ARTERY DISEASE INVOLVING CORONARY BYPASS GRAFT OF NATIVE HEART WITHOUT ANGINA PECTORIS: Chronic | ICD-10-CM

## 2024-01-24 DIAGNOSIS — I10 ESSENTIAL HYPERTENSION: Chronic | ICD-10-CM

## 2024-01-24 DIAGNOSIS — I49.5 SSS (SICK SINUS SYNDROME): Primary | ICD-10-CM

## 2024-01-24 RX ORDER — METOLAZONE 2.5 MG/1
2.5 TABLET ORAL AS NEEDED
Qty: 30 TABLET | Refills: 1 | Status: SHIPPED | OUTPATIENT
Start: 2024-01-24

## 2024-01-24 NOTE — PROGRESS NOTES
[facility] Cardiology  Office visit  Agapito Downey  1935  [phone]  There is no work phone number on file.    VISIT DATE:  1/24/2024    PCP: Kendrick Oconnor MD  [address]  [address]  [facility] [id number]    CC:  Chief Complaint   Patient presents with    Coronary artery disease involving coronary bypass graft of      NP     PROBLEM LIST:  Coronary artery disease:  History of myocardial infarction in 1994 with TPA and associated NSVT.  Angioplasty at that time.  Recurrent chest pain approximately 6 months later with catheterization in November 1994.  Subsequent coronary artery bypass grafting, November 1994.  Multiple stress tests and echocardiograms post procedure with most recent echocardiogram, 01/19/2011:  Ejection fraction 60% to 65% with mild diastolic dysfunction.  No evidence of ischemia on stress test.  First-degree AV block.  Benign hypertension.  Dyslipidemia.  History of prostate cancer with seed implantation.  Osteoarthritis.  Family history of heart disease.  Surgical history:  Laparoscopic cholecystectomy.  Coronary artery bypass grafting.  Prostate surgery with seed implantation.  Previous cardiac studies and procedures:  2017  Dobutamine myocardial perfusion imaging  The patient's underlying heart rhythm is second-degree AV block Mobitz type I. With dobutamine infusion, the rhythm converted to sinus tachycardia  The ECG portion of the stress test was negative for ECG and clinical evidence for ischemia.  The myocardial perfusion imaging portion of the stress test indicates a small-sized infarct located in the basal inferior lateral wall with mild tri-infarct ischemia.  Overall, impressions are consistent with a low risk study.  TTE  Left ventricular systolic function is normal. Estimated EF = 54%.  Left ventricular wall thickness is consistent with mild concentric hypertrophy.  Left ventricular diastolic dysfunction (grade I) consistent  with impaired relaxation.    January 2024 TTE    Left ventricular systolic function is normal. Calculated left ventricular EF = 52% Left ventricular ejection fraction appears to be 46 - 50%.    Left ventricular wall thickness is consistent with mild concentric hypertrophy.    Left ventricular diastolic function was indeterminate.    The right ventricular cavity is mildly dilated.    The right atrial cavity is dilated.    Moderate tricuspid valve regurgitation is present.    Estimated right ventricular systolic pressure from tricuspid regurgitation is mildly elevated (35-45 mmHg).    ASSESSMENT:   Diagnosis Plan   1. SSS (sick sinus syndrome)        2. Essential hypertension        3. Dyslipidemia        4. Coronary artery disease involving coronary bypass graft of native heart without angina pectoris            Device interrogation:  [facility] dual-chamber  Normal atrial and ventricular pacing and sensing thresholds.  Estimated battery life 6 years  Less than 1% mode switching secondary to atrial flutter, longest episode lasting 6 minutes.    PLAN:  Coronary disease: Currently stable asymptomatic.  Continue current medical therapy.  Developed epistaxis with antiplatelet agents.    Sick sinus syndrome: Continue routine follow-up in device clinic.    Paroxysmal A-fib/flutter: Limited burden of arrhythmia.  Has developed significant epistaxis with both Eliquis and low-dose aspirin.  Continue to trend burden of arrhythmia through device interrogation, will consider left atrial appendage closure if arrhythmia increases in frequency or duration.    Heart failure with reduced ejection fraction, chronic: Currently euvolemic and compensated.  Continue current medical therapy.  Adding metolazone 2.5 mg p.o. as needed for weight gain greater than 3 to 5 pounds above baseline.    Subjective  Currently denies chest discomfort, palpitations or dyspnea on exertion.  Stable daily weights ranging 1 75-1 76.  Compliant with medical  "therapy.  Blood pressures running less than 130/80 mmHg.    PHYSICAL EXAMINATION:  Vitals:    01/24/24 1434   BP: 120/72   BP Location: Right arm   Patient Position: Sitting   Cuff Size: Adult   Pulse: 91   SpO2: 96%   Weight: 82.3 kg (181 lb 6.4 oz)   Height: 172.7 cm (68\")     General Appearance:    Alert, cooperative, no distress, appears stated age   Head:    Normocephalic, without obvious abnormality, atraumatic   Eyes:    conjunctiva/corneas clear   Nose:   Nares normal, septum midline, mucosa normal, no drainage   Throat:   Lips, teeth and gums normal   Neck:   Supple, symmetrical, trachea midline, no carotid    bruit or JVD   Lungs:     Clear to auscultation bilaterally, respirations unlabored   Chest Wall:    No tenderness or deformity    Heart:    Regular rate and rhythm, S1 and S2 normal, no murmur, rub   or gallop, normal carotid impulse bilaterally without bruit.   Abdomen:     Soft, non-tender   Extremities:   Extremities normal, atraumatic, no cyanosis or edema   Pulses:   2+ and symmetric all extremities   Skin:   Skin color, texture, turgor normal, no rashes or lesions       Diagnostic Data:  Procedures  Lab Results   Component Value Date    CHLPL 79 12/18/2023    TRIG 86 12/18/2023    HDL 45 12/18/2023     Lab Results   Component Value Date    GLUCOSE 122 (H) 01/11/2024    BUN 20 01/11/2024    CREATININE 1.28 (H) 01/11/2024     01/11/2024    K 4.6 01/11/2024     01/11/2024    CO2 27.7 01/11/2024     Lab Results   Component Value Date    HGBA1C 6.20 (H) 12/18/2023     Lab Results   Component Value Date    WBC 9.76 12/20/2023    HGB 15.3 12/20/2023    HCT 48.4 12/20/2023     12/20/2023       Allergies  Allergies   Allergen Reactions    Reserpine Other (See Comments)     depression    Statins Other (See Comments)     Myalgias and mood affect disorder  fatigue         Current Medications    Current Outpatient Medications:     bisoprolol (ZEBeta) 5 MG tablet, Take 1.5 tablets by mouth " Every Night., Disp: 90 tablet, Rfl: 0    cholecalciferol (D3-50) 1.25 MG (68414 UT) capsule, Take 1 capsule by mouth 1 (One) Time Per Week., Disp: , Rfl:     empagliflozin (Jardiance) 10 MG tablet tablet, Take 1 tablet by mouth Daily., Disp: 90 tablet, Rfl: 0    Evolocumab (REPATHA) solution auto-injector SureClick injection, Inject 1 mL under the skin into the appropriate area as directed Every 14 (Fourteen) Days., Disp: , Rfl:     furosemide (LASIX) 40 MG tablet, Take 1 tablet by mouth 2 (Two) Times a Day., Disp: 60 tablet, Rfl: 0    ketotifen (ZADITOR) 0.025 % ophthalmic solution, Apply 1 drop to eye(s) as directed by provider Daily As Needed., Disp: , Rfl:     levothyroxine (SYNTHROID, LEVOTHROID) 75 MCG tablet, TAKE ONE TABLET BY MOUTH ONE TIME DAILY, Disp: 90 tablet, Rfl: 3    losartan (COZAAR) 50 MG tablet, Take 1 tablet by mouth Daily. (Patient taking differently: Take 1 tablet by mouth 2 (Two) Times a Day.), Disp: , Rfl:     magnesium oxide (MAG-OX) 400 MG tablet, Take 1 tablet by mouth Daily., Disp: , Rfl:     metFORMIN ER (GLUCOPHAGE-XR) 500 MG 24 hr tablet, TAKE ONE TABLET BY MOUTH ONE TIME DAILY, Disp: 90 tablet, Rfl: 1    Multiple Vitamins-Minerals (CENTRUM SILVER 50+MEN PO), Take 1 tablet by mouth Daily., Disp: , Rfl:     polyethyl glycol-propyl glycol (SYSTANE) 0.4-0.3 % solution ophthalmic solution, Apply 1 drop to eye(s) as directed by provider Daily As Needed., Disp: , Rfl:     potassium chloride (K-DUR,KLOR-CON) 10 MEQ CR tablet, Take 1 tablet by mouth 2 (Two) Times a Day., Disp: 60 tablet, Rfl: 1    Probiotic Product (ALIGN) 4 MG capsule, Take 1 capsule by mouth Daily., Disp: , Rfl:     psyllium (METAMUCIL) 100 % pack packet, Take 1 packet by mouth Daily., Disp: , Rfl:     spironolactone (ALDACTONE) 25 MG tablet, TAKE ONE TABLET BY MOUTH EVERY OTHER DAY (Patient taking differently: Takes on even numbered days), Disp: 45 tablet, Rfl: 5    metOLazone (ZAROXOLYN) 2.5 MG tablet, Take 1 tablet by  mouth As Needed (daily as needed for edema)., Disp: 30 tablet, Rfl: 1          ROS  ROS      SOCIAL HX  Social History     Socioeconomic History    Marital status:    Tobacco Use    Smoking status: Never     Passive exposure: Never    Smokeless tobacco: Never   Vaping Use    Vaping Use: Never used   Substance and Sexual Activity    Alcohol use: No    Drug use: No    Sexual activity: Not Currently     Partners: Female       FAMILY HX  Family History   Problem Relation Age of Onset    Heart disease Father     Coronary artery disease Brother         cause of death    Heart disease Brother     Heart disease Other              Shar Jamison III, MD, FACC

## 2024-01-26 ENCOUNTER — TELEPHONE (OUTPATIENT)
Dept: CARDIOLOGY | Facility: CLINIC | Age: 89
End: 2024-01-26

## 2024-01-26 NOTE — TELEPHONE ENCOUNTER
Name: LEO PIEDRA    Relationship: Emergency Contact    Best Callback Number: [phone]    Incoming call to the Hub, requesting to  Reschedule their Device Check appointment on 09/23/24.     Per Hub workflow, further review is needed before the task can be completed.    Result of Call: Please reach out to the patient to reschedule    PATIENT WIFE WOULD LIKE TO RESCHEDULE DUE TO APPOINTMENT IS BOOKED FOR 9 MONTHS OUT AND THEY WOULD LIKE PATIENT TO GET IN WITHIN THE 6 MONTHS TO FOLLOW UP. PATIENT CAN COME IN ANY OTHER DAY BUT WEDNESDAY AFTER 10:00 AM.

## 2024-01-29 ENCOUNTER — HOSPITAL ENCOUNTER (OUTPATIENT)
Dept: SLEEP MEDICINE | Facility: HOSPITAL | Age: 89
Discharge: HOME | End: 2024-01-29
Admitting: INTERNAL MEDICINE
Payer: MEDICARE

## 2024-01-29 DIAGNOSIS — G47.19 EXCESSIVE DAYTIME SLEEPINESS: ICD-10-CM

## 2024-01-29 DIAGNOSIS — R06.83 SNORING: ICD-10-CM

## 2024-01-29 DIAGNOSIS — G47.33 OBSTRUCTIVE SLEEP APNEA, ADULT: ICD-10-CM

## 2024-01-29 PROCEDURE — 95800 SLP STDY UNATTENDED: CPT

## 2024-01-31 DIAGNOSIS — R06.83 SNORING: ICD-10-CM

## 2024-01-31 DIAGNOSIS — I10 ESSENTIAL HYPERTENSION: Chronic | ICD-10-CM

## 2024-01-31 DIAGNOSIS — G47.33 OBSTRUCTIVE SLEEP APNEA, ADULT: Primary | ICD-10-CM

## 2024-02-16 DIAGNOSIS — I50.31 ACUTE DIASTOLIC CHF (CONGESTIVE HEART FAILURE): ICD-10-CM

## 2024-02-16 RX ORDER — FUROSEMIDE 40 MG/1
40 TABLET ORAL 2 TIMES DAILY
Qty: 60 TABLET | Refills: 5 | Status: SHIPPED | OUTPATIENT
Start: 2024-02-16

## 2024-02-21 ENCOUNTER — TELEPHONE (OUTPATIENT)
Dept: INTERNAL MEDICINE | Facility: CLINIC | Age: 89
End: 2024-02-21
Payer: MEDICARE

## 2024-02-21 NOTE — TELEPHONE ENCOUNTER
PT CALLED AND STATED HE REQUESTED HIS VITAMIN D-3 MEDICATION LAST WEEK AND REQUESTED IT TO BE SENT [facility] IN [facility].  THEY DON'T HAVE THIS MEDICATION.    PER PT.....THIS IS THE ONLY MEDICATION THAT IS TO GO TO [facility] AT THIS TIME.

## 2024-03-13 ENCOUNTER — OFFICE VISIT (OUTPATIENT)
Dept: INTERNAL MEDICINE | Facility: CLINIC | Age: 89
End: 2024-03-13
Payer: MEDICARE

## 2024-03-13 VITALS
SYSTOLIC BLOOD PRESSURE: 118 MMHG | TEMPERATURE: 97.8 F | DIASTOLIC BLOOD PRESSURE: 80 MMHG | HEIGHT: 68 IN | WEIGHT: 182 LBS | HEART RATE: 60 BPM | BODY MASS INDEX: 27.58 KG/M2

## 2024-03-13 DIAGNOSIS — I10 ESSENTIAL HYPERTENSION: Chronic | ICD-10-CM

## 2024-03-13 DIAGNOSIS — E03.9 ACQUIRED HYPOTHYROIDISM: ICD-10-CM

## 2024-03-13 DIAGNOSIS — E11.42 DIABETIC POLYNEUROPATHY ASSOCIATED WITH TYPE 2 DIABETES MELLITUS: Primary | ICD-10-CM

## 2024-03-13 LAB
EXPIRATION DATE: ABNORMAL
HBA1C MFR BLD: 6.6 % (ref 4.5–5.7)
Lab: ABNORMAL

## 2024-03-13 NOTE — PROGRESS NOTES
"[facility]     Agapito Downey  1935   [id number]      Patient Care Team:  Kendrick Oconnor MD as PCP - General  Federico Campbell MD as Consulting Physician (Cardiology)  Shar Jamison III, MD as Cardiologist (Cardiology)    Chief Complaint::   Chief Complaint   Patient presents with    Diabetic polyneuropathy associated with type 2 diabetes víctor     2 mo. Follow up    Congestive Heart Failure    Sleep results        HPI  The patient is an 88-year-old male who comes in for follow-up of diabetes, hypertension, hypothyroidism, and dyslipidemia. He continues to see cardiology for coronary artery disease, sick sinus syndrome, and heart failure. He is accompanied by an adult female.    Sinus concerns   The patient has been self-administering a nasal antihistamine and a steroid, both in the morning and evening, to manage what he believes to be allergy symptoms. He reports a sweet taste in his throat after using the nasal antihistamine, which he alleviates by drinking fluids. Despite these treatments, his symptoms persist. He experiences congestion when exposed to cold air and uses Vicks at bedtime to alleviate this. He also reports postnasal drainage. His cold symptoms began in early 02/2024, and he has struggled with persistent drainage and cough. He has tried various over-the-counter medications, including Mucinex, NyQuil, and Robitussin, with varying degrees of success and side effects.    Sleep Study  The patient underwent a home sleep study and had a upcoming follow-up appointment with a sleep specialist on 04/25/2024. The data indicated that he sleeps on his back part of the time. He reported a sleep score of 12.1 out of 100 but expressed skepticism about the data's accuracy.    Anxiety  The patient experienced anxiety following an event referred to as \"NAZANIN,\" but this has since resolved.    Nosebleeds  The patient has a history of nosebleeds, which have ceased as long as he " avoids blood thinners, including aspirin. He has undergone nasal cauterization twice. He continues to sleep on an inclined pillow.    Congestive Heart Failure  The patient suspects the onset of congestive heart failure. However, he reports that his leg swelling resolved after taking a second anxiety medication, as observed by an adult female.     Diabetes mellitus  He is taking Jardiance and metformin for diabetes management.    Immunizations  He received his RSV vaccine in 01/2020. His last COVID-19 vaccine was in 10/2023.    Chronic Conditions:  Diabetes, hypertension, hypothyroidism, and dyslipidemia. He continues to see cardiology for coronary artery disease, sick sinus syndrome, and heart failure    Patient Active Problem List   Diagnosis    Coronary artery disease involving coronary bypass graft of native heart without angina pectoris    AV block, 1st degree    Essential hypertension    Dyslipidemia    Osteoarthritis    Other fatigue    Allergic rhinitis    ASHD (arteriosclerotic heart disease)    Angina pectoris    History of malignant neoplasm of prostate    Snoring    Irritable bowel syndrome    Mobitz type 1 second degree AV block    SSS (sick sinus syndrome)    Acquired hypothyroidism    Diabetic polyneuropathy associated with type 2 diabetes mellitus    Chronic insomnia        Past Medical History:   Diagnosis Date    Anxiety Aug 2023    AV bloc first degree     CAD (coronary artery disease)     Cancer     prostate cancer seed implantion; Brachytherapy 2006, PSA has since been undetectable    CHF (congestive heart failure) Dec 2023    Cholelithiasis ?    Diverticulosis     Dyslipidemia     Hyperlipidemia     Hypertension     benign    Myocardial infarction 1994    Nephrolithiasis 09/20/2014    Osteoarthritis        Past Surgical History:   Procedure Laterality Date    CARDIAC ELECTROPHYSIOLOGY PROCEDURE N/A 12/04/2019    Procedure: Device Implant     PPM IMPLANT;  Surgeon: Federico Campbell MD;   Location: [facility] CATH INVASIVE LOCATION;  Service: Cardiology    CHOLECYSTECTOMY  1995    laparoscopic    CORONARY ARTERY BYPASS GRAFT  11/1994    INSERT / REPLACE / REMOVE PACEMAKER      INSERTION PROSTATE RADIATION SEED  2006    Cancer, prostate; PSA has since been undetectable    OTHER SURGICAL HISTORY      Prostate surgery with seed implantation.    TONSILLECTOMY  1943       Family History   Problem Relation Age of Onset    Heart disease Father     Coronary artery disease Brother         cause of death    Heart disease Brother     Heart disease Other        Social History     Socioeconomic History    Marital status:    Tobacco Use    Smoking status: Never     Passive exposure: Never    Smokeless tobacco: Never   Vaping Use    Vaping status: Never Used   Substance and Sexual Activity    Alcohol use: No    Drug use: No    Sexual activity: Not Currently     Partners: Female       Allergies   Allergen Reactions    Reserpine Other (See Comments)     depression    Statins Other (See Comments)     Myalgias and mood affect disorder  fatigue           Current Outpatient Medications:     cholecalciferol (D3-50) 1.25 MG (26847 UT) capsule, Take 1 capsule by mouth 1 (One) Time Per Week., Disp: 12 capsule, Rfl: 1    empagliflozin (Jardiance) 10 MG tablet tablet, Take 1 tablet by mouth Daily., Disp: 90 tablet, Rfl: 0    Evolocumab (REPATHA) solution auto-injector SureClick injection, Inject 1 mL under the skin into the appropriate area as directed Every 14 (Fourteen) Days., Disp: , Rfl:     furosemide (LASIX) 40 MG tablet, Take 1 tablet by mouth 2 (Two) Times a Day., Disp: 60 tablet, Rfl: 5    ketotifen (ZADITOR) 0.025 % ophthalmic solution, Apply 1 drop to eye(s) as directed by provider Daily As Needed., Disp: , Rfl:     levothyroxine (SYNTHROID, LEVOTHROID) 75 MCG tablet, TAKE ONE TABLET BY MOUTH ONE TIME DAILY, Disp: 90 tablet, Rfl: 3    losartan (COZAAR) 50 MG tablet, Take 1 tablet by mouth Daily. (Patient taking  differently: Take 1 tablet by mouth 2 (Two) Times a Day.), Disp: , Rfl:     magnesium oxide (MAG-OX) 400 MG tablet, Take 1 tablet by mouth Daily., Disp: , Rfl:     metFORMIN ER (GLUCOPHAGE-XR) 500 MG 24 hr tablet, TAKE ONE TABLET BY MOUTH ONE TIME DAILY, Disp: 90 tablet, Rfl: 1    metOLazone (ZAROXOLYN) 2.5 MG tablet, Take 1 tablet by mouth As Needed (daily as needed for edema)., Disp: 30 tablet, Rfl: 1    Multiple Vitamins-Minerals (CENTRUM SILVER 50+MEN PO), Take 1 tablet by mouth Daily., Disp: , Rfl:     polyethyl glycol-propyl glycol (SYSTANE) 0.4-0.3 % solution ophthalmic solution, Apply 1 drop to eye(s) as directed by provider Daily As Needed., Disp: , Rfl:     potassium chloride (K-DUR,KLOR-CON) 10 MEQ CR tablet, Take 1 tablet by mouth 2 (Two) Times a Day., Disp: 60 tablet, Rfl: 1    Probiotic Product (ALIGN) 4 MG capsule, Take 1 capsule by mouth Daily., Disp: , Rfl:     psyllium (METAMUCIL) 100 % pack packet, Take 1 packet by mouth Daily., Disp: , Rfl:     spironolactone (ALDACTONE) 25 MG tablet, TAKE ONE TABLET BY MOUTH EVERY OTHER DAY (Patient taking differently: Takes on even numbered days), Disp: 45 tablet, Rfl: 5    bisoprolol (ZEBeta) 5 MG tablet, Take 1.5 tablets by mouth Every Night., Disp: 135 tablet, Rfl: 0    Review of Systems   Constitutional:  Negative for chills, fatigue and fever.   HENT:  Positive for congestion and postnasal drip. Negative for ear pain and sinus pressure.    Respiratory:  Positive for cough. Negative for chest tightness, shortness of breath and wheezing.    Cardiovascular:  Negative for chest pain and palpitations.   Gastrointestinal:  Negative for abdominal pain, blood in stool and constipation.   Skin:  Negative for color change.   Allergic/Immunologic: Negative for environmental allergies.   Neurological:  Negative for dizziness, speech difficulty and headache.   Psychiatric/Behavioral:  Negative for decreased concentration. The patient is not nervous/anxious.      "    Vital Signs  Vitals:    03/13/24 1235   BP: 118/80   BP Location: Left arm   Patient Position: Sitting   Cuff Size: Adult   Pulse: 60   Temp: 97.8 °F (36.6 °C)   TempSrc: Temporal   Weight: 82.6 kg (182 lb)   Height: 172.7 cm (67.99\")       Physical Exam  Vitals reviewed.   Constitutional:       Appearance: He is well-developed.   HENT:      Head: Normocephalic and atraumatic.   Neck:      Thyroid: No thyromegaly.      Vascular: No carotid bruit.   Cardiovascular:      Rate and Rhythm: Normal rate and regular rhythm.      Heart sounds: Normal heart sounds. No murmur heard.     No friction rub. No gallop.   Pulmonary:      Effort: Pulmonary effort is normal.      Breath sounds: Normal breath sounds.   Musculoskeletal:      Cervical back: Normal range of motion and neck supple.   Skin:     General: Skin is warm and dry.   Neurological:      Mental Status: He is oriented to person, place, and time. Mental status is at baseline.      Cranial Nerves: No cranial nerve deficit.          Procedures    ACE III MINI             Assessment/Plan:    Diagnoses and all orders for this visit:    1. Diabetic polyneuropathy associated with type 2 diabetes mellitus (Primary)  -     POC Glycosylated Hemoglobin (Hb A1C)    2. Essential hypertension    3. Acquired hypothyroidism      1. Diabetes.  His A1c is pending. His diabetes has always been well controlled with metformin alone. Now, Jardiance has been added for heart failure. If A1c is no worse than it has been, we will discontinue metformin and see how he does on Jardiance alone.    2. Hypertension.  Blood pressure is well controlled on bisoprolol and losartan.    3. Hypothyroidism.  He remains clinically euthyroid on current dose of levothyroxine.     4. Heart failure.  Heart failure is now well compensated on spironolactone, metolazone, furosemide, losartan, and Jardiance. He will follow up with cardiology.    Plan of care reviewed with patient at the conclusion of today's " visit. Education was provided regarding diagnosis, management, and any prescribed or recommended OTC medications.Patient verbalizes understanding of and agreement with management plan.       Transcribed from ambient dictation for Kendrick Oconnor MD by Layo Garcia .  03/13/24   14:40 EDT    Patient or patient representative verbalized consent to the visit recording.  I have personally performed the services described in this document as transcribed by the above individual, and it is both accurate and complete.

## 2024-03-14 DIAGNOSIS — I10 ESSENTIAL HYPERTENSION: ICD-10-CM

## 2024-03-14 RX ORDER — BISOPROLOL FUMARATE 5 MG/1
7.5 TABLET, FILM COATED ORAL NIGHTLY
Qty: 135 TABLET | Refills: 0 | Status: SHIPPED | OUTPATIENT
Start: 2024-03-14

## 2024-03-14 NOTE — TELEPHONE ENCOUNTER
Last seen on 1/11/24; refills sent to [facility] for 90 days per pharmacy request.     Emma Rangel, PharmD

## 2024-03-14 NOTE — PROGRESS NOTES
[facility]     Agapito Downey  1935   [id number]      Patient Care Team:  Kendrick Oconnor MD as PCP - General  Federico Campbell MD as Consulting Physician (Cardiology)    Chief Complaint::   Chief Complaint   Patient presents with    Hypertension    Diabetes    Hyperlipidemia        HPI  The patient comes in for follow-up of anxiety as well as diabetes and hypertension.     Anxiety  He is doing well. He met with KIRAN Orlando, on 01/11/2024. He is scheduled to see Dr. Jamison next week. He slept in bed for the first time in quite a while. He did not sleep all night, but he slept a good portion of it. His anxiety has subsided. He has an appointment to see a sleep doctor, Dr. Wallace. He is unsure if he can wear a CPAP machine due to claustrophobia. His breathing is stable.    Heart failure  He had heart failure last month. His weight was 199 pounds when he went into the emergency room. He was consuming very small amounts of food. He states the echocardiogram showed no fluid around his heart. He denies any weakness; however, he is stiff. His weight is down to 183 pounds. His ankles have improved since yesterday, 1/11/2024.     Chronic Conditions:  Anxiety, diabetes, and hypertension.    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    Communication with other providers: Cotx with [facility]/student. Spoke with nurse about pt's ability to use bedside commode. Placed commode in room for use at end of session.     Therapeutic Activities/Neuro Re-Education/Gait Training   3/11/24 3/12/24 3/13/24 3/14/24   Bed   Mobility Supine>sit Min A for trunk support, pt able to get LEs to EOB and reach for bed rail. HHA for trunk support Supine>sit Min A for trunk support, pt able to get LEs to EOB and reach for bed rail. HHA for trunk support SBA sit>supine with cues for sequencing,  Mod A scooting toward HOB Supine>sit Min A for trunk support, pt able to get LEs to EOB and reach for bed rail. HHA for trunk support   STS   Transfer Mod A STS at EOB  Max A ascending from w/c with cues for UE assist, to RW Mod-Max A at EOB and w/c, cues required for hand placement and eccentric control Max A x2 standing from recliner  Mod A x2 standing from w/c 2x  Min A x2 standing from nustep.  On stand>sit pt requires cues for hand placement and eccentric control. Sit>stand pt able to verbalize hand placement and forward lean prior to transfers Jose standing from EOB. Cues for hand placement and eccentric control   Stand Pivot transfer   W/c<>nustep and w/c>EOB with RW and CGA-Min A for balance support and cues for sequencing x   Sitting  Balance Initially Mod A at EOB upon sitting up due to posterior LOB. With assist to sit forward, pt able to maintain seated position with CGA-SBA x5min CGA-SBA this date at EOB and while in shower with dynamic tasks x20 min with 0-1UE support Good  SBA at EOB to abhay pants and socks   Standing Balance   CGA-Mod A during amb, see     Cancer     prostate cancer seed implantion; Brachytherapy 2006, PSA has since been undetectable    CHF (congestive heart failure) Dec 2023    Cholelithiasis ?    Diverticulosis     Dyslipidemia     Hyperlipidemia     Hypertension     benign    Myocardial infarction 1994    Nephrolithiasis 09/20/2014    Osteoarthritis        Past Surgical History:   Procedure Laterality Date    CARDIAC ELECTROPHYSIOLOGY PROCEDURE N/A 12/4/2019    Procedure: Device Implant     PPM IMPLANT;  Surgeon: Federico Campbell MD;  Location: [facility] INVASIVE LOCATION;  Service: Cardiology    CHOLECYSTECTOMY  1995    laparoscopic    CORONARY ARTERY BYPASS GRAFT  11/1994    INSERTION PROSTATE RADIATION SEED  2006    Cancer, prostate; PSA has since been undetectable    OTHER SURGICAL HISTORY      Prostate surgery with seed implantation.    TONSILLECTOMY  1943       Family History   Problem Relation Age of Onset    Heart disease Father     Coronary artery disease Brother         cause of death    Heart disease Brother     Heart disease Other        Social History     Socioeconomic History    Marital status:    Tobacco Use    Smoking status: Never    Smokeless tobacco: Never   Vaping Use    Vaping Use: Never used   Substance and Sexual Activity    Alcohol use: No    Drug use: No    Sexual activity: Not Currently     Partners: Female       Allergies   Allergen Reactions    Reserpine Other (See Comments)     depression    Statins Other (See Comments)     Myalgias and mood affect disorder  fatigue           Current Outpatient Medications:     aspirin 81 MG EC tablet, Take 1 tablet by mouth Daily., Disp: , Rfl:     bisoprolol (ZEBeta) 5 MG tablet, Take 1.5 tablets by mouth Every Night., Disp: 90 tablet, Rfl: 0    cholecalciferol (D3-50) 1.25 MG (06103 UT) capsule, Take 1 capsule by mouth 1 (One) Time Per Week., Disp: , Rfl:     Evolocumab (REPATHA) solution auto-injector SureClick injection, Inject 1 mL under the skin into the  appropriate area as directed Every 14 (Fourteen) Days., Disp: , Rfl:     furosemide (LASIX) 40 MG tablet, Take 1 tablet by mouth 2 (Two) Times a Day., Disp: 60 tablet, Rfl: 0    ketotifen (ZADITOR) 0.025 % ophthalmic solution, Apply 1 drop to eye(s) as directed by provider Daily As Needed., Disp: , Rfl:     levothyroxine (SYNTHROID, LEVOTHROID) 75 MCG tablet, TAKE ONE TABLET BY MOUTH ONE TIME DAILY, Disp: 90 tablet, Rfl: 3    losartan (COZAAR) 50 MG tablet, Take 1 tablet by mouth Daily. (Patient taking differently: Take 1 tablet by mouth 2 (Two) Times a Day.), Disp: , Rfl:     magnesium oxide (MAG-OX) 400 MG tablet, Take 1 tablet by mouth Daily., Disp: , Rfl:     metFORMIN ER (GLUCOPHAGE-XR) 500 MG 24 hr tablet, TAKE ONE TABLET BY MOUTH ONE TIME DAILY, Disp: 90 tablet, Rfl: 1    Multiple Vitamins-Minerals (CENTRUM SILVER 50+MEN PO), Take 1 tablet by mouth Daily., Disp: , Rfl:     polyethyl glycol-propyl glycol (SYSTANE) 0.4-0.3 % solution ophthalmic solution, Apply 1 drop to eye(s) as directed by provider Daily As Needed., Disp: , Rfl:     potassium chloride (K-DUR,KLOR-CON) 10 MEQ CR tablet, Take 1 tablet by mouth 2 (Two) Times a Day., Disp: 60 tablet, Rfl: 1    Probiotic Product (ALIGN) 4 MG capsule, Take 1 capsule by mouth Daily., Disp: , Rfl:     psyllium (METAMUCIL) 100 % pack packet, Take 1 packet by mouth Daily., Disp: , Rfl:     spironolactone (ALDACTONE) 25 MG tablet, TAKE ONE TABLET BY MOUTH EVERY OTHER DAY (Patient taking differently: Takes on even numbered days), Disp: 45 tablet, Rfl: 5    empagliflozin (Jardiance) 10 MG tablet tablet, Take 1 tablet by mouth Daily. (Patient not taking: Reported on 1/12/2024), Disp: 90 tablet, Rfl: 0    Review of Systems   Constitutional:  Negative for chills, fatigue and fever.   HENT:  Negative for congestion, ear pain and sinus pressure.    Respiratory:  Negative for cough, chest tightness, shortness of breath and wheezing.    Cardiovascular:  Negative for chest pain  "and palpitations.   Gastrointestinal:  Negative for abdominal pain, blood in stool and constipation.   Skin:  Negative for color change.   Allergic/Immunologic: Negative for environmental allergies.   Neurological:  Negative for dizziness, speech difficulty and headache.   Psychiatric/Behavioral:  Negative for decreased concentration. The patient is not nervous/anxious.         Vital Signs  Vitals:    01/12/24 1049   BP: 126/74   BP Location: Left arm   Patient Position: Sitting   Cuff Size: Adult   Pulse: 68   Temp: 97.7 °F (36.5 °C)   Weight: 83.2 kg (183 lb 6.4 oz)   Height: 170.2 cm (67.01\")   PainSc: 0-No pain       Physical Exam  Vitals reviewed.   Constitutional:       Appearance: He is well-developed.   HENT:      Head: Normocephalic and atraumatic.   Cardiovascular:      Rate and Rhythm: Normal rate and regular rhythm.      Heart sounds: Normal heart sounds. No murmur heard.  Pulmonary:      Effort: Pulmonary effort is normal.      Breath sounds: Normal breath sounds.      Comments: There are faint rales at the left base.  Musculoskeletal:      Right lower leg: No edema.      Left lower leg: No edema.   Skin:     Comments: No edema.   Neurological:      Mental Status: He is alert and oriented to person, place, and time.          Procedures    ACE III MINI             Assessment/Plan:    Diagnoses and all orders for this visit:    1. Diabetic polyneuropathy associated with type 2 diabetes mellitus (Primary)    2. Chronic diastolic (congestive) heart failure    3. Essential hypertension        1. Heart failure with preserved ejection fraction  - Well compensated after diuresis. His weight is down nearly 15 pounds. It is interesting to consider whether the difficulty sleeping at night was a subtle symptom of heart failure rather than anxiety. In any case, his anxiety now appears to be controlled. He will follow up with Dr. Jamison next week. I encouraged him to go ahead and begin Jardiance. Also agreed with " cardiology about the increased dose of bisoprolol.    Follow up in 03/2024.    Plan of care reviewed with patient at the conclusion of today's visit. Education was provided regarding diagnosis, management, and any prescribed or recommended OTC medications.Patient verbalizes understanding of and agreement with management plan.         Kendrick Oconnor MD     Transcribed from ambient dictation for Kendrick Oconnor MD by Heaven Hull.  01/12/24   12:46 EST    Patient or patient representative verbalized consent to the visit recording.  I have personally performed the services described in this document as transcribed by the above individual, and it is both accurate and complete.

## 2024-03-18 DIAGNOSIS — I50.31 ACUTE DIASTOLIC CHF (CONGESTIVE HEART FAILURE): ICD-10-CM

## 2024-03-18 RX ORDER — POTASSIUM CHLORIDE 750 MG/1
10 TABLET, EXTENDED RELEASE ORAL 2 TIMES DAILY
Qty: 60 TABLET | Refills: 1 | Status: SHIPPED | OUTPATIENT
Start: 2024-03-18

## 2024-03-18 NOTE — TELEPHONE ENCOUNTER
Last seen on 1/11/24; patient to continue furosemide and potassium. Refill for potassium sent to [address]     Emma Rangel, NegraD

## 2024-03-22 RX ORDER — METFORMIN HYDROCHLORIDE 500 MG/1
500 TABLET, EXTENDED RELEASE ORAL DAILY
Qty: 90 TABLET | Refills: 1 | Status: SHIPPED | OUTPATIENT
Start: 2024-03-22

## 2024-03-22 NOTE — TELEPHONE ENCOUNTER
"Caller: Agapito Downeydarnells \"Juan\"    Relationship: Self    Best call back number: [phone]     Requested Prescriptions:   Requested Prescriptions     Pending Prescriptions Disp Refills    metFORMIN ER (GLUCOPHAGE-XR) 500 MG 24 hr tablet 90 tablet 1     Sig: Take 1 tablet by mouth Daily.        Pharmacy where request should be sent: [facility] [id number] [address] [place]  - [phone]  - [phone] FX     Last office visit with prescribing clinician: 3/13/2024   Last telemedicine visit with prescribing clinician: Visit date not found   Next office visit with prescribing clinician: 6/25/2024     Additional details provided by patient:     Does the patient have less than a 3 day supply:  [] Yes  [x] No    Would you like a call back once the refill request has been completed: [x] Yes [] No    If the office needs to give you a call back, can they leave a voicemail: [x] Yes [] No    Donta Jean Rep   03/22/24 12:43 EDT   "

## 2024-03-25 ENCOUNTER — OFFICE VISIT (OUTPATIENT)
Dept: SLEEP MEDICINE | Facility: HOSPITAL | Age: 89
End: 2024-03-25
Payer: MEDICARE

## 2024-03-25 VITALS
WEIGHT: 184 LBS | OXYGEN SATURATION: 95 % | DIASTOLIC BLOOD PRESSURE: 60 MMHG | BODY MASS INDEX: 27.89 KG/M2 | HEIGHT: 68 IN | SYSTOLIC BLOOD PRESSURE: 126 MMHG | HEART RATE: 83 BPM

## 2024-03-25 DIAGNOSIS — F51.04 CHRONIC INSOMNIA: Primary | ICD-10-CM

## 2024-03-25 DIAGNOSIS — R06.83 SNORING: ICD-10-CM

## 2024-03-25 DIAGNOSIS — G47.19 EXCESSIVE DAYTIME SLEEPINESS: ICD-10-CM

## 2024-03-25 DIAGNOSIS — G47.33 OSA (OBSTRUCTIVE SLEEP APNEA): ICD-10-CM

## 2024-03-25 PROCEDURE — 1160F RVW MEDS BY RX/DR IN RCRD: CPT | Performed by: INTERNAL MEDICINE

## 2024-03-25 PROCEDURE — 1159F MED LIST DOCD IN RCRD: CPT | Performed by: INTERNAL MEDICINE

## 2024-03-25 PROCEDURE — 99214 OFFICE O/P EST MOD 30 MIN: CPT | Performed by: INTERNAL MEDICINE

## 2024-03-25 NOTE — PROGRESS NOTES
Subjective:     Chief Complaint:   Chief Complaint   Patient presents with    Follow-up     Sleep results       HPI:    Agapito Downey is a 88 y.o. male here for follow-up of obstructive sleep apnea    I saw him in initial consultation on January 16 of this year.  He had signs and symptoms that would suggest obstructive sleep apnea including frequent nocturnal awakenings snoring and nonrestorative sleep.  He had comorbidities of hypertension and diastolic heart failure.  He also had evidence of sleep maintenance insomnia.    I gave him information on cognitive behavioral therapy and he has responded well to this with an improvement in his frequent nocturnal awakenings.    He underwent a home sleep apnea test on 1/29/2024 which revealed a mild degree of obstructive sleep apnea with an overall AHI of 12.1.    He was not convinced that he wanted to try CPAP therapy as I suggested and he is here for a further discussion and review of testing results with his wife and son    Virgilina Scale is: 7/24    Current medications are:   Current Outpatient Medications:     bisoprolol (ZEBeta) 5 MG tablet, Take 1.5 tablets by mouth Every Night., Disp: 135 tablet, Rfl: 0    cholecalciferol (D3-50) 1.25 MG (53047 UT) capsule, Take 1 capsule by mouth 1 (One) Time Per Week., Disp: 12 capsule, Rfl: 1    empagliflozin (Jardiance) 10 MG tablet tablet, Take 1 tablet by mouth Daily., Disp: 90 tablet, Rfl: 0    Evolocumab (REPATHA) solution auto-injector SureClick injection, Inject 1 mL under the skin into the appropriate area as directed Every 14 (Fourteen) Days., Disp: , Rfl:     furosemide (LASIX) 40 MG tablet, Take 1 tablet by mouth 2 (Two) Times a Day., Disp: 60 tablet, Rfl: 5    ketotifen (ZADITOR) 0.025 % ophthalmic solution, Apply 1 drop to eye(s) as directed by provider Daily As Needed., Disp: , Rfl:     levothyroxine (SYNTHROID, LEVOTHROID) 75 MCG tablet, TAKE ONE TABLET BY MOUTH ONE TIME DAILY, Disp: 90 tablet, Rfl: 3     losartan (COZAAR) 50 MG tablet, Take 1 tablet by mouth Daily. (Patient taking differently: Take 1 tablet by mouth 2 (Two) Times a Day.), Disp: , Rfl:     magnesium oxide (MAG-OX) 400 MG tablet, Take 1 tablet by mouth Daily., Disp: , Rfl:     metFORMIN ER (GLUCOPHAGE-XR) 500 MG 24 hr tablet, Take 1 tablet by mouth Daily., Disp: 90 tablet, Rfl: 1    Multiple Vitamins-Minerals (CENTRUM SILVER 50+MEN PO), Take 1 tablet by mouth Daily., Disp: , Rfl:     polyethyl glycol-propyl glycol (SYSTANE) 0.4-0.3 % solution ophthalmic solution, Apply 1 drop to eye(s) as directed by provider Daily As Needed., Disp: , Rfl:     potassium chloride (KLOR-CON M10) 10 MEQ CR tablet, Take 1 tablet by mouth 2 (Two) Times a Day., Disp: 60 tablet, Rfl: 1    Probiotic Product (ALIGN) 4 MG capsule, Take 1 capsule by mouth Daily., Disp: , Rfl:     psyllium (METAMUCIL) 100 % pack packet, Take 1 packet by mouth Daily., Disp: , Rfl:     spironolactone (ALDACTONE) 25 MG tablet, TAKE ONE TABLET BY MOUTH EVERY OTHER DAY (Patient taking differently: Takes on even numbered days), Disp: 45 tablet, Rfl: 5    metOLazone (ZAROXOLYN) 2.5 MG tablet, Take 1 tablet by mouth As Needed (daily as needed for edema)., Disp: 30 tablet, Rfl: 1.      The patient's relevant past medical, surgical, family and social history were reviewed and updated in Epic as appropriate.     ROS:    Review of Systems      Objective:    Physical Exam  Vitals reviewed.   Constitutional:       Appearance: He is well-developed.   HENT:      Head: Normocephalic and atraumatic.      Mouth/Throat:      Mouth: Mucous membranes are moist.      Pharynx: Oropharynx is clear.      Comments: Class IV airway  Neck:      Thyroid: No thyromegaly.   Cardiovascular:      Rate and Rhythm: Normal rate and regular rhythm.      Heart sounds: No murmur heard.     No friction rub. No gallop.   Pulmonary:      Effort: Pulmonary effort is normal. No respiratory distress.      Breath sounds: No wheezing or rales.    Musculoskeletal:      Cervical back: Neck supple.   Skin:     General: Skin is warm and dry.   Neurological:      Mental Status: He is alert and oriented to person, place, and time.   Psychiatric:         Behavior: Behavior normal.         Thought Content: Thought content normal.         DATA:      Assessment:    Problem List Items Addressed This Visit          Pulmonary Problems    Snoring    KALA (obstructive sleep apnea)       Other    Chronic insomnia - Primary     Other Visit Diagnoses       Excessive daytime sleepiness                88-year-old male with a combination of chronic sleep maintenance insomnia and obstructive sleep apnea.  He has responded well to CBT-I but given his cardiovascular comorbidities and what I think is some degree of daytime somnolence I suggested a trial of CPAP therapy.  I went over his study in detail and answered any questions regarding the pros and cons of CPAP therapy and in the end he was agreeable to proceed and I think is well-informed.    Plan:     Auto CPAP 8-18 cm H2O  We discussed mask options and mask fitting as well as adjusting to CPAP  He will follow-up between 30 and 90 days of CPAP initiation for review of his clinical response and download    Discussed in detail with the patient.  He will call prior to his follow up visit for any new problems.    Level of service justified based on 32 minutes spent in patient care on this date of service including, but not limited to: preparing to see the patient, obtaining and/or reviewing history, performing medically appropriate examination, ordering tests/medicine/procedures, independently interpreting results, documenting clinical information in EHR, and counseling/education of patient/family/caregiver.  This is exclusive of time spent on other separate services such as performing procedures or test interpretation, if applicable.  (Level 4 30-39 minutes; Level 5 40-54 minutes)    Signed by  Josue Coombs MD

## 2024-04-08 DIAGNOSIS — I50.32 CHRONIC HEART FAILURE WITH PRESERVED EJECTION FRACTION (HFPEF): ICD-10-CM

## 2024-04-08 NOTE — TELEPHONE ENCOUNTER
"    Caller: Agapito Downey Mercedes \"Juan\"    Relationship: Self    Best call back number: [phone]    Requested Prescriptions:   Requested Prescriptions     Pending Prescriptions Disp Refills    empagliflozin (Jardiance) 10 MG tablet tablet 90 tablet 0     Sig: Take 1 tablet by mouth Daily.        Pharmacy where request should be sent: [facility] [id number] [address] [place] [address] - [phone]  - [phone] FX     Last office visit with prescribing clinician: 1/11/2024   Last telemedicine visit with prescribing clinician: 12/29/2023   Next office visit with prescribing clinician: Visit date not found       Does the patient have less than a 3 day supply:  [] Yes  [x] No    Would you like a call back once the refill request has been completed: [] Yes [] No    If the office needs to give you a call back, can they leave a voicemail: [] Yes [] No    Donta Weathers Rep   04/08/24 09:20 EDT         "

## 2024-04-10 DIAGNOSIS — I50.31 ACUTE DIASTOLIC CHF (CONGESTIVE HEART FAILURE): ICD-10-CM

## 2024-04-10 RX ORDER — POTASSIUM CHLORIDE 750 MG/1
10 TABLET, EXTENDED RELEASE ORAL 2 TIMES DAILY
Qty: 60 TABLET | Refills: 1 | Status: SHIPPED | OUTPATIENT
Start: 2024-04-10

## 2024-06-08 ENCOUNTER — TELEPHONE (OUTPATIENT)
Dept: SLEEP MEDICINE | Age: 89
End: 2024-06-08
Payer: MEDICARE

## 2024-06-08 NOTE — TELEPHONE ENCOUNTER
ANAYA, CAN YOU CHECK WITH PATIENT TO CONFIRM IF HE IS USING MACHINE?   I PULLED COMPLIANCE AND IT DOES NOT SHOW ANY CONSISTENT DATA.      PATIENT MAY NEED TO DISCUSS WHAT'S GOING ON  AND WHY HE'S NOT USING DEVICE OR IF HE WANTS TO DISCUSS OTHER THERAPY OPTIONS.      WE MAY NEED TO CANCEL IF PATIENT DOES NOT HAVE A REASON TO BE SEEN.  THANKS.

## 2024-06-11 DIAGNOSIS — I10 ESSENTIAL HYPERTENSION: ICD-10-CM

## 2024-06-11 NOTE — PROGRESS NOTES
Sleep Clinic Follow Up Note    Chief Complaint  Snoring    Subjective     History of Present Illness (from previous encounter on 3/25/2024 with Dr. Coombs):  Agapito Downey is a 88 y.o. male here for follow-up of obstructive sleep apnea     I saw him in initial consultation on January 16 of this year.  He had signs and symptoms that would suggest obstructive sleep apnea including frequent nocturnal awakenings snoring and nonrestorative sleep.  He had comorbidities of hypertension and diastolic heart failure.  He also had evidence of sleep maintenance insomnia.     I gave him information on cognitive behavioral therapy and he has responded well to this with an improvement in his frequent nocturnal awakenings.     He underwent a home sleep apnea test on 1/29/2024 which revealed a mild degree of obstructive sleep apnea with an overall AHI of 12.1.     He was not convinced that he wanted to try CPAP therapy as I suggested and he is here for a further discussion and review of testing results with his wife and son     [place] Scale is: 7/24 (End copied text).    -A home sleep test was obtained on 1/30/2024 revealing mild obstructive sleep apnea with an initial AHI of 12.1/h.    Interval History:  Agapito Downey is a 89 y.o. male returns for follow up and compliance of PAP therapy. The patient was last seen on 3/25/2024 with Dr. Coombs. Overall the patient feels poor with regard to therapy. He has had difficult with mask fitting. He has difficulty sleeping with something on his face. He also had difficulty with exhalation against the positive pressure. The device appears to be working appropriately. He was hoping pap therapy would work. On average the patient sleeps 4-5 hours then off and on per night. The patient wakes 1-2 times per night.     The patient reports the following changes to their medical and medication history since they were last seen:  None      Further details are as  follows:      [place] Scale is (out of 24): Total score: 7     Weight:  Current Weight: 185 lb    The patient's relevant past medical, surgical, family, and social history reviewed and updated in Epic as appropriate.    PMH:    Past Medical History:   Diagnosis Date    Anxiety Aug 2023    AV bloc first degree     CAD (coronary artery disease)     Cancer     prostate cancer seed implantion; Brachytherapy 2006, PSA has since been undetectable    CHF (congestive heart failure) Dec 2023    Cholelithiasis ?    Diverticulosis     Dyslipidemia     Hyperlipidemia     Hypertension     benign    Myocardial infarction 1994    Nephrolithiasis 09/20/2014    Osteoarthritis      Past Surgical History:   Procedure Laterality Date    CARDIAC ELECTROPHYSIOLOGY PROCEDURE N/A 12/04/2019    Procedure: Device Implant     PPM IMPLANT;  Surgeon: Federico Campbell MD;  Location: [facility] CATH INVASIVE LOCATION;  Service: Cardiology    CHOLECYSTECTOMY  1995    laparoscopic    CORONARY ARTERY BYPASS GRAFT  11/1994    INSERT / REPLACE / REMOVE PACEMAKER      INSERTION PROSTATE RADIATION SEED  2006    Cancer, prostate; PSA has since been undetectable    OTHER SURGICAL HISTORY      Prostate surgery with seed implantation.    TONSILLECTOMY  1943       Allergies   Allergen Reactions    Reserpine Other (See Comments)     depression    Statins Other (See Comments)     Myalgias and mood affect disorder  fatigue         MEDS:  Prior to Admission medications    Medication Sig Start Date End Date Taking? Authorizing Provider   bisoprolol (ZEBeta) 5 MG tablet Take 1.5 tablets by mouth Every Night. 3/14/24   Mario Alberto Gonzalez APRN   cholecalciferol (D3-50) 1.25 MG (40582 UT) capsule Take 1 capsule by mouth 1 (One) Time Per Week. 2/21/24   Kendrick Oconnor MD   empagliflozin (Jardiance) 10 MG tablet tablet Take 1 tablet by mouth Daily. 4/10/24   Shar Jamison III, MD   Evolocumab (REPATHA) solution auto-injector SureClick injection Inject 1 mL  under the skin into the appropriate area as directed Every 14 (Fourteen) Days. 4/24/18   Lan Vanegas MD   furosemide (LASIX) 40 MG tablet Take 1 tablet by mouth 2 (Two) Times a Day. 2/16/24   Shar Jamison III, MD   ketotifen (ZADITOR) 0.025 % ophthalmic solution Apply 1 drop to eye(s) as directed by provider Daily As Needed. 6/16/16   Lan Vanegas MD   levothyroxine (SYNTHROID, LEVOTHROID) 75 MCG tablet TAKE ONE TABLET BY MOUTH ONE TIME DAILY 11/27/23   Kendrick Oconnor MD   losartan (COZAAR) 50 MG tablet Take 1 tablet by mouth Daily.  Patient taking differently: Take 1 tablet by mouth 2 (Two) Times a Day. 5/28/19   Kendrick Oconnor MD   magnesium oxide (MAG-OX) 400 MG tablet Take 1 tablet by mouth Daily.    Lan Vanegas MD   metFORMIN ER (GLUCOPHAGE-XR) 500 MG 24 hr tablet Take 1 tablet by mouth Daily. 3/22/24   Kendrick Oconnor MD   metOLazone (ZAROXOLYN) 2.5 MG tablet Take 1 tablet by mouth As Needed (daily as needed for edema). 1/24/24   Shar Jamison III, MD   Multiple Vitamins-Minerals (CENTRUM SILVER 50+MEN PO) Take 1 tablet by mouth Daily. 2/26/14   Lan Vanegas MD   polyethyl glycol-propyl glycol (SYSTANE) 0.4-0.3 % solution ophthalmic solution Apply 1 drop to eye(s) as directed by provider Daily As Needed. 6/16/16   Lan Vanegas MD   potassium chloride (KLOR-CON M10) 10 MEQ CR tablet Take 1 tablet by mouth 2 (Two) Times a Day. 4/10/24   Mario Alberto Gonzalez APRN   Probiotic Product (ALIGN) 4 MG capsule Take 1 capsule by mouth Daily. 12/17/15   Lan Vanegas MD   psyllium (METAMUCIL) 100 % pack packet Take 1 packet by mouth Daily.    Lan Vanegas MD   spironolactone (ALDACTONE) 25 MG tablet TAKE ONE TABLET BY MOUTH EVERY OTHER DAY  Patient taking differently: Takes on even numbered days 8/21/23   Kendrick Oconnor MD         FH:  Family History   Problem Relation Age of Onset    Heart disease Father     Coronary artery  disease Brother         cause of death    Heart disease Brother     Heart disease Other        Objective   Vital Signs:  /74   Pulse 62   Temp 97.2 °F (36.2 °C) (Infrared)   Wt 83.9 kg (185 lb)   SpO2 95%   BMI 28.13 kg/m²              Physical Exam          Result Review :           PAP Report:  AHI: 0.4/h  Days of Usage: 6/60 (10%)  Number of Days Greater than 4 hours: 0%  Average time (days used): 17 minutes  95th Percentile Pressure: 6.7 cmH2O  95th percentile leaks: 11.6 L/min  Settings: Auto CPAP-8/18 cm H2O, EPR full-time, EPR level 3, response standard.       Assessment and Plan  Agapito Downey is a 89 y.o. male who returns for follow-up and compliance of PAP therapy.  The Pap report has been reviewed.  Overall usage is at 10% with compliance at 0%.  The patient averages 17 minutes of therapy.  With usage, sleep apnea is controlled with an AHI of 0.4/H.  Patient has a history of mild obstructive sleep apnea with an initial AHI of 12.1/h.  The patient wishes to discontinue PAP therapy.  He was hopeful that he would be able to tolerate PAP therapy but has found this to be the case.  He has had difficulty with exhalation pressure, as well as having a mask on his face.  I discussed alternatives to PAP therapy including mandibular advancement device, and surgical consult including the inspire device.  Patient notes not interested at this time and these alternatives.  I will place an order to discontinue PAP therapy per his request and have asked him return his machine.  I have also asked him to return for follow-up as needed.  I discussed the consequences of untreated sleep apnea including detrimental effects to the heart and all body systems up to and including death.  Discussed sleeping in a lateral position as well as sleeping with his head up    Diagnoses and all orders for this visit:    1. KALA (obstructive sleep apnea) (Primary)  -     PAP Therapy    2. Chronic insomnia    3. Overweight  with body mass index (BMI) 25.0-29.9           1. The patient was counseled regarding multimodal approach with healthy nutrition, healthy sleep, regular physical activity, social activities, counseling, and medications. Encouraged to practice lateral sleep position. Avoid alcohol and sedatives close to bedtime.            Follow Up  Return if symptoms worsen or fail to improve.  Patient was given instructions and counseling regarding his condition or for health maintenance advice. Please see specific information pulled into the AVS if appropriate.       KIRAN Boyd, ACNP-BC  Pulmonology, Critical Care, and Sleep Medicine

## 2024-06-12 RX ORDER — BISOPROLOL FUMARATE 5 MG/1
7.5 TABLET, FILM COATED ORAL NIGHTLY
Qty: 135 TABLET | Refills: 0 | Status: SHIPPED | OUTPATIENT
Start: 2024-06-12

## 2024-06-14 ENCOUNTER — OFFICE VISIT (OUTPATIENT)
Dept: SLEEP MEDICINE | Age: 89
End: 2024-06-14
Payer: MEDICARE

## 2024-06-14 VITALS
HEART RATE: 62 BPM | WEIGHT: 185 LBS | SYSTOLIC BLOOD PRESSURE: 126 MMHG | TEMPERATURE: 97.2 F | BODY MASS INDEX: 28.13 KG/M2 | OXYGEN SATURATION: 95 % | DIASTOLIC BLOOD PRESSURE: 74 MMHG

## 2024-06-14 DIAGNOSIS — F51.04 CHRONIC INSOMNIA: ICD-10-CM

## 2024-06-14 DIAGNOSIS — G47.33 OSA (OBSTRUCTIVE SLEEP APNEA): Primary | ICD-10-CM

## 2024-06-14 DIAGNOSIS — E66.3 OVERWEIGHT WITH BODY MASS INDEX (BMI) 25.0-29.9: ICD-10-CM

## 2024-06-17 ENCOUNTER — TELEPHONE (OUTPATIENT)
Dept: INTERNAL MEDICINE | Facility: CLINIC | Age: 89
End: 2024-06-17
Payer: MEDICARE

## 2024-06-17 DIAGNOSIS — E11.42 DIABETIC POLYNEUROPATHY ASSOCIATED WITH TYPE 2 DIABETES MELLITUS: ICD-10-CM

## 2024-06-17 DIAGNOSIS — E03.9 ACQUIRED HYPOTHYROIDISM: ICD-10-CM

## 2024-06-17 DIAGNOSIS — I10 ESSENTIAL HYPERTENSION: Primary | Chronic | ICD-10-CM

## 2024-06-17 DIAGNOSIS — E78.5 DYSLIPIDEMIA: ICD-10-CM

## 2024-06-17 NOTE — TELEPHONE ENCOUNTER
"Caller: Agapito Downey \"Juan\"    Relationship: Self    Best call back number: [phone]     What orders are you requesting (i.e. lab or imaging): LAB WORK    In what timeframe would the patient need to come in: AS SOON AS POSSIBLE    Where will you receive your lab/imaging services: [facility]     Additional notes: PATIENT CALLED TO REQUEST LABS BEFORE HIS MEDICARE WELLNESS VISIT    PLEASE ADVISE ONCE LAB ORDERS ARE PLACED  "

## 2024-06-20 ENCOUNTER — LAB (OUTPATIENT)
Dept: LAB | Facility: HOSPITAL | Age: 89
End: 2024-06-20
Payer: MEDICARE

## 2024-06-20 ENCOUNTER — TELEPHONE (OUTPATIENT)
Dept: CARDIOLOGY | Facility: CLINIC | Age: 89
End: 2024-06-20
Payer: MEDICARE

## 2024-06-20 DIAGNOSIS — E11.42 DIABETIC POLYNEUROPATHY ASSOCIATED WITH TYPE 2 DIABETES MELLITUS: ICD-10-CM

## 2024-06-20 DIAGNOSIS — E03.9 ACQUIRED HYPOTHYROIDISM: ICD-10-CM

## 2024-06-20 DIAGNOSIS — I10 ESSENTIAL HYPERTENSION: ICD-10-CM

## 2024-06-20 DIAGNOSIS — E78.5 DYSLIPIDEMIA: ICD-10-CM

## 2024-06-20 LAB
ALBUMIN SERPL-MCNC: 4.4 G/DL (ref 3.5–5.2)
ALBUMIN/GLOB SERPL: 1.3 G/DL
ALP SERPL-CCNC: 41 U/L (ref 39–117)
ALT SERPL W P-5'-P-CCNC: 22 U/L (ref 1–41)
ANION GAP SERPL CALCULATED.3IONS-SCNC: 16 MMOL/L (ref 5–15)
AST SERPL-CCNC: 20 U/L (ref 1–40)
BASOPHILS # BLD AUTO: 0.07 10*3/MM3 (ref 0–0.2)
BASOPHILS NFR BLD AUTO: 0.9 % (ref 0–1.5)
BILIRUB SERPL-MCNC: 0.6 MG/DL (ref 0–1.2)
BUN SERPL-MCNC: 42 MG/DL (ref 8–23)
BUN/CREAT SERPL: 26.8 (ref 7–25)
CALCIUM SPEC-SCNC: 10.1 MG/DL (ref 8.6–10.5)
CHLORIDE SERPL-SCNC: 96 MMOL/L (ref 98–107)
CHOLEST SERPL-MCNC: 170 MG/DL (ref 0–200)
CO2 SERPL-SCNC: 25 MMOL/L (ref 22–29)
CREAT SERPL-MCNC: 1.57 MG/DL (ref 0.76–1.27)
DEPRECATED RDW RBC AUTO: 42.7 FL (ref 37–54)
EGFRCR SERPLBLD CKD-EPI 2021: 42.4 ML/MIN/1.73
EOSINOPHIL # BLD AUTO: 0.35 10*3/MM3 (ref 0–0.4)
EOSINOPHIL NFR BLD AUTO: 4.3 % (ref 0.3–6.2)
ERYTHROCYTE [DISTWIDTH] IN BLOOD BY AUTOMATED COUNT: 12.5 % (ref 12.3–15.4)
GLOBULIN UR ELPH-MCNC: 3.5 GM/DL
GLUCOSE SERPL-MCNC: 144 MG/DL (ref 65–99)
HBA1C MFR BLD: 6.6 % (ref 4.8–5.6)
HCT VFR BLD AUTO: 43.4 % (ref 37.5–51)
HDLC SERPL-MCNC: 44 MG/DL (ref 40–60)
HGB BLD-MCNC: 15.1 G/DL (ref 13–17.7)
IMM GRANULOCYTES # BLD AUTO: 0.03 10*3/MM3 (ref 0–0.05)
IMM GRANULOCYTES NFR BLD AUTO: 0.4 % (ref 0–0.5)
LDLC SERPL CALC-MCNC: 76 MG/DL (ref 0–100)
LDLC/HDLC SERPL: 1.46 {RATIO}
LYMPHOCYTES # BLD AUTO: 2.08 10*3/MM3 (ref 0.7–3.1)
LYMPHOCYTES NFR BLD AUTO: 25.6 % (ref 19.6–45.3)
MCH RBC QN AUTO: 32.9 PG (ref 26.6–33)
MCHC RBC AUTO-ENTMCNC: 34.8 G/DL (ref 31.5–35.7)
MCV RBC AUTO: 94.6 FL (ref 79–97)
MONOCYTES # BLD AUTO: 0.62 10*3/MM3 (ref 0.1–0.9)
MONOCYTES NFR BLD AUTO: 7.6 % (ref 5–12)
NEUTROPHILS NFR BLD AUTO: 4.99 10*3/MM3 (ref 1.7–7)
NEUTROPHILS NFR BLD AUTO: 61.2 % (ref 42.7–76)
NRBC BLD AUTO-RTO: 0 /100 WBC (ref 0–0.2)
PLATELET # BLD AUTO: 290 10*3/MM3 (ref 140–450)
PMV BLD AUTO: 10 FL (ref 6–12)
POTASSIUM SERPL-SCNC: 4.1 MMOL/L (ref 3.5–5.2)
PROT SERPL-MCNC: 7.9 G/DL (ref 6–8.5)
RBC # BLD AUTO: 4.59 10*6/MM3 (ref 4.14–5.8)
SODIUM SERPL-SCNC: 137 MMOL/L (ref 136–145)
TRIGL SERPL-MCNC: 309 MG/DL (ref 0–150)
TSH SERPL DL<=0.05 MIU/L-ACNC: 3.73 UIU/ML (ref 0.27–4.2)
VLDLC SERPL-MCNC: 50 MG/DL (ref 5–40)
WBC NRBC COR # BLD AUTO: 8.14 10*3/MM3 (ref 3.4–10.8)

## 2024-06-20 PROCEDURE — 83036 HEMOGLOBIN GLYCOSYLATED A1C: CPT

## 2024-06-20 PROCEDURE — 85025 COMPLETE CBC W/AUTO DIFF WBC: CPT

## 2024-06-20 PROCEDURE — 82570 ASSAY OF URINE CREATININE: CPT

## 2024-06-20 PROCEDURE — 84443 ASSAY THYROID STIM HORMONE: CPT

## 2024-06-20 PROCEDURE — 80053 COMPREHEN METABOLIC PANEL: CPT

## 2024-06-20 PROCEDURE — 80061 LIPID PANEL: CPT

## 2024-06-20 PROCEDURE — 82043 UR ALBUMIN QUANTITATIVE: CPT

## 2024-06-20 NOTE — TELEPHONE ENCOUNTER
Caller name: Agapito Downey     Phone Number: [phone]    Surgeon's name: DR. MCCARTHY - [facility]    Type of planned surgery: CAVITY FILLINGS. AND A CROWN    Date of planned surgery: NOT SCHEDULED UNTIL HE HEARS FROM     Type of anesthesia: NOT SURE    Have you been experiencing chest pain or shortness of breath? NO    Is your doctor requesting for you to stop any of your medications prior to your surgery? NO    Where should we fax the clearance to? PHONE - [phone]

## 2024-06-21 LAB
ALBUMIN UR-MCNC: <1.2 MG/DL
CREAT UR-MCNC: 75.9 MG/DL
MICROALBUMIN/CREAT UR: NORMAL MG/G{CREAT}

## 2024-06-21 NOTE — TELEPHONE ENCOUNTER
Notified of message above from . Verbalized understanding.Patient will call back with fax # to send letter.

## 2024-06-21 NOTE — TELEPHONE ENCOUNTER
Called patient to report message above from . No answer. Left voice message to call our office.Also phone number listed for the Dentist's office. No answer.

## 2024-06-25 ENCOUNTER — OFFICE VISIT (OUTPATIENT)
Dept: INTERNAL MEDICINE | Facility: CLINIC | Age: 89
End: 2024-06-25
Payer: MEDICARE

## 2024-06-25 VITALS
WEIGHT: 184 LBS | BODY MASS INDEX: 27.89 KG/M2 | TEMPERATURE: 98.4 F | OXYGEN SATURATION: 92 % | HEART RATE: 72 BPM | HEIGHT: 68 IN | DIASTOLIC BLOOD PRESSURE: 60 MMHG | SYSTOLIC BLOOD PRESSURE: 100 MMHG

## 2024-06-25 DIAGNOSIS — I10 ESSENTIAL HYPERTENSION: Chronic | ICD-10-CM

## 2024-06-25 DIAGNOSIS — E03.9 ACQUIRED HYPOTHYROIDISM: ICD-10-CM

## 2024-06-25 DIAGNOSIS — R79.89 ABNORMAL BLOOD CREATININE LEVEL: ICD-10-CM

## 2024-06-25 DIAGNOSIS — E78.5 DYSLIPIDEMIA: ICD-10-CM

## 2024-06-25 DIAGNOSIS — E11.42 DIABETIC POLYNEUROPATHY ASSOCIATED WITH TYPE 2 DIABETES MELLITUS: ICD-10-CM

## 2024-06-25 DIAGNOSIS — Z00.00 PREVENTATIVE HEALTH CARE: Primary | ICD-10-CM

## 2024-06-25 PROCEDURE — 1160F RVW MEDS BY RX/DR IN RCRD: CPT | Performed by: INTERNAL MEDICINE

## 2024-06-25 PROCEDURE — 1170F FXNL STATUS ASSESSED: CPT | Performed by: INTERNAL MEDICINE

## 2024-06-25 PROCEDURE — 1159F MED LIST DOCD IN RCRD: CPT | Performed by: INTERNAL MEDICINE

## 2024-06-25 PROCEDURE — 1126F AMNT PAIN NOTED NONE PRSNT: CPT | Performed by: INTERNAL MEDICINE

## 2024-06-25 PROCEDURE — G0439 PPPS, SUBSEQ VISIT: HCPCS | Performed by: INTERNAL MEDICINE

## 2024-06-26 ENCOUNTER — TELEPHONE (OUTPATIENT)
Dept: CARDIOLOGY | Facility: CLINIC | Age: 89
End: 2024-06-26

## 2024-06-26 NOTE — TELEPHONE ENCOUNTER
"  Caller: Agapito Downey \"Juan\"    Relationship: Self    Best call back number: [phone]    INCOMING CALL TO THE HUB. PATIENT STATED THAT THE FAX MACHINE HAS CALLED HIM SEVERAL TIMES AND HE WOULD APPRECIATE IT IF YOU TURNED IT OFF.       "

## 2024-07-02 DIAGNOSIS — I50.32 CHRONIC HEART FAILURE WITH PRESERVED EJECTION FRACTION (HFPEF): ICD-10-CM

## 2024-07-02 RX ORDER — EMPAGLIFLOZIN 10 MG/1
10 TABLET, FILM COATED ORAL DAILY
Qty: 90 TABLET | Refills: 0 | Status: SHIPPED | OUTPATIENT
Start: 2024-07-02

## 2024-07-11 DIAGNOSIS — I50.31 ACUTE DIASTOLIC CHF (CONGESTIVE HEART FAILURE): ICD-10-CM

## 2024-07-11 RX ORDER — POTASSIUM CHLORIDE 750 MG/1
10 TABLET, EXTENDED RELEASE ORAL 2 TIMES DAILY
Qty: 60 TABLET | Refills: 1 | Status: SHIPPED | OUTPATIENT
Start: 2024-07-11

## 2024-07-12 DIAGNOSIS — I50.32 CHRONIC HEART FAILURE WITH PRESERVED EJECTION FRACTION (HFPEF): ICD-10-CM

## 2024-07-12 RX ORDER — EMPAGLIFLOZIN 10 MG/1
10 TABLET, FILM COATED ORAL DAILY
Qty: 90 TABLET | Refills: 0 | OUTPATIENT
Start: 2024-07-12

## 2024-07-15 ENCOUNTER — SPECIALTY PHARMACY (OUTPATIENT)
Dept: CARDIOLOGY | Facility: CLINIC | Age: 89
End: 2024-07-15
Payer: MEDICARE

## 2024-07-15 NOTE — PROGRESS NOTES
Specialty Pharmacy Patient Management Program  Cardiology Initial Assessment     Agapito Downey was referred by a Cardiology provider to the Cardiology Patient Management program offered by [facility] for Hyperlipidemia on 07/15/24.  An initial outreach was conducted, including assessment of therapy appropriateness and specialty medication education for Repatha. The patient was introduced to services offered by [facility], including: regular assessments, refill coordination, mail order delivery options, prior authorization maintenance, and financial assistance programs as applicable. The patient was also provided with contact information for the pharmacy team.     Insurance Coverage & Financial Support  [employer]     Relevant Past Medical History and Comorbidities  Relevant medical history and concomitant health conditions were discussed with the patient. The patient's chart has been reviewed for relevant past medical history and comorbid conditions and updated as necessary.  Past Medical History:   Diagnosis Date    Anxiety Aug 2023    AV bloc first degree     CAD (coronary artery disease)     Cancer     prostate cancer seed implantion; Brachytherapy 2006, PSA has since been undetectable    CHF (congestive heart failure) Dec 2023    Cholelithiasis ?    Diverticulosis     Dyslipidemia     Hyperlipidemia     Hypertension     benign    Myocardial infarction 1994    Nephrolithiasis 09/20/2014    Osteoarthritis      Social History     Socioeconomic History    Marital status:    Tobacco Use    Smoking status: Never     Passive exposure: Never    Smokeless tobacco: Never   Vaping Use    Vaping status: Never Used   Substance and Sexual Activity    Alcohol use: No    Drug use: No    Sexual activity: Not Currently     Partners: Female       Problem list reviewed by Emma Rangel, PharmD on 7/15/2024 at 10:33 AM    Allergies  Known allergies and  reactions were discussed with the patient. The patient's chart has been reviewed for  allergy information and updated as necessary.   Allergies   Allergen Reactions    Reserpine Other (See Comments)     depression    Statins Other (See Comments)     Myalgias and mood affect disorder  fatigue         Allergies reviewed by Emma Rangel, PharmD on 7/15/2024 at 10:33 AM    Relevant Laboratory Values  Relevant laboratory values were discussed with the patient. The following specialty medication dose adjustment(s) are recommended: None    Lab Results   Component Value Date    GLUCOSE 144 (H) 06/20/2024    CALCIUM 10.1 06/20/2024     06/20/2024    K 4.1 06/20/2024    CO2 25.0 06/20/2024    CL 96 (L) 06/20/2024    BUN 42 (H) 06/20/2024    CREATININE 1.57 (H) 06/20/2024    EGFRIFAFRI 89 12/07/2021    EGFRIFNONA 74 12/07/2021    BCR 26.8 (H) 06/20/2024    ANIONGAP 16.0 (H) 06/20/2024     Lab Results   Component Value Date    CHOL 170 06/20/2024    CHLPL 79 12/18/2023    TRIG 309 (H) 06/20/2024    HDL 44 06/20/2024    LDL 76 06/20/2024     Microalbumin          6/20/2024    14:28   Microalbumin   Microalbumin, Urine <1.2        Current Medication List  This medication list has been reviewed with the patient and evaluated for any interactions or necessary modifications/recommendations, and updated to include all prescription medications, OTC medications, and supplements the patient is currently taking.  This list reflects what is contained in the patient's profile, which has also been marked as reviewed to communicate to other providers it is the most up to date version of the patient's current medication therapy.     Current Outpatient Medications:     Evolocumab (REPATHA) solution auto-injector SureClick injection, Inject 1 mL under the skin into the appropriate area as directed Every 14 (Fourteen) Days., Disp: 6 mL, Rfl: 3    bisoprolol (ZEBeta) 5 MG tablet, Take 1.5 tablets by mouth Every Night., Disp: 135 tablet,  Rfl: 0    cholecalciferol (D3-50) 1.25 MG (34370 UT) capsule, Take 1 capsule by mouth 1 (One) Time Per Week., Disp: 12 capsule, Rfl: 1    furosemide (LASIX) 40 MG tablet, Take 1 tablet by mouth 2 (Two) Times a Day., Disp: 60 tablet, Rfl: 5    Jardiance 10 MG tablet tablet, TAKE 1 TABLET BY MOUTH DAILY, Disp: 90 tablet, Rfl: 0    ketotifen (ZADITOR) 0.025 % ophthalmic solution, Apply 1 drop to eye(s) as directed by provider Daily As Needed., Disp: , Rfl:     levothyroxine (SYNTHROID, LEVOTHROID) 75 MCG tablet, TAKE ONE TABLET BY MOUTH ONE TIME DAILY, Disp: 90 tablet, Rfl: 3    losartan (COZAAR) 50 MG tablet, Take 1 tablet by mouth Daily. (Patient taking differently: Take 1 tablet by mouth 2 (Two) Times a Day.), Disp: , Rfl:     magnesium oxide (MAG-OX) 400 MG tablet, Take 1 tablet by mouth Daily., Disp: , Rfl:     metFORMIN ER (GLUCOPHAGE-XR) 500 MG 24 hr tablet, Take 1 tablet by mouth Daily., Disp: 90 tablet, Rfl: 1    metOLazone (ZAROXOLYN) 2.5 MG tablet, Take 1 tablet by mouth As Needed (daily as needed for edema)., Disp: 30 tablet, Rfl: 1    Multiple Vitamins-Minerals (CENTRUM SILVER 50+MEN PO), Take 1 tablet by mouth Daily., Disp: , Rfl:     polyethyl glycol-propyl glycol (SYSTANE) 0.4-0.3 % solution ophthalmic solution, Apply 1 drop to eye(s) as directed by provider Daily As Needed., Disp: , Rfl:     potassium chloride (KLOR-CON M10) 10 MEQ CR tablet, Take 1 tablet by mouth 2 (Two) Times a Day., Disp: 60 tablet, Rfl: 1    Probiotic Product (ALIGN) 4 MG capsule, Take 1 capsule by mouth Daily., Disp: , Rfl:     psyllium (METAMUCIL) 100 % pack packet, Take 1 packet by mouth Daily., Disp: , Rfl:     spironolactone (ALDACTONE) 25 MG tablet, TAKE ONE TABLET BY MOUTH EVERY OTHER DAY (Patient taking differently: Takes on even numbered days), Disp: 45 tablet, Rfl: 5    Medicines reviewed by Emma Rangel, PharmD on 7/15/2024 at 10:33 AM    Drug Interactions  None  Recommended Medications Assessment  Aspirin: Not  Taking Currently  Statin: Contraindicated  ACEi/ARB: Currently Taking     Initial Education Provided for Specialty Medication  The patient has been provided with the following education and any applicable administration techniques (i.e. self-injection) have been demonstrated for the therapies indicated. All questions and concerns have been addressed prior to the patient receiving the medication, and the patient has verbalized comprehension of the education and any materials provided. Additional patient education shall be provided and documented upon request by the patient, provider, or payer.    REPATHA® (evolocumab)  Medication Expectations   Why am I taking this medication? You are taking Repatha to lower your “bad” cholesterol (LDL-C). This medication can be used in adults with high blood cholesterol including primary hyperlipidemia and familial hypercholesterolemia.    What should I expect while on this medication? You should expect to see your cholesterol improve over time. Specifically, you should see your LDL-C decrease.    How does the medication work? Repatha works by blocking a protein called PCSK9 that contributes to high levels of bad cholesterol. It helps increase your liver's ability to remove bad cholesterol from your blood.     How long will I be on this medication for? The amount of time you will be on this medication will be determined by your doctor based on your cholesterol and/or your risk of having a cardiac event. You will most likely be on this medication or another cholesterol medication throughout your lifetime. Do not abruptly stop this medication without talking to your doctor first.    How do I take this medication? Take as directed on your prescription label. Repatha is injected under the skin (subcutaneously) of your stomach, thigh, or upper arm. This medication is usually given one or twice a month.   What are some possible side effects? The most common side effects of Repatha include  redness, itching, swelling, or pain/tenderness at the injection site, symptoms of the common cold, flu or flu-like symptoms or back pain.    What happens if I miss a dose? If you miss a dose, take it as soon as you remember if it is within 7 days from the usual day of administration then resume your original schedule. If it is beyond 7 days and you use the walt-2-week dose, skip the missed dose and resume your normal dosing schedule.If it is beyond 7 days and you use the once-monthly dose, inject the dose and start a new schedule based on that date.      Medication Safety   What are things I should warn my doctor immediately about? Talk to your doctor if you are pregnant, planning to become pregnant, or breastfeeding. Stop the medication and tell your doctor or seek emergency medical help if you notice any signs/symptoms of an allergic reaction (severe rash, redness, hives, severe itching, trouble breathing, or swelling of the face, lips, or tongue). If you have a rubber or latex allergy, you should not use the Repatha SureClick® Autoinjector pen or the prefilled syringe, please notify your doctor or pharmacist.   What are things that I should be cautious of? Be cautious of any side effects from this medication. Talk to your doctor if any new ones develop or aren't getting better.   What are some medications that can interact with this one? There are no known significant drug interactions with Repatha. Always tell your doctor or pharmacist immediately if you start taking any new medications, including over-the-counter medications, vitamins, and herbal supplements.      Medication Storage/Handling   How should I handle this medication? Do not shake or expose the pens, cartridges, or syringes to extreme heat or direct sunlight. Keep this medication out of reach of pets/children. Allow medication to warm at room temperature prior to administration.   How does this medication need to be stored? Store unused pens,  cartridges, or syringes in the refrigerator in the original cartons to protect from light. If needed, Repatha may be kept at room temperature in the original carton for up to 30 days. Do not freeze.    How should I dispose of this medication? All the Repatha devices are single-dose and should be discarded in a sharps container after use. If your doctor decides to stop this medication, take to your local police station for proper disposal. Some pharmacies also have take-back bins for medication drop-off.      Resources/Support   How can I remind myself to take this medication? You can download reminder apps to help you manage your refills. You may also set an alarm on your phone to remind you to take your dose.    Is financial support available?  [employer] can provide co-pay cards if you have commercial insurance or patient assistance if you have Medicare or no insurance.    Which vaccines are recommended for me? Talk to your doctor about these vaccines: Flu, Coronavirus (COVID-19), Pneumococcal (pneumonia), Tdap, Hepatitis B, Zoster (shingles)          Adherence and Self-Administration  Adherence related to the patient's specialty therapy was discussed with the patient. The Adherence segment of this outreach has been reviewed and updated.     Is there a concern with patient's ability to self administer the medication correctly and without issue?: No  Were any potential barriers to adherence identified during the initial assessment or patient education?: No  Are there any concerns regarding the patient's understanding of the importance of medication adherence?: No  Methods for Supporting Patient Adherence and/or Self-Administration: None; already using Repatha    Open Medication Therapy Problems  No medication therapy recommendations to display    Goals of Therapy  Goals related to the patient's specialty therapy were discussed with the patient. The Patient Goals segment of this outreach has been reviewed and updated.    Goals Addressed Today        Specialty Pharmacy General Goal      LDL Goal < 100 mg/dL    Lab Results   Component Value Date    LDL 76 06/20/2024    LDL 17 12/18/2023    LDL 55 06/15/2023    LDL 63 12/15/2022    LDL 69 06/10/2022                  Reassessment Plan & Follow-Up  1. Medication Therapy Changes: Continue Repatha 140mg subcutaneously every 14 days  2. Related Plans, Therapy Recommendations, or Therapy Problems to Be Addressed: None  3. Pharmacist to perform regular assessments no more than (6) months from the previous assessment.  4. Care Coordinator to set up future refill outreaches, coordinate prescription delivery, and escalate clinical questions to pharmacist.  5. Welcome information and patient satisfaction survey to be sent by specialty pharmacy team with patient's initial fill.    Attestation  Therapeutic appropriateness: Appropriate   I attest the patient was actively involved in and has agreed to the above plan of care. If the prescribed therapy is at any point deemed not appropriate based on the current or future assessments, a consultation will be initiated with the patient's specialty care provider to determine the best course of action. The revised plan of therapy will be documented along with any required assessments and/or additional patient education provided.     Emma Rangel, PharmD, BCPS  Clinical Specialty Pharmacist, Cardiology  7/15/2024  10:35 EDT

## 2024-07-22 ENCOUNTER — OFFICE VISIT (OUTPATIENT)
Dept: CARDIOLOGY | Facility: CLINIC | Age: 89
End: 2024-07-22
Payer: MEDICARE

## 2024-07-22 VITALS
SYSTOLIC BLOOD PRESSURE: 126 MMHG | OXYGEN SATURATION: 97 % | HEIGHT: 68 IN | HEART RATE: 60 BPM | DIASTOLIC BLOOD PRESSURE: 76 MMHG | BODY MASS INDEX: 27.72 KG/M2 | WEIGHT: 182.9 LBS

## 2024-07-22 DIAGNOSIS — I25.10 ASHD (ARTERIOSCLEROTIC HEART DISEASE): Primary | ICD-10-CM

## 2024-07-22 DIAGNOSIS — E78.5 DYSLIPIDEMIA: ICD-10-CM

## 2024-07-22 DIAGNOSIS — I25.810 CORONARY ARTERY DISEASE INVOLVING CORONARY BYPASS GRAFT OF NATIVE HEART WITHOUT ANGINA PECTORIS: Chronic | ICD-10-CM

## 2024-07-22 DIAGNOSIS — I10 ESSENTIAL HYPERTENSION: Chronic | ICD-10-CM

## 2024-07-22 DIAGNOSIS — I49.5 SSS (SICK SINUS SYNDROME): ICD-10-CM

## 2024-07-22 PROCEDURE — 93280 PM DEVICE PROGR EVAL DUAL: CPT | Performed by: INTERNAL MEDICINE

## 2024-07-22 PROCEDURE — 99214 OFFICE O/P EST MOD 30 MIN: CPT | Performed by: INTERNAL MEDICINE

## 2024-07-22 NOTE — PROGRESS NOTES
[facility] Cardiology  Office visit  Agapito Downey  1935  [phone]  There is no work phone number on file.    VISIT DATE:  7/22/2024    PCP: Kendrick Oconnor MD  [address]  [facility] [address]    CC:  Chief Complaint   Patient presents with    SSS (sick sinus syndrome)     PROBLEM LIST:  Coronary artery disease:  History of myocardial infarction in 1994 with TPA and associated NSVT.  Angioplasty at that time.  Recurrent chest pain approximately 6 months later with catheterization in November 1994.  Subsequent coronary artery bypass grafting, November 1994.  Multiple stress tests and echocardiograms post procedure with most recent echocardiogram, 01/19/2011:  Ejection fraction 60% to 65% with mild diastolic dysfunction.  No evidence of ischemia on stress test.  First-degree AV block.  Benign hypertension.  Dyslipidemia.  History of prostate cancer with seed implantation.  Osteoarthritis.  Family history of heart disease.  Surgical history:  Laparoscopic cholecystectomy.  Coronary artery bypass grafting.  Prostate surgery with seed implantation.  Previous cardiac studies and procedures:  2017  Dobutamine myocardial perfusion imaging  The patient's underlying heart rhythm is second-degree AV block Mobitz type I. With dobutamine infusion, the rhythm converted to sinus tachycardia  The ECG portion of the stress test was negative for ECG and clinical evidence for ischemia.  The myocardial perfusion imaging portion of the stress test indicates a small-sized infarct located in the basal inferior lateral wall with mild tri-infarct ischemia.  Overall, impressions are consistent with a low risk study.  TTE  Left ventricular systolic function is normal. Estimated EF = 54%.  Left ventricular wall thickness is consistent with mild concentric hypertrophy.  Left ventricular diastolic dysfunction (grade I) consistent with impaired relaxation.    January 2024  TTE    Left ventricular systolic function is normal. Calculated left ventricular EF = 52% Left ventricular ejection fraction appears to be 46 - 50%.    Left ventricular wall thickness is consistent with mild concentric hypertrophy.    Left ventricular diastolic function was indeterminate.    The right ventricular cavity is mildly dilated.    The right atrial cavity is dilated.    Moderate tricuspid valve regurgitation is present.    Estimated right ventricular systolic pressure from tricuspid regurgitation is mildly elevated (35-45 mmHg).    ASSESSMENT:   Diagnosis Plan   1. ASHD (arteriosclerotic heart disease)        2. Coronary artery disease involving coronary bypass graft of native heart without angina pectoris        3. Dyslipidemia        4. Essential hypertension        5. SSS (sick sinus syndrome)              Device interrogation:  [place] dual-chamber  Normal atrial and ventricular pacing and sensing thresholds.  Estimated battery life 6 years  Less than 1% mode switching secondary to atrial flutter, longest episode lasting 3.5 hours    PLAN:  Coronary disease: Currently stable asymptomatic.  Continue current medical therapy.  Developed epistaxis with antiplatelet agents.    Sick sinus syndrome: Continue routine follow-up in device clinic.    Paroxysmal A-fib/flutter: Limited burden of arrhythmia.  Has developed significant epistaxis with both Eliquis and low-dose aspirin.  Continue to trend burden of arrhythmia through device interrogation, will consider left atrial appendage closure if arrhythmia increases in frequency or duration.    Heart failure with reduced ejection fraction, chronic: Currently euvolemic and compensated.  Continue current medical therapy.  Continue metolazone 2.5 mg p.o. as needed for weight gain greater than 3 to 5 pounds above baseline.    Subjective  Currently denies palpitations or dyspnea on exertion.  Weights ranging from 175 to 180 pounds, currently taking metolazone when  "he hits 180.  Currently needing to take it about every 3 to 4 days.  Compliant with medical therapy.  Blood pressures running less than 130/80 mmHg.  Intermittent nocturnal epigastric discomfort which is relieved with antacids.    PHYSICAL EXAMINATION:  Vitals:    07/22/24 1332   BP: 126/76   BP Location: Right arm   Patient Position: Sitting   Pulse: 60   SpO2: 97%   Weight: 83 kg (182 lb 14.4 oz)   Height: 171.5 cm (67.5\")       General Appearance:    Alert, cooperative, no distress, appears stated age   Head:    Normocephalic, without obvious abnormality, atraumatic   Eyes:    conjunctiva/corneas clear   Nose:   Nares normal, septum midline, mucosa normal, no drainage   Throat:   Lips, teeth and gums normal   Neck:   Supple, symmetrical, trachea midline, no carotid    bruit or JVD   Lungs:     Clear to auscultation bilaterally, respirations unlabored   Chest Wall:    No tenderness or deformity    Heart:    Regular rate and rhythm, S1 and S2 normal, no murmur, rub   or gallop, normal carotid impulse bilaterally without bruit.   Abdomen:     Soft, non-tender   Extremities:   Extremities normal, atraumatic, no cyanosis or edema   Pulses:   2+ and symmetric all extremities   Skin:   Skin color, texture, turgor normal, no rashes or lesions       Diagnostic Data:  Procedures  Lab Results   Component Value Date    CHLPL 79 12/18/2023    TRIG 309 (H) 06/20/2024    HDL 44 06/20/2024     Lab Results   Component Value Date    GLUCOSE 144 (H) 06/20/2024    BUN 42 (H) 06/20/2024    CREATININE 1.57 (H) 06/20/2024     06/20/2024    K 4.1 06/20/2024    CL 96 (L) 06/20/2024    CO2 25.0 06/20/2024     Lab Results   Component Value Date    HGBA1C 6.60 (H) 06/20/2024     Lab Results   Component Value Date    WBC 8.14 06/20/2024    HGB 15.1 06/20/2024    HCT 43.4 06/20/2024     06/20/2024       Allergies  Allergies   Allergen Reactions    Reserpine Other (See Comments)     depression    Statins Other (See Comments)     " Myalgias and mood affect disorder  fatigue         Current Medications    Current Outpatient Medications:     bisoprolol (ZEBeta) 5 MG tablet, Take 1.5 tablets by mouth Every Night., Disp: 135 tablet, Rfl: 0    cholecalciferol (D3-50) 1.25 MG (94416 UT) capsule, Take 1 capsule by mouth 1 (One) Time Per Week., Disp: 12 capsule, Rfl: 1    Evolocumab (REPATHA) solution auto-injector SureClick injection, Inject 1 mL under the skin into the appropriate area as directed Every 14 (Fourteen) Days., Disp: 6 mL, Rfl: 3    furosemide (LASIX) 40 MG tablet, Take 1 tablet by mouth 2 (Two) Times a Day., Disp: 60 tablet, Rfl: 5    Jardiance 10 MG tablet tablet, TAKE 1 TABLET BY MOUTH DAILY, Disp: 90 tablet, Rfl: 0    ketotifen (ZADITOR) 0.025 % ophthalmic solution, Apply 1 drop to eye(s) as directed by provider Daily As Needed., Disp: , Rfl:     levothyroxine (SYNTHROID, LEVOTHROID) 75 MCG tablet, TAKE ONE TABLET BY MOUTH ONE TIME DAILY, Disp: 90 tablet, Rfl: 3    losartan (COZAAR) 50 MG tablet, Take 1 tablet by mouth Daily. (Patient taking differently: Take 1 tablet by mouth 2 (Two) Times a Day.), Disp: , Rfl:     magnesium oxide (MAG-OX) 400 MG tablet, Take 1 tablet by mouth Daily., Disp: , Rfl:     metFORMIN ER (GLUCOPHAGE-XR) 500 MG 24 hr tablet, Take 1 tablet by mouth Daily., Disp: 90 tablet, Rfl: 1    metOLazone (ZAROXOLYN) 2.5 MG tablet, Take 1 tablet by mouth As Needed (daily as needed for edema)., Disp: 30 tablet, Rfl: 1    Multiple Vitamins-Minerals (CENTRUM SILVER 50+MEN PO), Take 1 tablet by mouth Daily., Disp: , Rfl:     polyethyl glycol-propyl glycol (SYSTANE) 0.4-0.3 % solution ophthalmic solution, Apply 1 drop to eye(s) as directed by provider Daily As Needed., Disp: , Rfl:     potassium chloride (KLOR-CON M10) 10 MEQ CR tablet, Take 1 tablet by mouth 2 (Two) Times a Day., Disp: 60 tablet, Rfl: 1    Probiotic Product (ALIGN) 4 MG capsule, Take 1 capsule by mouth Daily., Disp: , Rfl:     psyllium (METAMUCIL) 100 %  pack packet, Take 1 packet by mouth Daily., Disp: , Rfl:     spironolactone (ALDACTONE) 25 MG tablet, TAKE ONE TABLET BY MOUTH EVERY OTHER DAY (Patient taking differently: Takes on even numbered days), Disp: 45 tablet, Rfl: 5          ROS  ROS      SOCIAL HX  Social History     Socioeconomic History    Marital status:    Tobacco Use    Smoking status: Never     Passive exposure: Never    Smokeless tobacco: Never   Vaping Use    Vaping status: Never Used   Substance and Sexual Activity    Alcohol use: No    Drug use: No    Sexual activity: Not Currently     Partners: Female       FAMILY HX  Family History   Problem Relation Age of Onset    Heart disease Father     Coronary artery disease Brother         cause of death    Heart disease Brother     Heart disease Other              Shar Jamison III, MD, FACC

## 2024-07-23 ENCOUNTER — LAB (OUTPATIENT)
Dept: LAB | Facility: HOSPITAL | Age: 89
End: 2024-07-23
Payer: MEDICARE

## 2024-07-23 DIAGNOSIS — R79.89 ABNORMAL BLOOD CREATININE LEVEL: ICD-10-CM

## 2024-07-23 DIAGNOSIS — I50.32 CHRONIC HEART FAILURE WITH PRESERVED EJECTION FRACTION (HFPEF): ICD-10-CM

## 2024-07-23 LAB
ANION GAP SERPL CALCULATED.3IONS-SCNC: 14.1 MMOL/L (ref 5–15)
BUN SERPL-MCNC: 47 MG/DL (ref 8–23)
BUN/CREAT SERPL: 24.6 (ref 7–25)
CALCIUM SPEC-SCNC: 10.3 MG/DL (ref 8.6–10.5)
CHLORIDE SERPL-SCNC: 98 MMOL/L (ref 98–107)
CO2 SERPL-SCNC: 24.9 MMOL/L (ref 22–29)
CREAT SERPL-MCNC: 1.91 MG/DL (ref 0.76–1.27)
EGFRCR SERPLBLD CKD-EPI 2021: 33.5 ML/MIN/1.73
GLUCOSE SERPL-MCNC: 122 MG/DL (ref 65–99)
NT-PROBNP SERPL-MCNC: 497 PG/ML (ref 0–1800)
POTASSIUM SERPL-SCNC: 3.8 MMOL/L (ref 3.5–5.2)
SODIUM SERPL-SCNC: 137 MMOL/L (ref 136–145)

## 2024-07-23 PROCEDURE — 83880 ASSAY OF NATRIURETIC PEPTIDE: CPT

## 2024-07-23 PROCEDURE — 80048 BASIC METABOLIC PNL TOTAL CA: CPT

## 2024-07-23 PROCEDURE — 36415 COLL VENOUS BLD VENIPUNCTURE: CPT

## 2024-07-24 ENCOUNTER — TELEPHONE (OUTPATIENT)
Dept: CARDIOLOGY | Facility: CLINIC | Age: 89
End: 2024-07-24
Payer: MEDICARE

## 2024-07-24 DIAGNOSIS — I10 ESSENTIAL HYPERTENSION: Primary | ICD-10-CM

## 2024-07-24 RX ORDER — LOSARTAN POTASSIUM 50 MG/1
50 TABLET ORAL DAILY
Qty: 30 TABLET | Refills: 3 | Status: SHIPPED | OUTPATIENT
Start: 2024-07-24

## 2024-07-24 NOTE — TELEPHONE ENCOUNTER
----- Message from Shar Jamison sent at 7/24/2024  2:00 PM EDT -----  Decrease losartan to 50 mg p.o. daily.  Repeat BMP in 1 month.

## 2024-08-07 RX ORDER — CHOLECALCIFEROL (VITAMIN D3) 1250 MCG
50000 CAPSULE ORAL WEEKLY
Qty: 12 CAPSULE | Refills: 1 | Status: SHIPPED | OUTPATIENT
Start: 2024-08-07

## 2024-08-09 NOTE — TELEPHONE ENCOUNTER
Caller: [facility] ([facility]) #[id number] - [place], [place] - [address] AT [facility] - [phone] [facility] [phone] FX    Relationship: Pharmacy    Best call back number: [phone]    Requested Prescriptions:   Requested Prescriptions     Pending Prescriptions Disp Refills    Evolocumab (REPATHA) solution auto-injector SureClick injection 6 mL 3     Sig: Inject 1 mL under the skin into the appropriate area as directed Every 14 (Fourteen) Days.        Pharmacy where request should be sent: [facility] ([facility]) #[id number] - [place], [place] - [address] AT [facility] - [phone] [facility] [phone] FX     Last office visit with prescribing clinician: 7/22/2024   Last telemedicine visit with prescribing clinician: Visit date not found   Next office visit with prescribing clinician: 2/4/2025     Additional details provided by patient:       Donta Wilder   08/09/24 10:40 EDT

## 2024-08-11 DIAGNOSIS — I50.31 ACUTE DIASTOLIC CHF (CONGESTIVE HEART FAILURE): ICD-10-CM

## 2024-08-12 RX ORDER — FUROSEMIDE 40 MG/1
40 TABLET ORAL 2 TIMES DAILY
Qty: 60 TABLET | Refills: 5 | Status: SHIPPED | OUTPATIENT
Start: 2024-08-12

## 2024-08-26 ENCOUNTER — LAB (OUTPATIENT)
Dept: LAB | Facility: HOSPITAL | Age: 89
End: 2024-08-26
Payer: MEDICARE

## 2024-08-26 DIAGNOSIS — I10 ESSENTIAL HYPERTENSION: ICD-10-CM

## 2024-08-26 LAB
ANION GAP SERPL CALCULATED.3IONS-SCNC: 13 MMOL/L (ref 5–15)
BUN SERPL-MCNC: 32 MG/DL (ref 8–23)
BUN/CREAT SERPL: 18.9 (ref 7–25)
CALCIUM SPEC-SCNC: 10.5 MG/DL (ref 8.6–10.5)
CHLORIDE SERPL-SCNC: 96 MMOL/L (ref 98–107)
CO2 SERPL-SCNC: 26 MMOL/L (ref 22–29)
CREAT SERPL-MCNC: 1.69 MG/DL (ref 0.76–1.27)
EGFRCR SERPLBLD CKD-EPI 2021: 38.8 ML/MIN/1.73
GLUCOSE SERPL-MCNC: 104 MG/DL (ref 65–99)
POTASSIUM SERPL-SCNC: 3.9 MMOL/L (ref 3.5–5.2)
SODIUM SERPL-SCNC: 135 MMOL/L (ref 136–145)

## 2024-08-26 PROCEDURE — 80048 BASIC METABOLIC PNL TOTAL CA: CPT

## 2024-08-28 ENCOUNTER — TELEPHONE (OUTPATIENT)
Dept: CARDIOLOGY | Facility: HOSPITAL | Age: 89
End: 2024-08-28
Payer: MEDICARE

## 2024-08-28 ENCOUNTER — TELEPHONE (OUTPATIENT)
Dept: INTERNAL MEDICINE | Facility: CLINIC | Age: 89
End: 2024-08-28
Payer: MEDICARE

## 2024-08-28 ENCOUNTER — TELEPHONE (OUTPATIENT)
Dept: CARDIOLOGY | Facility: CLINIC | Age: 89
End: 2024-08-28
Payer: MEDICARE

## 2024-08-28 NOTE — TELEPHONE ENCOUNTER
"  Caller: Agapito Downey \"Juan\"    Relationship: Self    Best call back number: [phone]    What is the best time to reach you: ANYTIME     Who are you requesting to speak with (clinical staff, provider,  specific staff member): ANYONE     What was the call regarding: PATIENT WOULD LIKE A CALL BACK IN REGARDS TO THE BLOOD WORK THAT HE HAD DONE. SAYS IT WAS ORDERED BY .       "

## 2024-08-28 NOTE — TELEPHONE ENCOUNTER
"    Caller: Agapito Downey \"Aleyda\"    Relationship: Self    Best call back number: [phone]     Caller requesting test results: ALEYDA    What test was performed: LABS     When was the test performed: [id number]    Where was the test performed: [facility]     Additional notes: CALLING FOR THE RESULTS.         "

## 2024-08-28 NOTE — TELEPHONE ENCOUNTER
I spoke to patient and informed him that Dr. Shar Jamison was the one that ordered his labs so he would have to call his office. Patient verbalized understanding.

## 2024-09-08 DIAGNOSIS — I50.31 ACUTE DIASTOLIC CHF (CONGESTIVE HEART FAILURE): ICD-10-CM

## 2024-09-09 DIAGNOSIS — I10 ESSENTIAL HYPERTENSION: ICD-10-CM

## 2024-09-09 RX ORDER — BISOPROLOL FUMARATE 5 MG/1
TABLET, FILM COATED ORAL
Qty: 135 TABLET | Refills: 0 | Status: SHIPPED | OUTPATIENT
Start: 2024-09-09

## 2024-09-09 RX ORDER — POTASSIUM CHLORIDE 750 MG/1
10 TABLET, EXTENDED RELEASE ORAL 2 TIMES DAILY
Qty: 60 TABLET | Refills: 2 | Status: SHIPPED | OUTPATIENT
Start: 2024-09-09

## 2024-09-18 RX ORDER — METFORMIN HCL 500 MG
500 TABLET, EXTENDED RELEASE 24 HR ORAL DAILY
Qty: 90 TABLET | Refills: 1 | Status: SHIPPED | OUTPATIENT
Start: 2024-09-18

## 2024-09-30 DIAGNOSIS — I50.32 CHRONIC HEART FAILURE WITH PRESERVED EJECTION FRACTION (HFPEF): ICD-10-CM

## 2024-09-30 RX ORDER — EMPAGLIFLOZIN 10 MG/1
10 TABLET, FILM COATED ORAL DAILY
Qty: 90 TABLET | Refills: 0 | Status: SHIPPED | OUTPATIENT
Start: 2024-09-30

## 2024-10-16 ENCOUNTER — TELEPHONE (OUTPATIENT)
Dept: INTERNAL MEDICINE | Facility: CLINIC | Age: 89
End: 2024-10-16
Payer: MEDICARE

## 2024-10-16 ENCOUNTER — SPECIALTY PHARMACY (OUTPATIENT)
Dept: CARDIOLOGY | Facility: CLINIC | Age: 89
End: 2024-10-16
Payer: MEDICARE

## 2024-10-16 NOTE — TELEPHONE ENCOUNTER
"Caller: Agapito Downey \"Juan\"    Relationship to patient: Self    Best call back number: [phone]     Chief complaint: NEROPATHY PAIN AT NIGHT KEEPING PATIENT AWAKE AND HEART BURN    Type of visit: OFFICE    Requested date: THIS WEEK IF POSSIBLE     Additional notes: PATIENT WOULD LIKE TO SEE DR NASCIMENTO IF AT ALL POSSIBLE. NEXT AVAILABLE APPOINTMENT ISN'T UNTIL JANUARY.  "

## 2024-10-22 ENCOUNTER — OFFICE VISIT (OUTPATIENT)
Dept: INTERNAL MEDICINE | Facility: CLINIC | Age: 89
End: 2024-10-22
Payer: MEDICARE

## 2024-10-22 VITALS
TEMPERATURE: 97.9 F | SYSTOLIC BLOOD PRESSURE: 120 MMHG | BODY MASS INDEX: 27.95 KG/M2 | OXYGEN SATURATION: 98 % | WEIGHT: 184.4 LBS | HEIGHT: 68 IN | DIASTOLIC BLOOD PRESSURE: 70 MMHG | HEART RATE: 72 BPM

## 2024-10-22 DIAGNOSIS — N18.32 STAGE 3B CHRONIC KIDNEY DISEASE: ICD-10-CM

## 2024-10-22 DIAGNOSIS — K21.9 GASTROESOPHAGEAL REFLUX DISEASE WITHOUT ESOPHAGITIS: ICD-10-CM

## 2024-10-22 DIAGNOSIS — R79.89 ABNORMAL BLOOD CREATININE LEVEL: ICD-10-CM

## 2024-10-22 DIAGNOSIS — E11.42 DIABETIC POLYNEUROPATHY ASSOCIATED WITH TYPE 2 DIABETES MELLITUS: Primary | ICD-10-CM

## 2024-10-22 PROCEDURE — 1160F RVW MEDS BY RX/DR IN RCRD: CPT | Performed by: INTERNAL MEDICINE

## 2024-10-22 PROCEDURE — 1126F AMNT PAIN NOTED NONE PRSNT: CPT | Performed by: INTERNAL MEDICINE

## 2024-10-22 PROCEDURE — G2211 COMPLEX E/M VISIT ADD ON: HCPCS | Performed by: INTERNAL MEDICINE

## 2024-10-22 PROCEDURE — 1159F MED LIST DOCD IN RCRD: CPT | Performed by: INTERNAL MEDICINE

## 2024-10-22 PROCEDURE — 99214 OFFICE O/P EST MOD 30 MIN: CPT | Performed by: INTERNAL MEDICINE

## 2024-10-22 RX ORDER — GABAPENTIN 300 MG/1
300 CAPSULE ORAL NIGHTLY
Qty: 30 CAPSULE | Refills: 2 | Status: SHIPPED | OUTPATIENT
Start: 2024-10-22

## 2024-10-22 NOTE — PROGRESS NOTES
Answers submitted by the patient for this visit:  Primary Reason for Visit (Submitted on 10/21/2024)  What is the primary reason for your visit?: Extremity Pain  Lower Extremity Injury Questionnaire (Submitted on 10/21/2024)  Chief Complaint: Extremity pain  Injury: No  Pain location: right foot  Pain quality: burning  Pain - numeric: 5/10  Progression since onset: comes and goes  tingling: Yes  inability to bear weight: No  lower extremity swelling: No  redness: No  [facility]     Agapito Downey  1935   [id number]      Patient Care Team:  Kendrick Oconnor MD as PCP - General  Federico Campbell MD as Consulting Physician (Cardiology)  Shar Jamison III, MD as Cardiologist (Cardiology)    Chief Complaint::   Chief Complaint   Patient presents with    Peripheral Neuropathy    GI Problem        HPI  History of Present Illness  The patient is an 89-year-old male who presents for evaluation of peripheral neuropathy.    He reports experiencing severe neuropathy, particularly in his right foot, which he describes as a burning sensation. This discomfort is most pronounced at night and seems to be triggered when he sits down to watch TV. Despite attempts to alleviate the pain by changing his leg position or standing up, he finds little relief. However, walking and massaging the affected area seem to provide some comfort. He reports no weakness in his leg or difficulty walking. His sleep is often disrupted due to the pain. He expresses concern about potential side effects of medication and possible interactions with his current medications.    He continues to experience acid reflux, albeit less severe than before. He attempted to manage this with Prilosec about 2 to 3 weeks ago, but found it less effective than it was in July 2024. He has made dietary changes, such as reducing his intake of lemonade and orange juice, and avoiding late-night meals. Despite these efforts, he still  experiences symptoms. He is open to resuming Prilosec, but is aware of the 's advice not to take it continuously for more than 14 days. He took five doses of Prilosec at various times in September 2024, but did not find it helpful. He recalls that when he first took it in July 2024, it was effective within 2 to 3 days.    He has been experiencing a loss of taste for the past few months, which he finds concerning. He has not had COVID-19 and wonders if this could be a side effect of his medications, although he has been on them for a long time.      Chronic Conditions:      Patient Active Problem List   Diagnosis    Coronary artery disease involving coronary bypass graft of native heart without angina pectoris    AV block, 1st degree    Essential hypertension    Dyslipidemia    Osteoarthritis    Other fatigue    Allergic rhinitis    ASHD (arteriosclerotic heart disease)    Angina pectoris    History of malignant neoplasm of prostate    Snoring    Irritable bowel syndrome    Mobitz type 1 second degree AV block    SSS (sick sinus syndrome)    Acquired hypothyroidism    Diabetic polyneuropathy associated with type 2 diabetes mellitus    Chronic insomnia    KALA (obstructive sleep apnea)        Past Medical History:   Diagnosis Date    Anxiety Aug 2023    AV bloc first degree     CAD (coronary artery disease)     Cancer     prostate cancer seed implantion; Brachytherapy 2006, PSA has since been undetectable    CHF (congestive heart failure) Dec 2023    Cholelithiasis ?    Diverticulosis     Dyslipidemia     Hyperlipidemia     Hypertension     benign    Myocardial infarction 1994    Nephrolithiasis 09/20/2014    Osteoarthritis        Past Surgical History:   Procedure Laterality Date    CARDIAC ELECTROPHYSIOLOGY PROCEDURE N/A 12/04/2019    Procedure: Device Implant     PPM IMPLANT;  Surgeon: Federico Campbell MD;  Location: [facility] INVASIVE LOCATION;  Service: Cardiology    CHOLECYSTECTOMY  1995     laparoscopic    CORONARY ARTERY BYPASS GRAFT  11/1994    INSERT / REPLACE / REMOVE PACEMAKER      INSERTION PROSTATE RADIATION SEED  2006    Cancer, prostate; PSA has since been undetectable    OTHER SURGICAL HISTORY      Prostate surgery with seed implantation.    TONSILLECTOMY  1943       Family History   Problem Relation Age of Onset    Heart disease Father     Coronary artery disease Brother         cause of death    Heart disease Brother     Heart disease Other        Social History     Socioeconomic History    Marital status:    Tobacco Use    Smoking status: Never     Passive exposure: Never    Smokeless tobacco: Never   Vaping Use    Vaping status: Never Used   Substance and Sexual Activity    Alcohol use: No    Drug use: No    Sexual activity: Not Currently     Partners: Female       Allergies   Allergen Reactions    Reserpine Other (See Comments)     depression    Statins Other (See Comments)     Myalgias and mood affect disorder  fatigue           Current Outpatient Medications:     bisoprolol (ZEBeta) 5 MG tablet, TAKE 1 AND 1/2 TABLET BY MOUTH EVERY NIGHT, Disp: 135 tablet, Rfl: 0    Cholecalciferol (Vitamin D3) 1.25 MG (35398 UT) capsule, TAKE 1 CAPSULE BY MOUTH ONCE WEEKLY, Disp: 12 capsule, Rfl: 1    Evolocumab (REPATHA) solution auto-injector SureClick injection, Inject 1 mL under the skin into the appropriate area as directed Every 14 (Fourteen) Days., Disp: 6 mL, Rfl: 3    furosemide (LASIX) 40 MG tablet, TAKE 1 TABLET BY MOUTH TWICE A DAY, Disp: 60 tablet, Rfl: 5    gabapentin (NEURONTIN) 300 MG capsule, Take 1 capsule by mouth Every Night., Disp: 30 capsule, Rfl: 2    Jardiance 10 MG tablet tablet, TAKE 1 TABLET BY MOUTH DAILY, Disp: 90 tablet, Rfl: 0    ketotifen (ZADITOR) 0.025 % ophthalmic solution, Apply 1 drop to eye(s) as directed by provider Daily As Needed., Disp: , Rfl:     levothyroxine (SYNTHROID, LEVOTHROID) 75 MCG tablet, TAKE ONE TABLET BY MOUTH ONE TIME DAILY, Disp: 90  "tablet, Rfl: 3    losartan (COZAAR) 50 MG tablet, Take 1 tablet by mouth Daily., Disp: 30 tablet, Rfl: 3    magnesium oxide (MAG-OX) 400 MG tablet, Take 1 tablet by mouth Daily., Disp: , Rfl:     metFORMIN ER (GLUCOPHAGE-XR) 500 MG 24 hr tablet, TAKE 1 TABLET BY MOUTH DAILY, Disp: 90 tablet, Rfl: 1    metOLazone (ZAROXOLYN) 2.5 MG tablet, Take 1 tablet by mouth As Needed (daily as needed for edema)., Disp: 30 tablet, Rfl: 1    Multiple Vitamins-Minerals (CENTRUM SILVER 50+MEN PO), Take 1 tablet by mouth Daily., Disp: , Rfl:     polyethyl glycol-propyl glycol (SYSTANE) 0.4-0.3 % solution ophthalmic solution, Apply 1 drop to eye(s) as directed by provider Daily As Needed., Disp: , Rfl:     potassium chloride (KLOR-CON M10) 10 MEQ CR tablet, TAKE 1 TABLET BY MOUTH 2 TIMES A DAY, Disp: 60 tablet, Rfl: 2    Probiotic Product (ALIGN) 4 MG capsule, Take 1 capsule by mouth Daily., Disp: , Rfl:     psyllium (METAMUCIL) 100 % pack packet, Take 1 packet by mouth Daily., Disp: , Rfl:     spironolactone (ALDACTONE) 25 MG tablet, TAKE ONE TABLET BY MOUTH EVERY OTHER DAY (Patient taking differently: Takes on even numbered days), Disp: 45 tablet, Rfl: 5    Review of Systems   Neurological:  Positive for numbness.        Vital Signs  Vitals:    10/22/24 1205   BP: 120/70   BP Location: Left arm   Patient Position: Sitting   Cuff Size: Adult   Pulse: 72   Temp: 97.9 °F (36.6 °C)   TempSrc: Infrared   SpO2: 98%   Weight: 83.6 kg (184 lb 6.4 oz)   Height: 171.5 cm (67.52\")   PainSc: 0-No pain       Physical Exam  Vitals reviewed.   Constitutional:       Appearance: Normal appearance. He is well-developed.   HENT:      Head: Normocephalic and atraumatic.   Neck:      Thyroid: No thyromegaly.   Cardiovascular:      Rate and Rhythm: Normal rate and regular rhythm.      Heart sounds: Normal heart sounds. No murmur heard.     No friction rub. No gallop.   Pulmonary:      Effort: Pulmonary effort is normal.      Breath sounds: Normal breath " sounds.   Musculoskeletal:      Cervical back: Neck supple.      Right lower leg: No edema.      Left lower leg: No edema.   Skin:     General: Skin is warm and dry.   Neurological:      Mental Status: He is alert and oriented to person, place, and time.      Cranial Nerves: No cranial nerve deficit.   Psychiatric:         Mood and Affect: Mood normal.         Behavior: Behavior normal.        Physical Exam      Procedures    ACE III MINI        Results  Laboratory Studies  Creatinine increased to 1.6.           Assessment/Plan:    Diagnoses and all orders for this visit:    1. Diabetic polyneuropathy associated with type 2 diabetes mellitus (Primary)  -     gabapentin (NEURONTIN) 300 MG capsule; Take 1 capsule by mouth Every Night.  Dispense: 30 capsule; Refill: 2    2. Abnormal blood creatinine level  -     Basic Metabolic Panel; Future  -     Cystatin C With Glomerular Filtration Rate, Estimated; Future    3. Stage 3b chronic kidney disease  -     Cystatin C With Glomerular Filtration Rate, Estimated; Future    4. Gastroesophageal reflux disease without esophagitis      Assessment & Plan  1. Diabetic neuropathy.  The unilateral nature of his current symptoms is curious, suggesting a possible radicular component. However, the treatment remains the same. He is started on gabapentin 300 mg at bedtime. He will notify in 1 week to adjust the dose if needed. Potential side effects, including daytime sedation, were discussed. If daytime sedation occurs, alternative treatments will be considered.    2. GERD.  He is instructed to take omeprazole 20 mg a day 15 minutes before breakfast every day. It might take 2 to 3 weeks to achieve the maximum effect. If symptoms persist after this period, a different medication will be considered. Dietary modifications, such as avoiding late-night snacks and acidic foods, were also discussed.    3. Loss of taste.  He reports a loss of taste for the past couple of months. No history of  COVID-19. The potential link to his medications was discussed, but no definitive cause was identified. Referral to an ENT specialist was offered but not deemed necessary at this time.        Plan of care reviewed with patient at the conclusion of today's visit. Education was provided regarding diagnosis, management, and any prescribed or recommended OTC medications.Patient verbalizes understanding of and agreement with management plan.     Patient or patient representative verbalized consent for the use of Ambient Listening during the visit with  Kendrick Oconnor MD for chart documentation. 10/26/2024  13:42 EDT        Kendrick Oconnor MD

## 2024-10-28 ENCOUNTER — LAB (OUTPATIENT)
Dept: LAB | Facility: HOSPITAL | Age: 89
End: 2024-10-28
Payer: MEDICARE

## 2024-10-28 DIAGNOSIS — N18.32 STAGE 3B CHRONIC KIDNEY DISEASE: ICD-10-CM

## 2024-10-28 DIAGNOSIS — R79.89 ABNORMAL BLOOD CREATININE LEVEL: ICD-10-CM

## 2024-10-28 LAB
ANION GAP SERPL CALCULATED.3IONS-SCNC: 12 MMOL/L (ref 5–15)
BUN SERPL-MCNC: 29 MG/DL (ref 8–23)
BUN/CREAT SERPL: 18.5 (ref 7–25)
CALCIUM SPEC-SCNC: 10.5 MG/DL (ref 8.6–10.5)
CHLORIDE SERPL-SCNC: 100 MMOL/L (ref 98–107)
CO2 SERPL-SCNC: 28 MMOL/L (ref 22–29)
CREAT SERPL-MCNC: 1.57 MG/DL (ref 0.76–1.27)
EGFRCR SERPLBLD CKD-EPI 2021: 42.4 ML/MIN/1.73
GLUCOSE SERPL-MCNC: 101 MG/DL (ref 65–99)
POTASSIUM SERPL-SCNC: 4 MMOL/L (ref 3.5–5.2)
SODIUM SERPL-SCNC: 140 MMOL/L (ref 136–145)

## 2024-10-28 PROCEDURE — 80048 BASIC METABOLIC PNL TOTAL CA: CPT

## 2024-10-28 PROCEDURE — 82610 CYSTATIN C: CPT

## 2024-10-28 PROCEDURE — 36415 COLL VENOUS BLD VENIPUNCTURE: CPT

## 2024-10-31 LAB
CYSTATIN C SERPL-MCNC: 1.69 MG/L (ref 0.87–1.12)
GFR/BSA.PRED SERPLBLD CYS-BASED-ARV: 35 ML/MIN/1.73

## 2024-11-02 DIAGNOSIS — N18.32 STAGE 3B CHRONIC KIDNEY DISEASE: Primary | ICD-10-CM

## 2024-11-04 ENCOUNTER — TELEPHONE (OUTPATIENT)
Dept: CARDIOLOGY | Facility: CLINIC | Age: 89
End: 2024-11-04
Payer: MEDICARE

## 2024-11-04 DIAGNOSIS — N18.32 STAGE 3B CHRONIC KIDNEY DISEASE: Primary | ICD-10-CM

## 2024-11-04 NOTE — TELEPHONE ENCOUNTER
Caller: DARIUS PIEDRA    Relationship to patient: SELF    Best call back number: [phone]    Chief complaint: FOOT PAIN, ACID REFLUX AND KIDNEY DAMAGE.     Type of visit: FOLLOW UP    Requested date: ASAP        Additional notes:

## 2024-11-04 NOTE — TELEPHONE ENCOUNTER
Requesting an appt. Rt foot pain for the past month.No injury. No discoloration.Burning pain. Went to PCP and was told maybe neuropathy. No testing done. Also,having acid reflux at night only and the Prilosec helps.Reports has kidney damage and wants to discuss his medications and what can be decreased. PCP referred him to a Nephrologist. Please advise.

## 2024-11-06 ENCOUNTER — OFFICE VISIT (OUTPATIENT)
Dept: CARDIOLOGY | Facility: HOSPITAL | Age: 89
End: 2024-11-06
Payer: MEDICARE

## 2024-11-06 VITALS
SYSTOLIC BLOOD PRESSURE: 131 MMHG | BODY MASS INDEX: 27.89 KG/M2 | HEART RATE: 62 BPM | DIASTOLIC BLOOD PRESSURE: 66 MMHG | RESPIRATION RATE: 16 BRPM | WEIGHT: 184 LBS | HEIGHT: 68 IN | TEMPERATURE: 97.1 F | OXYGEN SATURATION: 96 %

## 2024-11-06 DIAGNOSIS — E78.5 DYSLIPIDEMIA: ICD-10-CM

## 2024-11-06 DIAGNOSIS — I10 ESSENTIAL HYPERTENSION: ICD-10-CM

## 2024-11-06 DIAGNOSIS — I50.22 CHRONIC HEART FAILURE WITH MILDLY REDUCED EJECTION FRACTION (HFMREF, 41-49%): Primary | ICD-10-CM

## 2024-11-07 RX ORDER — FUROSEMIDE 40 MG/1
40 TABLET ORAL DAILY
Start: 2024-11-07

## 2024-11-07 NOTE — PROGRESS NOTES
"Chief Complaint  Congestive Heart Failure and Follow-up    Subjective    History of Present Illness {CC  Problem List  Visit  Diagnosis   Encounters  Notes  Medications  Labs  Result Review Imaging  Media :23}       History of Present Illness   89-year-old male presents the office today at the request of Dr. Jamison for ongoing evaluation of chronic hfpef.  Patient takes Lasix 40 mg twice daily, Aldactone 25 mg every other day and metolazone 2.5 mg as needed for a weight greater than 180 pounds.  He reports he has been sent to nephrology by Dr. Oconnor due to recent bump in creatinine.  Patient presents today with home weight of 176-180.  He reports he has had 2 doses of metolazone in the last 2 weeks.  Notes some intermittent right foot pain that Dr. Oconnor is working up.  Does report he is feeling well overall and currently denies chest pain, dyspnea, dizziness, presyncope or syncope.  Objective     Vital Signs:   Vitals:    11/06/24 1507   BP: 131/66   BP Location: Left arm   Patient Position: Sitting   Cuff Size: Adult   Pulse: 62   Resp: 16   Temp: 97.1 °F (36.2 °C)   TempSrc: Temporal   SpO2: 96%   Weight: 83.5 kg (184 lb)   Height: 171.5 cm (67.52\")     Body mass index is 28.38 kg/m².  Physical Exam  Vitals and nursing note reviewed.   Constitutional:       Appearance: Normal appearance.   HENT:      Head: Normocephalic.   Eyes:      Pupils: Pupils are equal, round, and reactive to light.   Cardiovascular:      Rate and Rhythm: Normal rate and regular rhythm.      Pulses: Normal pulses.      Heart sounds: Normal heart sounds. No murmur heard.  Pulmonary:      Effort: Pulmonary effort is normal.      Breath sounds: Normal breath sounds.   Abdominal:      General: Bowel sounds are normal.      Palpations: Abdomen is soft.   Musculoskeletal:         General: Normal range of motion.      Cervical back: Normal range of motion.      Right lower leg: No edema.      Left lower leg: No edema.   Skin:    "  General: Skin is warm and dry.      Capillary Refill: Capillary refill takes less than 2 seconds.   Neurological:      Mental Status: He is alert and oriented to person, place, and time.   Psychiatric:         Mood and Affect: Mood normal.         Thought Content: Thought content normal.              Result Review  Data Reviewed:{ Labs  Result Review  Imaging  Med Tab  Media :23}     Lab Results   Component Value Date    GLUCOSE 101 (H) 10/28/2024    CALCIUM 10.5 10/28/2024     10/28/2024    K 4.0 10/28/2024    CO2 28.0 10/28/2024     10/28/2024    BUN 29 (H) 10/28/2024    CREATININE 1.57 (H) 10/28/2024    EGFRRESULT 46.2 (L) 12/18/2023    EGFR 42.4 (L) 10/28/2024    EGFR 35 (L) 10/28/2024    BCR 18.5 10/28/2024    ANIONGAP 12.0 10/28/2024     Lab Results   Component Value Date    WBC 8.14 06/20/2024    HGB 15.1 06/20/2024    HCT 43.4 06/20/2024    MCV 94.6 06/20/2024     06/20/2024                Assessment and Plan {CC Problem List  Visit Diagnosis  ROS  Review (Popup)  Health Maintenance  Quality  BestPractice  Medications  SmartSets  SnapShot Encounters  Media :23}   1. 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  May continue to use as needed metolazone for weight greater than 180 pounds.  - Basic Metabolic Panel; Future in 1 week to 10 days  Heart failure education today including signs and symptoms, the role of the heart failure center, daily weights, low sodium diet (less than 1500 mg per day), and daily physical activity. Reviewed HF Zones with patient and family.  Patient to continue current medications as previously ordered.   2. Essential hypertension  Stable on bisoprolol, losartan  - furosemide (LASIX) 40 MG tablet; Take 1 tablet by mouth Daily.    3. Dyslipidemia  Stable on Repatha          Follow Up {Instructions Charge Capture  Follow-up  Communications :23}   Return if symptoms worsen or fail to improve.    Patient was given instructions and counseling regarding his condition or for health maintenance advice. Please see specific information pulled into the AVS if appropriate.  Patient was instructed to call the [facility] with any questions, concerns, or worsening symptoms.

## 2024-11-21 ENCOUNTER — HOSPITAL ENCOUNTER (OUTPATIENT)
Dept: ULTRASOUND IMAGING | Facility: HOSPITAL | Age: 89
Discharge: HOME | End: 2024-11-21
Admitting: INTERNAL MEDICINE
Payer: MEDICARE

## 2024-11-21 ENCOUNTER — SPECIALTY PHARMACY (OUTPATIENT)
Dept: GENERAL RADIOLOGY | Facility: HOSPITAL | Age: 89
End: 2024-11-21
Payer: MEDICARE

## 2024-11-21 ENCOUNTER — SPECIALTY PHARMACY (OUTPATIENT)
Dept: CARDIOLOGY | Facility: CLINIC | Age: 89
End: 2024-11-21
Payer: MEDICARE

## 2024-11-21 DIAGNOSIS — N18.32 STAGE 3B CHRONIC KIDNEY DISEASE: ICD-10-CM

## 2024-11-21 PROCEDURE — 76775 US EXAM ABDO BACK WALL LIM: CPT

## 2024-11-21 NOTE — PROGRESS NOTES
"Specialty Pharmacy Refill Coordination Note     Agapito \"Juan\" is a 89 y.o. male contacted today regarding refills of Repatha 140 mg SC every 14 days specialty medication(s).    Delivery scheduled for tomorrow,    Specialty medication(s) and dose(s) confirmed: yes        Delivery Questions      Flowsheet Row Most Recent Value   Delivery method UPS   Delivery address verified with patient/caregiver? Yes   Delivery address Home   Number of medications in delivery 1   Medication(s) being filled and delivered No medications found.   [REPATHA SURECLICK]   Doses left of specialty medications 1 pen   Copay verified? Yes   Copay amount 0.00   Copay form of payment No copayment ($0)   Ship Date 11/21/24   Delivery Date 11/22/24   Signature Required No                   Follow-up: 28 day(s)     Danni Cheung, Pharmacy Technician  Specialty Pharmacy Technician        "

## 2024-11-21 NOTE — PROGRESS NOTES
Specialty Pharmacy Patient Management Program  Per Protocol Prescription Order/Refill     Patient currently fills medications at [facility] and is enrolled in an Cardiology Patient Management Program.     Requested Prescriptions     Pending Prescriptions Disp Refills    Evolocumab (REPATHA) solution auto-injector SureClick injection 6 mL 3     Sig: Inject 1 mL under the skin into the appropriate area as directed Every 14 (Fourteen) Days.     Prescription orders above were sent to the pharmacy per Collaborative Care Agreement Protocol.     Jesse Rojas, PharmD  Clinical Specialty Pharmacist  11/21/2024  13:33 EST

## 2024-12-06 DIAGNOSIS — I50.31 ACUTE DIASTOLIC CHF (CONGESTIVE HEART FAILURE): ICD-10-CM

## 2024-12-06 DIAGNOSIS — I10 ESSENTIAL HYPERTENSION: ICD-10-CM

## 2024-12-06 RX ORDER — POTASSIUM CHLORIDE 750 MG/1
10 TABLET, EXTENDED RELEASE ORAL 2 TIMES DAILY
Qty: 60 TABLET | Refills: 2 | Status: SHIPPED | OUTPATIENT
Start: 2024-12-06

## 2024-12-06 RX ORDER — BISOPROLOL FUMARATE 5 MG/1
TABLET, FILM COATED ORAL
Qty: 135 TABLET | Refills: 0 | Status: SHIPPED | OUTPATIENT
Start: 2024-12-06

## 2024-12-09 DIAGNOSIS — E11.42 DIABETIC POLYNEUROPATHY ASSOCIATED WITH TYPE 2 DIABETES MELLITUS: ICD-10-CM

## 2024-12-09 DIAGNOSIS — E78.5 DYSLIPIDEMIA: Primary | ICD-10-CM

## 2024-12-09 DIAGNOSIS — R53.83 OTHER FATIGUE: ICD-10-CM

## 2024-12-09 DIAGNOSIS — I10 ESSENTIAL HYPERTENSION: Chronic | ICD-10-CM

## 2024-12-12 ENCOUNTER — LAB (OUTPATIENT)
Dept: LAB | Facility: HOSPITAL | Age: 89
End: 2024-12-12
Payer: MEDICARE

## 2024-12-12 ENCOUNTER — TRANSCRIBE ORDERS (OUTPATIENT)
Dept: LAB | Facility: HOSPITAL | Age: 89
End: 2024-12-12
Payer: MEDICARE

## 2024-12-12 DIAGNOSIS — R53.83 OTHER FATIGUE: ICD-10-CM

## 2024-12-12 DIAGNOSIS — N18.30 STAGE 3 CHRONIC KIDNEY DISEASE, UNSPECIFIED WHETHER STAGE 3A OR 3B CKD: Primary | ICD-10-CM

## 2024-12-12 DIAGNOSIS — E11.42 DIABETIC POLYNEUROPATHY ASSOCIATED WITH TYPE 2 DIABETES MELLITUS: ICD-10-CM

## 2024-12-12 DIAGNOSIS — I50.22 CHRONIC HEART FAILURE WITH MILDLY REDUCED EJECTION FRACTION (HFMREF, 41-49%): ICD-10-CM

## 2024-12-12 DIAGNOSIS — I10 ESSENTIAL HYPERTENSION: ICD-10-CM

## 2024-12-12 DIAGNOSIS — E78.5 DYSLIPIDEMIA: ICD-10-CM

## 2024-12-12 DIAGNOSIS — N18.30 STAGE 3 CHRONIC KIDNEY DISEASE, UNSPECIFIED WHETHER STAGE 3A OR 3B CKD: ICD-10-CM

## 2024-12-12 LAB
25(OH)D3 SERPL-MCNC: 96.2 NG/ML (ref 30–100)
ALBUMIN SERPL-MCNC: 4.1 G/DL (ref 3.5–5.2)
ALBUMIN UR-MCNC: <1.2 MG/DL
ALBUMIN/GLOB SERPL: 1.1 G/DL
ALP SERPL-CCNC: 41 U/L (ref 39–117)
ALT SERPL W P-5'-P-CCNC: 9 U/L (ref 1–41)
ANION GAP SERPL CALCULATED.3IONS-SCNC: 16.9 MMOL/L (ref 5–15)
AST SERPL-CCNC: 13 U/L (ref 1–40)
BACTERIA UR QL AUTO: NORMAL /HPF
BASOPHILS # BLD AUTO: 0.06 10*3/MM3 (ref 0–0.2)
BASOPHILS NFR BLD AUTO: 0.8 % (ref 0–1.5)
BILIRUB SERPL-MCNC: 0.6 MG/DL (ref 0–1.2)
BILIRUB UR QL STRIP: NEGATIVE
BUN SERPL-MCNC: 30 MG/DL (ref 8–23)
BUN/CREAT SERPL: 17.5 (ref 7–25)
CALCIUM SPEC-SCNC: 10.5 MG/DL (ref 8.6–10.5)
CHLORIDE SERPL-SCNC: 96 MMOL/L (ref 98–107)
CHOLEST SERPL-MCNC: 212 MG/DL (ref 0–200)
CLARITY UR: CLEAR
CO2 SERPL-SCNC: 26.1 MMOL/L (ref 22–29)
COLOR UR: YELLOW
CREAT SERPL-MCNC: 1.71 MG/DL (ref 0.76–1.27)
CREAT UR-MCNC: 113.8 MG/DL
DEPRECATED RDW RBC AUTO: 44.4 FL (ref 37–54)
EGFRCR SERPLBLD CKD-EPI 2021: 38.3 ML/MIN/1.73
EOSINOPHIL # BLD AUTO: 0.28 10*3/MM3 (ref 0–0.4)
EOSINOPHIL NFR BLD AUTO: 3.7 % (ref 0.3–6.2)
ERYTHROCYTE [DISTWIDTH] IN BLOOD BY AUTOMATED COUNT: 12.7 % (ref 12.3–15.4)
GLOBULIN UR ELPH-MCNC: 3.7 GM/DL
GLUCOSE SERPL-MCNC: 137 MG/DL (ref 65–99)
GLUCOSE UR STRIP-MCNC: ABNORMAL MG/DL
HBA1C MFR BLD: 6.1 % (ref 4.8–5.6)
HCT VFR BLD AUTO: 44.6 % (ref 37.5–51)
HDLC SERPL-MCNC: 44 MG/DL (ref 40–60)
HGB BLD-MCNC: 15.1 G/DL (ref 13–17.7)
HGB UR QL STRIP.AUTO: NEGATIVE
HYALINE CASTS UR QL AUTO: NORMAL /LPF
IMM GRANULOCYTES # BLD AUTO: 0.02 10*3/MM3 (ref 0–0.05)
IMM GRANULOCYTES NFR BLD AUTO: 0.3 % (ref 0–0.5)
KETONES UR QL STRIP: ABNORMAL
LDLC SERPL CALC-MCNC: 111 MG/DL (ref 0–100)
LDLC/HDLC SERPL: 2.31 {RATIO}
LEUKOCYTE ESTERASE UR QL STRIP.AUTO: NEGATIVE
LYMPHOCYTES # BLD AUTO: 2.08 10*3/MM3 (ref 0.7–3.1)
LYMPHOCYTES NFR BLD AUTO: 27.1 % (ref 19.6–45.3)
MCH RBC QN AUTO: 32.5 PG (ref 26.6–33)
MCHC RBC AUTO-ENTMCNC: 33.9 G/DL (ref 31.5–35.7)
MCV RBC AUTO: 95.9 FL (ref 79–97)
MICROALBUMIN/CREAT UR: NORMAL MG/G{CREAT}
MONOCYTES # BLD AUTO: 0.74 10*3/MM3 (ref 0.1–0.9)
MONOCYTES NFR BLD AUTO: 9.6 % (ref 5–12)
NEUTROPHILS NFR BLD AUTO: 4.49 10*3/MM3 (ref 1.7–7)
NEUTROPHILS NFR BLD AUTO: 58.5 % (ref 42.7–76)
NITRITE UR QL STRIP: NEGATIVE
NRBC BLD AUTO-RTO: 0 /100 WBC (ref 0–0.2)
PH UR STRIP.AUTO: 6.5 [PH] (ref 5–8)
PHOSPHATE SERPL-MCNC: 3.7 MG/DL (ref 2.5–4.5)
PLATELET # BLD AUTO: 311 10*3/MM3 (ref 140–450)
PMV BLD AUTO: 10.2 FL (ref 6–12)
POTASSIUM SERPL-SCNC: 3.9 MMOL/L (ref 3.5–5.2)
PROT ?TM UR-MCNC: 6.3 MG/DL
PROT SERPL-MCNC: 7.8 G/DL (ref 6–8.5)
PROT UR QL STRIP: NEGATIVE
PTH-INTACT SERPL-MCNC: 19 PG/ML (ref 15–65)
RBC # BLD AUTO: 4.65 10*6/MM3 (ref 4.14–5.8)
RBC # UR STRIP: NORMAL /HPF
REF LAB TEST METHOD: NORMAL
SODIUM SERPL-SCNC: 139 MMOL/L (ref 136–145)
SP GR UR STRIP: 1.02 (ref 1–1.03)
SQUAMOUS #/AREA URNS HPF: NORMAL /HPF
TRIGL SERPL-MCNC: 332 MG/DL (ref 0–150)
TSH SERPL DL<=0.05 MIU/L-ACNC: 3.71 UIU/ML (ref 0.27–4.2)
URATE SERPL-MCNC: 8.3 MG/DL (ref 3.4–7)
UROBILINOGEN UR QL STRIP: ABNORMAL
VLDLC SERPL-MCNC: 57 MG/DL (ref 5–40)
WBC # UR STRIP: NORMAL /HPF
WBC NRBC COR # BLD AUTO: 7.67 10*3/MM3 (ref 3.4–10.8)

## 2024-12-12 PROCEDURE — 82306 VITAMIN D 25 HYDROXY: CPT

## 2024-12-12 PROCEDURE — 84156 ASSAY OF PROTEIN URINE: CPT

## 2024-12-12 PROCEDURE — 81001 URINALYSIS AUTO W/SCOPE: CPT

## 2024-12-12 PROCEDURE — 84550 ASSAY OF BLOOD/URIC ACID: CPT

## 2024-12-12 PROCEDURE — 84100 ASSAY OF PHOSPHORUS: CPT

## 2024-12-12 PROCEDURE — 83970 ASSAY OF PARATHORMONE: CPT

## 2024-12-12 PROCEDURE — 85025 COMPLETE CBC W/AUTO DIFF WBC: CPT

## 2024-12-12 PROCEDURE — 36415 COLL VENOUS BLD VENIPUNCTURE: CPT

## 2024-12-12 PROCEDURE — 82570 ASSAY OF URINE CREATININE: CPT

## 2024-12-12 PROCEDURE — 80061 LIPID PANEL: CPT

## 2024-12-12 PROCEDURE — 84443 ASSAY THYROID STIM HORMONE: CPT

## 2024-12-12 PROCEDURE — 80053 COMPREHEN METABOLIC PANEL: CPT

## 2024-12-12 PROCEDURE — 83036 HEMOGLOBIN GLYCOSYLATED A1C: CPT

## 2024-12-12 PROCEDURE — 82043 UR ALBUMIN QUANTITATIVE: CPT

## 2024-12-16 ENCOUNTER — OFFICE VISIT (OUTPATIENT)
Dept: INTERNAL MEDICINE | Facility: CLINIC | Age: 89
End: 2024-12-16
Payer: MEDICARE

## 2024-12-16 VITALS
SYSTOLIC BLOOD PRESSURE: 110 MMHG | WEIGHT: 184 LBS | TEMPERATURE: 97.8 F | HEART RATE: 87 BPM | DIASTOLIC BLOOD PRESSURE: 60 MMHG | HEIGHT: 68 IN | OXYGEN SATURATION: 94 % | BODY MASS INDEX: 27.89 KG/M2

## 2024-12-16 DIAGNOSIS — E11.42 DIABETIC POLYNEUROPATHY ASSOCIATED WITH TYPE 2 DIABETES MELLITUS: Primary | ICD-10-CM

## 2024-12-16 DIAGNOSIS — E03.9 ACQUIRED HYPOTHYROIDISM: ICD-10-CM

## 2024-12-16 DIAGNOSIS — E78.5 DYSLIPIDEMIA: ICD-10-CM

## 2024-12-16 DIAGNOSIS — I10 ESSENTIAL HYPERTENSION: Chronic | ICD-10-CM

## 2024-12-16 PROCEDURE — 1160F RVW MEDS BY RX/DR IN RCRD: CPT | Performed by: INTERNAL MEDICINE

## 2024-12-16 PROCEDURE — 99214 OFFICE O/P EST MOD 30 MIN: CPT | Performed by: INTERNAL MEDICINE

## 2024-12-16 PROCEDURE — 1126F AMNT PAIN NOTED NONE PRSNT: CPT | Performed by: INTERNAL MEDICINE

## 2024-12-16 PROCEDURE — 1159F MED LIST DOCD IN RCRD: CPT | Performed by: INTERNAL MEDICINE

## 2024-12-16 PROCEDURE — G2211 COMPLEX E/M VISIT ADD ON: HCPCS | Performed by: INTERNAL MEDICINE

## 2024-12-16 RX ORDER — SPIRONOLACTONE 25 MG/1
25 TABLET ORAL EVERY OTHER DAY
Qty: 45 TABLET | Refills: 5 | Status: SHIPPED | OUTPATIENT
Start: 2024-12-16

## 2024-12-16 NOTE — PROGRESS NOTES
[facility]     Agapito Downey  1935   [id number]      Patient Care Team:  Kendrick Oconnor MD as PCP - General  Federico Campbell MD as Consulting Physician (Cardiology)  Shar Jamison III, MD as Cardiologist (Cardiology)  Robbi Milton MD as Consulting Physician (Nephrology)    Chief Complaint::   Chief Complaint   Patient presents with    Labs     6mo fasting        HPI  History of Present Illness  The patient is an 89-year-old male who comes in for follow-up of diabetes, hypertension, dyslipidemia, and hypothyroidism. He sees Cardiology for coronary artery disease and Nephrology for chronic kidney disease.    He has been diagnosed with chronic kidney disease. He was unable to secure an appointment with Dr. Dey until January or February, so he consulted with Dr. Rankin instead. Dr. Rankin attributed his kidney function decline to his age rather than the disease itself. An ultrasound was performed, and he was informed that if there were any concerns, he would be contacted. However, no contact has been made, leading him to believe there are no issues. He is seeking advice on the occasional use of ibuprofen, as he has been advised against its regular use.    He was prescribed gabapentin for foot pain but has not required it since the last consultation a few months ago. He expresses apprehension about taking gabapentin and will only consider it if the pain recurs.    He has observed elevated glucose levels in his lab results, although his A1c has remained stable. He is questioning whether he has diabetes. He has read about a new medication developed in [place] called HP-15, which is purported to be superior to metformin.    He is currently taking spironolactone every other day and requires a refill. He requests that the prescription be sent to [facility] pharmacy.    He has expressed concern about his cholesterol levels, which have been consistently high. He is currently on  Repatha, which had previously managed his cholesterol levels effectively. However, he has recently noticed an increase in his levels despite no changes in his diet.    He has read online that melatonin should not be taken continuously.    MEDICATIONS  Current: Spironolactone, losartan, Repatha, levothyroxine, melatonin, metformin, gabapentin (not started).      Chronic Conditions:      Patient Active Problem List   Diagnosis    Coronary artery disease involving coronary bypass graft of native heart without angina pectoris    AV block, 1st degree    Essential hypertension    Dyslipidemia    Osteoarthritis    Other fatigue    Allergic rhinitis    ASHD (arteriosclerotic heart disease)    Angina pectoris    History of malignant neoplasm of prostate    Snoring    Irritable bowel syndrome    Mobitz type 1 second degree AV block    SSS (sick sinus syndrome)    Acquired hypothyroidism    Diabetic polyneuropathy associated with type 2 diabetes mellitus    Chronic insomnia    KALA (obstructive sleep apnea)        Past Medical History:   Diagnosis Date    Anxiety Aug 2023    AV bloc first degree     CAD (coronary artery disease)     Cancer     prostate cancer seed implantion; Brachytherapy 2006, PSA has since been undetectable    CHF (congestive heart failure) Dec 2023    Cholelithiasis ?    Diverticulosis     Dyslipidemia     GERD (gastroesophageal reflux disease)     Hyperlipidemia     Hypertension     benign    Hypothyroidism     Myocardial infarction 1994    Nephrolithiasis 09/20/2014    Osteoarthritis     Renal insufficiency        Past Surgical History:   Procedure Laterality Date    CARDIAC ELECTROPHYSIOLOGY PROCEDURE N/A 12/04/2019    Procedure: Device Implant     PPM IMPLANT;  Surgeon: Federico Campbell MD;  Location: [facility] CATH INVASIVE LOCATION;  Service: Cardiology    CHOLECYSTECTOMY  1995    laparoscopic    COLONOSCOPY      CORONARY ARTERY BYPASS GRAFT  11/1994    INSERT / REPLACE / REMOVE PACEMAKER       INSERTION PROSTATE RADIATION SEED  2006    Cancer, prostate; PSA has since been undetectable    OTHER SURGICAL HISTORY      Prostate surgery with seed implantation.    PROSTATE SURGERY  2006    radioactive seeds installed    TONSILLECTOMY  1943       Family History   Problem Relation Age of Onset    Heart disease Father     Coronary artery disease Brother         cause of death    Heart disease Brother     Heart disease Other        Social History     Socioeconomic History    Marital status:    Tobacco Use    Smoking status: Never     Passive exposure: Never    Smokeless tobacco: Never   Vaping Use    Vaping status: Never Used   Substance and Sexual Activity    Alcohol use: No    Drug use: No    Sexual activity: Not Currently     Partners: Female       Allergies   Allergen Reactions    Reserpine Other (See Comments)     depression    Statins Other (See Comments)     Myalgias and mood affect disorder  fatigue           Current Outpatient Medications:     bisoprolol (ZEBeta) 5 MG tablet, TAKE 1.5 TABLETS BY MOUTH DAILY AT NIGHT, Disp: 135 tablet, Rfl: 0    Cholecalciferol (Vitamin D3) 1.25 MG (00304 UT) capsule, TAKE 1 CAPSULE BY MOUTH ONCE WEEKLY, Disp: 12 capsule, Rfl: 1    Evolocumab (REPATHA) solution auto-injector SureClick injection, Inject 1 mL under the skin into the appropriate area as directed Every 14 (Fourteen) Days., Disp: 6 mL, Rfl: 3    furosemide (LASIX) 40 MG tablet, Take 1 tablet by mouth Daily., Disp: , Rfl:     gabapentin (NEURONTIN) 300 MG capsule, Take 1 capsule by mouth Every Night., Disp: 30 capsule, Rfl: 2    Jardiance 10 MG tablet tablet, TAKE 1 TABLET BY MOUTH DAILY, Disp: 90 tablet, Rfl: 0    ketotifen (ZADITOR) 0.025 % ophthalmic solution, Apply 1 drop to eye(s) as directed by provider Daily As Needed., Disp: , Rfl:     levothyroxine (SYNTHROID, LEVOTHROID) 75 MCG tablet, TAKE ONE TABLET BY MOUTH ONE TIME DAILY, Disp: 90 tablet, Rfl: 3    losartan (COZAAR) 50 MG tablet, Take 1  "tablet by mouth Daily., Disp: 30 tablet, Rfl: 3    magnesium oxide (MAG-OX) 400 MG tablet, Take 1 tablet by mouth Daily., Disp: , Rfl:     metFORMIN ER (GLUCOPHAGE-XR) 500 MG 24 hr tablet, TAKE 1 TABLET BY MOUTH DAILY, Disp: 90 tablet, Rfl: 1    metOLazone (ZAROXOLYN) 2.5 MG tablet, Take 1 tablet by mouth As Needed (daily as needed for edema)., Disp: 30 tablet, Rfl: 1    Multiple Vitamins-Minerals (CENTRUM SILVER 50+MEN PO), Take 1 tablet by mouth Daily., Disp: , Rfl:     polyethyl glycol-propyl glycol (SYSTANE) 0.4-0.3 % solution ophthalmic solution, Apply 1 drop to eye(s) as directed by provider Daily As Needed., Disp: , Rfl:     potassium chloride (KLOR-CON M10) 10 MEQ CR tablet, TAKE 1 TABLET BY MOUTH 2 TIMES A DAY, Disp: 60 tablet, Rfl: 2    Probiotic Product (ALIGN) 4 MG capsule, Take 1 capsule by mouth Daily., Disp: , Rfl:     psyllium (METAMUCIL) 100 % pack packet, Take 1 packet by mouth Daily., Disp: , Rfl:     spironolactone (ALDACTONE) 25 MG tablet, Take 1 tablet by mouth Every Other Day., Disp: 45 tablet, Rfl: 5    Review of Systems   Constitutional: Negative.    Respiratory: Negative.  Negative for chest tightness and shortness of breath.    Cardiovascular: Negative.  Negative for chest pain.   Gastrointestinal:  Negative for abdominal pain, blood in stool, constipation and diarrhea.        Vital Signs  Vitals:    12/16/24 1018   BP: 110/60   BP Location: Left arm   Patient Position: Sitting   Cuff Size: Adult   Pulse: 87   Temp: 97.8 °F (36.6 °C)   TempSrc: Infrared   SpO2: 94%   Weight: 83.5 kg (184 lb)   Height: 171.5 cm (67.52\")       Physical Exam  Vitals reviewed.   Constitutional:       Appearance: Normal appearance. He is well-developed.   HENT:      Head: Normocephalic and atraumatic.   Neck:      Thyroid: No thyromegaly.      Vascular: No carotid bruit.   Cardiovascular:      Rate and Rhythm: Normal rate and regular rhythm.      Heart sounds: Normal heart sounds. No murmur heard.     No " friction rub. No gallop.   Pulmonary:      Effort: Pulmonary effort is normal.      Breath sounds: Normal breath sounds.   Musculoskeletal:      Cervical back: Normal range of motion and neck supple.      Right lower leg: No edema.      Left lower leg: No edema.   Lymphadenopathy:      Cervical: No cervical adenopathy.   Skin:     General: Skin is warm and dry.   Neurological:      Mental Status: He is alert and oriented to person, place, and time.      Cranial Nerves: No cranial nerve deficit.   Psychiatric:         Mood and Affect: Mood normal.         Behavior: Behavior normal.        Physical Exam      Procedures    ACE III MINI        Results  Laboratory Studies  GFR is consistent with known lab work. Blood glucose levels were 144 in June, then 122, 104, and 101. A1c was 6.6 in March and has decreased to 6.1. Uric acid is slightly high. Thyroid function is normal. Cholesterol levels are elevated, triglycerides are about usual levels. Blood counts are normal. Urine test is normal.    Imaging  Ultrasound of kidneys showed no surprises.           Assessment/Plan:    Diagnoses and all orders for this visit:    1. Diabetic polyneuropathy associated with type 2 diabetes mellitus (Primary)    2. Essential hypertension    3. Dyslipidemia    4. Acquired hypothyroidism    Other orders  -     spironolactone (ALDACTONE) 25 MG tablet; Take 1 tablet by mouth Every Other Day.  Dispense: 45 tablet; Refill: 5      Assessment & Plan  1. Diabetes mellitus.  His A1c levels have decreased to 6.1% with the administration of metformin. Over the past year, he has experienced a weight loss of approximately 20 pounds, partially attributable to diuresis. He will maintain his current dietary regimen and continue metformin therapy.    2. Hypertension.  His blood pressure is effectively managed with losartan.    3. Dyslipidemia.  There has been a slight increase in his LDL and triglyceride levels. He will persist with Repatha treatment and  adhere to a healthy diet.    4. Hypothyroidism.  His TSH levels are within the normal range. He is clinically euthyroid on the current dose of levothyroxine.    5. Chronic kidney disease.  His kidney function remains stable, and the decline is attributed to age rather than disease progression. He is advised to avoid long-term use of NSAIDs like ibuprofen and naproxen to prevent further kidney damage.    6. Neuropathy.  He has not needed to take gabapentin for his foot pain and is advised to only use it if necessary due to potential side effects like sedation.    7. Medication management.  A prescription for spironolactone will be sent to [facility] pharmacy.    8. Insomnia.  He is advised to continue taking melatonin for sleep as it is considered safe for long-term use.    Follow-up  The patient will follow up in 6 months.      Plan of care reviewed with patient at the conclusion of today's visit. Education was provided regarding diagnosis, management, and any prescribed or recommended OTC medications.Patient verbalizes understanding of and agreement with management plan.     Patient or patient representative verbalized consent for the use of Ambient Listening during the visit with  Kendrick Oconnor MD for chart documentation. 12/17/2024  10:19 EST        Kendrick Oconnor MD

## 2024-12-17 RX ORDER — METOLAZONE 2.5 MG/1
TABLET ORAL
Qty: 30 TABLET | Refills: 1 | Status: SHIPPED | OUTPATIENT
Start: 2024-12-17

## 2024-12-26 DIAGNOSIS — I50.32 CHRONIC HEART FAILURE WITH PRESERVED EJECTION FRACTION (HFPEF): ICD-10-CM

## 2024-12-27 RX ORDER — EMPAGLIFLOZIN 10 MG/1
10 TABLET, FILM COATED ORAL DAILY
Qty: 90 TABLET | Refills: 0 | Status: SHIPPED | OUTPATIENT
Start: 2024-12-27

## 2025-01-08 ENCOUNTER — SPECIALTY PHARMACY (OUTPATIENT)
Facility: HOSPITAL | Age: 89
End: 2025-01-08
Payer: MEDICARE

## 2025-01-08 NOTE — PROGRESS NOTES
Specialty Pharmacy Patient Management Program  Cardiology Reassessment     Agapito Downey was referred by a Cardiology provider to the Cardiology Patient Management program offered by [facility] for Hyperlipidemia. A follow-up outreach was conducted, including assessment of continued therapy appropriateness, medication adherence, and side effect incidence and management for Repatha.    Changes to Insurance Coverage or Financial Support  No changes    Relevant Past Medical History and Comorbidities  Relevant medical history and concomitant health conditions were discussed with the patient. The patient's chart has been reviewed for relevant past medical history and comorbid health conditions and updated as necessary.   Past Medical History:   Diagnosis Date    Anxiety Aug 2023    AV bloc first degree     CAD (coronary artery disease)     Cancer     prostate cancer seed implantion; Brachytherapy 2006, PSA has since been undetectable    CHF (congestive heart failure) Dec 2023    Cholelithiasis ?    Diverticulosis     Dyslipidemia     GERD (gastroesophageal reflux disease)     Hyperlipidemia     Hypertension     benign    Hypothyroidism     Myocardial infarction 1994    Nephrolithiasis 09/20/2014    Osteoarthritis     Renal insufficiency      Social History     Socioeconomic History    Marital status:    Tobacco Use    Smoking status: Never     Passive exposure: Never    Smokeless tobacco: Never   Vaping Use    Vaping status: Never Used   Substance and Sexual Activity    Alcohol use: No    Drug use: No    Sexual activity: Not Currently     Partners: Female     Problem list reviewed by Emma Rangel, PharmD on 1/8/2025 at  2:30 PM    Hospitalizations and Urgent Care Since Last Assessment  ED Visits, Admissions, or Hospitalizations: None  Urgent Office Visits: None    Allergies  Known allergies and reactions were discussed with the patient. The patient's chart has been reviewed  for allergy information and updated as necessary.   Allergies   Allergen Reactions    Reserpine Other (See Comments)     depression    Statins Other (See Comments)     Myalgias and mood affect disorder  fatigue       Allergies reviewed by Emma Rangel, PharmD on 1/8/2025 at  2:29 PM    Relevant Laboratory Values  Relevant laboratory values were discussed with the patient. The following specialty medication dose adjustment(s) are recommended: LDL above goal at last check- this was due to missing a dose by accident. Patient will follow up with cardiologist on 2/4/25    Lab Results   Component Value Date    GLUCOSE 137 (H) 12/12/2024    CALCIUM 10.5 12/12/2024     12/12/2024    K 3.9 12/12/2024    CO2 26.1 12/12/2024    CL 96 (L) 12/12/2024    BUN 30 (H) 12/12/2024    CREATININE 1.71 (H) 12/12/2024    EGFRIFAFRI 89 12/07/2021    EGFRIFNONA 74 12/07/2021    BCR 17.5 12/12/2024    ANIONGAP 16.9 (H) 12/12/2024     Lab Results   Component Value Date    CHOL 212 (H) 12/12/2024    CHLPL 79 12/18/2023    TRIG 332 (H) 12/12/2024    HDL 44 12/12/2024     (H) 12/12/2024     Microalbumin          6/20/2024    14:28 12/12/2024    08:50   Microalbumin   Microalbumin, Urine <1.2  <1.2      Current Medication List  This medication list has been reviewed with the patient and evaluated for any interactions or necessary modifications/recommendations, and updated to include all prescription medications, OTC medications, and supplements the patient is currently taking.  This list reflects what is contained in the patient's profile, which has also been marked as reviewed to communicate to other providers it is the most up to date version of the patient's current medication therapy.     Current Outpatient Medications:     bisoprolol (ZEBeta) 5 MG tablet, TAKE 1.5 TABLETS BY MOUTH DAILY AT NIGHT, Disp: 135 tablet, Rfl: 0    Cholecalciferol (Vitamin D3) 1.25 MG (95343 UT) capsule, TAKE 1 CAPSULE BY MOUTH ONCE WEEKLY, Disp: 12  capsule, Rfl: 1    Evolocumab (REPATHA) solution auto-injector SureClick injection, Inject 1 mL under the skin into the appropriate area as directed Every 14 (Fourteen) Days., Disp: 6 mL, Rfl: 3    furosemide (LASIX) 40 MG tablet, Take 1 tablet by mouth Daily., Disp: , Rfl:     gabapentin (NEURONTIN) 300 MG capsule, Take 1 capsule by mouth Every Night., Disp: 30 capsule, Rfl: 2    Jardiance 10 MG tablet tablet, TAKE 1 TABLET BY MOUTH DAILY, Disp: 90 tablet, Rfl: 0    ketotifen (ZADITOR) 0.025 % ophthalmic solution, Apply 1 drop to eye(s) as directed by provider Daily As Needed., Disp: , Rfl:     levothyroxine (SYNTHROID, LEVOTHROID) 75 MCG tablet, TAKE ONE TABLET BY MOUTH ONE TIME DAILY, Disp: 90 tablet, Rfl: 3    losartan (COZAAR) 50 MG tablet, Take 1 tablet by mouth Daily., Disp: 30 tablet, Rfl: 3    magnesium oxide (MAG-OX) 400 MG tablet, Take 1 tablet by mouth Daily., Disp: , Rfl:     metFORMIN ER (GLUCOPHAGE-XR) 500 MG 24 hr tablet, TAKE 1 TABLET BY MOUTH DAILY, Disp: 90 tablet, Rfl: 1    metOLazone (ZAROXOLYN) 2.5 MG tablet, TAKE 1 TABLET BY MOUTH DAILY AS NEEDED FOR EDEMA, Disp: 30 tablet, Rfl: 1    Multiple Vitamins-Minerals (CENTRUM SILVER 50+MEN PO), Take 1 tablet by mouth Daily., Disp: , Rfl:     polyethyl glycol-propyl glycol (SYSTANE) 0.4-0.3 % solution ophthalmic solution, Apply 1 drop to eye(s) as directed by provider Daily As Needed., Disp: , Rfl:     potassium chloride (KLOR-CON M10) 10 MEQ CR tablet, TAKE 1 TABLET BY MOUTH 2 TIMES A DAY, Disp: 60 tablet, Rfl: 2    Probiotic Product (ALIGN) 4 MG capsule, Take 1 capsule by mouth Daily., Disp: , Rfl:     psyllium (METAMUCIL) 100 % pack packet, Take 1 packet by mouth Daily., Disp: , Rfl:     spironolactone (ALDACTONE) 25 MG tablet, Take 1 tablet by mouth Every Other Day., Disp: 45 tablet, Rfl: 5    Medicines reviewed by Emma Rangel, PharmD on 1/8/2025 at  2:30 PM    Drug Interactions  None    Adverse Drug Reactions  Medication tolerability:  Tolerating with no to minimal ADRs  Medication plan: Continue therapy with normal follow-up  Plan for ADR Management: N/A    Adherence, Self-Administration, and Current Therapy Problems  Adherence related to the patient's specialty therapy was discussed with the patient. The Adherence segment of this outreach has been reviewed and updated.     Adherence Questions  Linked Medication(s) Assessed: Evolocumab (REPATHA)  On average, how many doses/injections does the patient miss per month?: 0 (Patient did miss one dose in early Dec 2024)  What are the identified reasons for non-adherence or missed doses? : no problems identified  What is the estimated medication adherence level?: %  Based on the patient/caregiver response and refill history, does this patient require an MTP to track adherence improvements?: no    Additional Barriers to Patient Self-Administration: N/A  Methods for Supporting Patient Self-Administration: N/A    Open Medication Therapy Problems  No medication therapy recommendations to display    Goals of Therapy  Goals related to the patient's specialty therapy were discussed with the patient. The Patient Goals segment of this outreach has been reviewed and updated.   Goals Addressed Today        Specialty Pharmacy General Goal      LDL Goal < 100 mg/dL    Lab Results   Component Value Date     (H) 12/12/2024    LDL 76 06/20/2024    LDL 17 12/18/2023    LDL 55 06/15/2023    LDL 63 12/15/2022     1/8/25: Patient's most recent LDL above goal of 100, however, this was due to patient missing a dose by accident. Will ask MD about redrawing level at next OV on 2/4/25. Continue Repatha.                Quality of Life Assessment   Quality of Life related to the patient's enrollment in the patient management program and services provided was discussed with the patient. The QOL segment of this outreach has been reviewed and updated.  Quality of Life Improvement Scale: 8-Moderately  better    Reassessment Plan & Follow-Up  1. Medication Therapy Changes: Continue Repatha 140mg subcutaneous every 14 days   2. Related Plans, Therapy Recommendations, or Issues to Be Addressed: None  3. Pharmacist to perform regular assessments no more than (6) months from the previous assessment.  4. Care Coordinator to set up future refill outreaches, coordinate prescription delivery, and escalate clinical questions to pharmacist.    Attestation  Therapeutic appropriateness: Appropriate   I attest the patient was actively involved in and has agreed to the above plan of care.  If the prescribed therapy is at any point deemed not appropriate based on the current or future assessments, a consultation will be initiated with the patient's specialty care provider to determine the best course of action. The revised plan of therapy will be documented along with any required assessments and/or additional patient education provided.     Emma Rangel, PharmD, [facility]  Clinical Specialty Pharmacist, Cardiology  1/8/2025  14:33 EST

## 2025-01-15 RX ORDER — CHOLECALCIFEROL (VITAMIN D3) 1250 MCG
50000 CAPSULE ORAL WEEKLY
Qty: 12 CAPSULE | Refills: 1 | Status: SHIPPED | OUTPATIENT
Start: 2025-01-15

## 2025-01-16 RX ORDER — LEVOTHYROXINE SODIUM 75 UG/1
75 TABLET ORAL DAILY
Qty: 90 TABLET | Refills: 0 | Status: SHIPPED | OUTPATIENT
Start: 2025-01-16

## 2025-02-05 PROCEDURE — 93296 REM INTERROG EVL PM/IDS: CPT | Performed by: INTERNAL MEDICINE

## 2025-02-05 PROCEDURE — 93294 REM INTERROG EVL PM/LDLS PM: CPT | Performed by: INTERNAL MEDICINE

## 2025-02-07 ENCOUNTER — SPECIALTY PHARMACY (OUTPATIENT)
Dept: CARDIOLOGY | Facility: CLINIC | Age: 89
End: 2025-02-07
Payer: MEDICARE

## 2025-02-18 RX ORDER — METOLAZONE 2.5 MG/1
TABLET ORAL
Qty: 30 TABLET | Refills: 1 | Status: SHIPPED | OUTPATIENT
Start: 2025-02-18

## 2025-03-06 DIAGNOSIS — I10 ESSENTIAL HYPERTENSION: ICD-10-CM

## 2025-03-06 RX ORDER — BISOPROLOL FUMARATE 5 MG/1
TABLET, FILM COATED ORAL
Qty: 135 TABLET | Refills: 0 | Status: SHIPPED | OUTPATIENT
Start: 2025-03-06

## 2025-03-12 DIAGNOSIS — I50.31 ACUTE DIASTOLIC CHF (CONGESTIVE HEART FAILURE): ICD-10-CM

## 2025-03-13 RX ORDER — POTASSIUM CHLORIDE 750 MG/1
10 TABLET, EXTENDED RELEASE ORAL 2 TIMES DAILY
Qty: 60 TABLET | Refills: 1 | Status: SHIPPED | OUTPATIENT
Start: 2025-03-13

## 2025-03-17 RX ORDER — METFORMIN HYDROCHLORIDE 500 MG/1
500 TABLET, EXTENDED RELEASE ORAL DAILY
Qty: 90 TABLET | Refills: 1 | Status: SHIPPED | OUTPATIENT
Start: 2025-03-17

## 2025-03-24 DIAGNOSIS — I10 ESSENTIAL HYPERTENSION: ICD-10-CM

## 2025-03-25 RX ORDER — FUROSEMIDE 40 MG/1
40 TABLET ORAL 2 TIMES DAILY
Qty: 60 TABLET | Refills: 1 | Status: SHIPPED | OUTPATIENT
Start: 2025-03-25

## 2025-03-26 DIAGNOSIS — I50.32 CHRONIC HEART FAILURE WITH PRESERVED EJECTION FRACTION (HFPEF): ICD-10-CM

## 2025-03-27 RX ORDER — EMPAGLIFLOZIN 10 MG/1
10 TABLET, FILM COATED ORAL DAILY
Qty: 90 TABLET | Refills: 0 | Status: SHIPPED | OUTPATIENT
Start: 2025-03-27

## 2025-04-01 ENCOUNTER — OFFICE VISIT (OUTPATIENT)
Dept: CARDIOLOGY | Facility: CLINIC | Age: 89
End: 2025-04-01
Payer: MEDICARE

## 2025-04-01 VITALS
WEIGHT: 184 LBS | HEART RATE: 62 BPM | SYSTOLIC BLOOD PRESSURE: 128 MMHG | HEIGHT: 68 IN | BODY MASS INDEX: 27.89 KG/M2 | DIASTOLIC BLOOD PRESSURE: 76 MMHG | OXYGEN SATURATION: 95 %

## 2025-04-01 DIAGNOSIS — I25.810 CORONARY ARTERY DISEASE INVOLVING CORONARY BYPASS GRAFT OF NATIVE HEART WITHOUT ANGINA PECTORIS: Primary | Chronic | ICD-10-CM

## 2025-04-01 DIAGNOSIS — E78.5 DYSLIPIDEMIA: ICD-10-CM

## 2025-04-01 DIAGNOSIS — I73.9 PVD (PERIPHERAL VASCULAR DISEASE) WITH CLAUDICATION: ICD-10-CM

## 2025-04-01 DIAGNOSIS — I10 ESSENTIAL HYPERTENSION: Chronic | ICD-10-CM

## 2025-04-01 DIAGNOSIS — I49.5 SSS (SICK SINUS SYNDROME): ICD-10-CM

## 2025-04-01 RX ORDER — FUROSEMIDE 40 MG/1
40 TABLET ORAL DAILY
Qty: 90 TABLET | Refills: 3
Start: 2025-04-01

## 2025-04-01 NOTE — PROGRESS NOTES
[facility] Cardiology  Office visit  Agapito Downey  1935  [phone]  There is no work phone number on file.    VISIT DATE:  4/1/2025    PCP: Kendrick Oconnor MD  [address]  [address]  [facility] [address]    CC:  Chief Complaint   Patient presents with    ASHD (arteriosclerotic heart disease)     PROBLEM LIST:  Coronary artery disease:  History of myocardial infarction in 1994 with TPA and associated NSVT.  Angioplasty at that time.  Recurrent chest pain approximately 6 months later with catheterization in November 1994.  Subsequent coronary artery bypass grafting, November 1994.  Multiple stress tests and echocardiograms post procedure with most recent echocardiogram, 01/19/2011:  Ejection fraction 60% to 65% with mild diastolic dysfunction.  No evidence of ischemia on stress test.  First-degree AV block.  Benign hypertension.  Dyslipidemia.  History of prostate cancer with seed implantation.  Osteoarthritis.  Family history of heart disease.  Surgical history:  Laparoscopic cholecystectomy.  Coronary artery bypass grafting.  Prostate surgery with seed implantation.  Previous cardiac studies and procedures:  2017  Dobutamine myocardial perfusion imaging  The patient's underlying heart rhythm is second-degree AV block Mobitz type I. With dobutamine infusion, the rhythm converted to sinus tachycardia  The ECG portion of the stress test was negative for ECG and clinical evidence for ischemia.  The myocardial perfusion imaging portion of the stress test indicates a small-sized infarct located in the basal inferior lateral wall with mild tri-infarct ischemia.  Overall, impressions are consistent with a low risk study.  TTE  Left ventricular systolic function is normal. Estimated EF = 54%.  Left ventricular wall thickness is consistent with mild concentric hypertrophy.  Left ventricular diastolic dysfunction (grade I) consistent with impaired  relaxation.    January 2024 TTE    Left ventricular systolic function is normal. Calculated left ventricular EF = 52% Left ventricular ejection fraction appears to be 46 - 50%.    Left ventricular wall thickness is consistent with mild concentric hypertrophy.    Left ventricular diastolic function was indeterminate.    The right ventricular cavity is mildly dilated.    The right atrial cavity is dilated.    Moderate tricuspid valve regurgitation is present.    Estimated right ventricular systolic pressure from tricuspid regurgitation is mildly elevated (35-45 mmHg).    ASSESSMENT:   Diagnosis Plan   1. Coronary artery disease involving coronary bypass graft of native heart without angina pectoris        2. Dyslipidemia        3. Essential hypertension  furosemide (LASIX) 40 MG tablet      4. SSS (sick sinus syndrome)        5. PVD (peripheral vascular disease) with claudication  US Ankle / Brachial Indices Extremity Limited            Device interrogation:  [place] dual-chamber  Normal atrial and ventricular pacing and sensing thresholds.  Estimated battery life 6 years  Less than 1% mode switching secondary to atrial flutter, longest episode lasting 3.5 hours    PLAN:  Coronary disease: Currently stable asymptomatic.  Continue current medical therapy.  Developed epistaxis with antiplatelet agents.    Sick sinus syndrome: Continue routine follow-up in device clinic.    Paroxysmal A-fib/flutter: Limited burden of arrhythmia.  Has developed significant epistaxis with both Eliquis and low-dose aspirin.  Continue to trend burden of arrhythmia through device interrogation, will consider left atrial appendage closure if arrhythmia increases in frequency or duration.    Heart failure with reduced ejection fraction, chronic: Currently euvolemic and compensated.  Continue current medical therapy.  Continue metolazone 2.5 mg p.o. as needed for weight gain greater than 3 to 5 pounds above baseline.    Subjective  Currently  "denies palpitations or dyspnea on exertion.  Weights ranging from 175 to 180 pounds, currently taking metolazone when he hits 180.  Currently needing to take it about every 7 days.  Compliant with medical therapy.  Blood pressures running less than 130/80 mmHg.  Intermittent burning sensation on the top of his right foot, usually at rest.    PHYSICAL EXAMINATION:  Vitals:    04/01/25 1020   BP: 128/76   BP Location: Left arm   Patient Position: Sitting   Pulse: 62   SpO2: 95%   Weight: 83.5 kg (184 lb)   Height: 172.7 cm (68\")       General Appearance:    Alert, cooperative, no distress, appears stated age   Head:    Normocephalic, without obvious abnormality, atraumatic   Eyes:    conjunctiva/corneas clear   Nose:   Nares normal, septum midline, mucosa normal, no drainage   Throat:   Lips, teeth and gums normal   Neck:   Supple, symmetrical, trachea midline, no carotid    bruit or JVD   Lungs:     Clear to auscultation bilaterally, respirations unlabored   Chest Wall:    No tenderness or deformity    Heart:    Regular rate and rhythm, S1 and S2 normal, no murmur, rub   or gallop, normal carotid impulse bilaterally without bruit.   Abdomen:     Soft, non-tender   Extremities:   Extremities normal, atraumatic, no cyanosis or edema   Pulses:   2+ and symmetric all extremities   Skin:   Skin color, texture, turgor normal, no rashes or lesions       Diagnostic Data:  Procedures  Lab Results   Component Value Date    CHLPL 79 12/18/2023    TRIG 332 (H) 12/12/2024    HDL 44 12/12/2024     Lab Results   Component Value Date    GLUCOSE 137 (H) 12/12/2024    BUN 30 (H) 12/12/2024    CREATININE 1.71 (H) 12/12/2024     12/12/2024    K 3.9 12/12/2024    CL 96 (L) 12/12/2024    CO2 26.1 12/12/2024     Lab Results   Component Value Date    HGBA1C 6.10 (H) 12/12/2024     Lab Results   Component Value Date    WBC 7.67 12/12/2024    HGB 15.1 12/12/2024    HCT 44.6 12/12/2024     12/12/2024       Allergies  Allergies "   Allergen Reactions    Reserpine Other (See Comments)     depression    Statins Other (See Comments)     Myalgias and mood affect disorder  fatigue         Current Medications    Current Outpatient Medications:     bisoprolol (ZEBeta) 5 MG tablet, TAKE 1.5 TABLETS BY MOUTH EACH NIGHT, Disp: 135 tablet, Rfl: 0    Cholecalciferol (Vitamin D3) 1.25 MG (46395 UT) capsule, TAKE 1 CAPSULE BY MOUTH ONCE WEEKLY, Disp: 12 capsule, Rfl: 1    Evolocumab (REPATHA) solution auto-injector SureClick injection, Inject 1 mL under the skin into the appropriate area as directed Every 14 (Fourteen) Days., Disp: 6 mL, Rfl: 3    furosemide (LASIX) 40 MG tablet, Take 1 tablet by mouth Daily., Disp: 90 tablet, Rfl: 3    Jardiance 10 MG tablet tablet, TAKE 1 TABLET BY MOUTH DAILY, Disp: 90 tablet, Rfl: 0    ketotifen (ZADITOR) 0.025 % ophthalmic solution, Apply 1 drop to eye(s) as directed by provider Daily As Needed., Disp: , Rfl:     levothyroxine (SYNTHROID, LEVOTHROID) 75 MCG tablet, TAKE ONE TABLET BY MOUTH ONE TIME DAILY, Disp: 90 tablet, Rfl: 0    losartan (COZAAR) 50 MG tablet, Take 1 tablet by mouth Daily., Disp: 30 tablet, Rfl: 3    magnesium oxide (MAG-OX) 400 MG tablet, Take 1 tablet by mouth Daily., Disp: , Rfl:     metFORMIN ER (GLUCOPHAGE-XR) 500 MG 24 hr tablet, TAKE 1 TABLET BY MOUTH DAILY, Disp: 90 tablet, Rfl: 1    metOLazone (ZAROXOLYN) 2.5 MG tablet, TAKE 1 TABLET BY MOUTH DAILY AS NEEDED FOR EDEMA, Disp: 30 tablet, Rfl: 1    Multiple Vitamins-Minerals (CENTRUM SILVER 50+MEN PO), Take 1 tablet by mouth Daily., Disp: , Rfl:     polyethyl glycol-propyl glycol (SYSTANE) 0.4-0.3 % solution ophthalmic solution, Apply 1 drop to eye(s) as directed by provider Daily As Needed., Disp: , Rfl:     potassium chloride (KLOR-CON M10) 10 MEQ CR tablet, TAKE 1 TABLET BY MOUTH 2 TIMES A DAY, Disp: 60 tablet, Rfl: 1    Probiotic Product (ALIGN) 4 MG capsule, Take 1 capsule by mouth Daily., Disp: , Rfl:     psyllium (METAMUCIL) 100 %  pack packet, Take 1 packet by mouth Daily., Disp: , Rfl:     spironolactone (ALDACTONE) 25 MG tablet, Take 1 tablet by mouth Every Other Day., Disp: 45 tablet, Rfl: 5          ROS  ROS      SOCIAL HX  Social History     Socioeconomic History    Marital status:    Tobacco Use    Smoking status: Never     Passive exposure: Never    Smokeless tobacco: Never   Vaping Use    Vaping status: Never Used   Substance and Sexual Activity    Alcohol use: No    Drug use: No    Sexual activity: Not Currently     Partners: Female       FAMILY HX  Family History   Problem Relation Age of Onset    Heart disease Father     Coronary artery disease Brother         cause of death    Heart failure Brother     Heart disease Brother     Heart disease Other              Shar Jamison III, MD, FACC

## 2025-04-02 DIAGNOSIS — I73.9 PVD (PERIPHERAL VASCULAR DISEASE) WITH CLAUDICATION: Primary | ICD-10-CM

## 2025-04-22 RX ORDER — LEVOTHYROXINE SODIUM 75 UG/1
75 TABLET ORAL DAILY
Qty: 90 TABLET | Refills: 3 | Status: SHIPPED | OUTPATIENT
Start: 2025-04-22

## 2025-04-22 NOTE — TELEPHONE ENCOUNTER
Rx Refill Note  Requested Prescriptions     Pending Prescriptions Disp Refills    levothyroxine (SYNTHROID, LEVOTHROID) 75 MCG tablet 90 tablet 0     Sig: Take 1 tablet by mouth Daily.      Last office visit with prescribing clinician: 12/16/2024   Last telemedicine visit with prescribing clinician: Visit date not found   Next office visit with prescribing clinician: 6/19/2025                         Would you like a call back once the refill request has been completed: [] Yes [] No    If the office needs to give you a call back, can they leave a voicemail: [] Yes [] No    Katie Liu MA  04/22/25, 11:14 EDT

## 2025-05-02 ENCOUNTER — SPECIALTY PHARMACY (OUTPATIENT)
Dept: CARDIOLOGY | Facility: CLINIC | Age: 89
End: 2025-05-02
Payer: MEDICARE

## 2025-05-02 NOTE — PROGRESS NOTES
" Specialty Pharmacy Patient Management Program  Cardiology Specialty Pharmacy Refill Outreach      Agapito \"Ashwin" is a 89 y.o. male contacted today regarding refills of his medication(s).    Specialty medication(s) and dose(s) confirmed: Repatha  Other medications being refilled: n/a    Refill Questions      Flowsheet Row Most Recent Value   Changes to allergies? No   Changes to medications? No   New conditions or infections since last clinic visit No   Unplanned office visit, urgent care, ED, or hospital admission in the last 4 weeks  No   How does patient/caregiver feel medication is working? Very good   Financial problems or insurance changes  No   Since the previous refill, were any specialty medication doses or scheduled injections missed or delayed?  No   Does this patient require a clinical escalation to a pharmacist? No            Delivery Questions      Flowsheet Row Most Recent Value   Delivery method [employer]   Delivery address verified with patient/caregiver? Yes   Delivery address Home   Number of medications in delivery 1   Medication(s) being filled and delivered Evolocumab (REPATHA)   Doses left of specialty medications 1   Copay verified? Yes   Copay amount $0   Copay form of payment No copayment ($0)   Delivery Date Selection 05/06/25   Signature Required No   Do you consent to receive electronic handouts?  No                   Follow-up: 84 days     Onesimo Farfan CPhT-Adv  Pharmacy Care Coordinator  5/2/2025  10:24 EDT  "

## 2025-05-12 DIAGNOSIS — I50.31 ACUTE DIASTOLIC CHF (CONGESTIVE HEART FAILURE): ICD-10-CM

## 2025-05-12 RX ORDER — POTASSIUM CHLORIDE 750 MG/1
10 TABLET, EXTENDED RELEASE ORAL 2 TIMES DAILY
Qty: 60 TABLET | Refills: 1 | Status: SHIPPED | OUTPATIENT
Start: 2025-05-12

## 2025-05-16 ENCOUNTER — HOSPITAL ENCOUNTER (OUTPATIENT)
Dept: CARDIOLOGY | Facility: HOSPITAL | Age: 89
Discharge: HOME | End: 2025-05-16
Payer: MEDICARE

## 2025-05-16 VITALS — HEIGHT: 68 IN | WEIGHT: 184.08 LBS | BODY MASS INDEX: 27.9 KG/M2

## 2025-05-16 DIAGNOSIS — I73.9 PVD (PERIPHERAL VASCULAR DISEASE) WITH CLAUDICATION: ICD-10-CM

## 2025-05-16 PROCEDURE — 93922 UPR/L XTREMITY ART 2 LEVELS: CPT

## 2025-05-18 LAB
BH CV LOWER ARTERIAL LEFT ABI RATIO: 1.25
BH CV LOWER ARTERIAL LEFT DORSALIS PEDIS SYS MAX: 145
BH CV LOWER ARTERIAL LEFT POST TIBIAL SYS MAX: 142
BH CV LOWER ARTERIAL LEFT TBI RATIO: 1.08
BH CV LOWER ARTERIAL RIGHT ABI RATIO: 1.2
BH CV LOWER ARTERIAL RIGHT DORSALIS PEDIS SYS MAX: 132
BH CV LOWER ARTERIAL RIGHT POST TIBIAL SYS MAX: 139
BH CV LOWER ARTERIAL RIGHT TBI RATIO: 0.88
UPPER ARTERIAL LEFT ARM BRACHIAL SYS MAX: 109
UPPER ARTERIAL RIGHT ARM BRACHIAL SYS MAX: 116

## 2025-05-30 DIAGNOSIS — I10 ESSENTIAL HYPERTENSION: Chronic | ICD-10-CM

## 2025-05-30 RX ORDER — FUROSEMIDE 40 MG/1
40 TABLET ORAL 2 TIMES DAILY
Qty: 60 TABLET | OUTPATIENT
Start: 2025-05-30

## 2025-05-30 RX ORDER — FUROSEMIDE 40 MG/1
40 TABLET ORAL DAILY
Qty: 90 TABLET | Refills: 1 | Status: SHIPPED | OUTPATIENT
Start: 2025-05-30

## 2025-06-04 DIAGNOSIS — I10 ESSENTIAL HYPERTENSION: ICD-10-CM

## 2025-06-04 RX ORDER — BISOPROLOL FUMARATE 5 MG/1
TABLET, FILM COATED ORAL
Qty: 135 TABLET | Refills: 0 | Status: SHIPPED | OUTPATIENT
Start: 2025-06-04

## 2025-06-12 ENCOUNTER — TELEPHONE (OUTPATIENT)
Dept: INTERNAL MEDICINE | Facility: CLINIC | Age: 89
End: 2025-06-12

## 2025-06-12 DIAGNOSIS — E11.42 DIABETIC POLYNEUROPATHY ASSOCIATED WITH TYPE 2 DIABETES MELLITUS: Primary | ICD-10-CM

## 2025-06-12 DIAGNOSIS — I10 ESSENTIAL HYPERTENSION: ICD-10-CM

## 2025-06-12 DIAGNOSIS — E03.9 ACQUIRED HYPOTHYROIDISM: ICD-10-CM

## 2025-06-12 DIAGNOSIS — E78.5 DYSLIPIDEMIA: ICD-10-CM

## 2025-06-12 NOTE — TELEPHONE ENCOUNTER
"  Caller: Agapito Downey \"Juan\"    Relationship: Self    Best call back number: [phone]     What orders are you requesting (i.e. lab or imaging): LABS ORDER FOR APPOINTMENT ON THURSDAY, JUNE 19.    In what timeframe would the patient need to come in: MONDAY OR TUESDAY     Where will you receive your lab/imaging services: [facility]    Additional notes:         "

## 2025-06-17 ENCOUNTER — LAB (OUTPATIENT)
Dept: LAB | Facility: HOSPITAL | Age: 89
End: 2025-06-17
Payer: MEDICARE

## 2025-06-17 ENCOUNTER — TRANSCRIBE ORDERS (OUTPATIENT)
Dept: LAB | Facility: HOSPITAL | Age: 89
End: 2025-06-17
Payer: MEDICARE

## 2025-06-17 DIAGNOSIS — E11.42 DIABETIC POLYNEUROPATHY ASSOCIATED WITH TYPE 2 DIABETES MELLITUS: ICD-10-CM

## 2025-06-17 DIAGNOSIS — E78.5 DYSLIPIDEMIA: ICD-10-CM

## 2025-06-17 DIAGNOSIS — N18.30 STAGE 3 CHRONIC KIDNEY DISEASE, UNSPECIFIED WHETHER STAGE 3A OR 3B CKD: Primary | ICD-10-CM

## 2025-06-17 DIAGNOSIS — N18.30 STAGE 3 CHRONIC KIDNEY DISEASE, UNSPECIFIED WHETHER STAGE 3A OR 3B CKD: ICD-10-CM

## 2025-06-17 DIAGNOSIS — E03.9 ACQUIRED HYPOTHYROIDISM: ICD-10-CM

## 2025-06-17 DIAGNOSIS — I10 ESSENTIAL HYPERTENSION: ICD-10-CM

## 2025-06-17 LAB
ALBUMIN SERPL-MCNC: 4.3 G/DL (ref 3.5–5.2)
ALBUMIN UR-MCNC: <1.2 MG/DL
ALBUMIN/GLOB SERPL: 1.3 G/DL
ALP SERPL-CCNC: 36 U/L (ref 39–117)
ALT SERPL W P-5'-P-CCNC: 18 U/L (ref 1–41)
ANION GAP SERPL CALCULATED.3IONS-SCNC: 16 MMOL/L (ref 5–15)
AST SERPL-CCNC: 19 U/L (ref 1–40)
BACTERIA UR QL AUTO: NORMAL /HPF
BASOPHILS # BLD AUTO: 0.07 10*3/MM3 (ref 0–0.2)
BASOPHILS NFR BLD AUTO: 0.9 % (ref 0–1.5)
BILIRUB SERPL-MCNC: 0.6 MG/DL (ref 0–1.2)
BILIRUB UR QL STRIP: NEGATIVE
BUN SERPL-MCNC: 27 MG/DL (ref 8–23)
BUN/CREAT SERPL: 16.9 (ref 7–25)
CALCIUM SPEC-SCNC: 10.1 MG/DL (ref 8.6–10.5)
CHLORIDE SERPL-SCNC: 97 MMOL/L (ref 98–107)
CHOLEST SERPL-MCNC: 200 MG/DL (ref 0–200)
CLARITY UR: CLEAR
CO2 SERPL-SCNC: 22 MMOL/L (ref 22–29)
COLOR UR: YELLOW
CREAT SERPL-MCNC: 1.6 MG/DL (ref 0.76–1.27)
CREAT UR-MCNC: 88.5 MG/DL
CREAT UR-MCNC: 88.5 MG/DL
DEPRECATED RDW RBC AUTO: 48.2 FL (ref 37–54)
EGFRCR SERPLBLD CKD-EPI 2021: 41.2 ML/MIN/1.73
EOSINOPHIL # BLD AUTO: 0.23 10*3/MM3 (ref 0–0.4)
EOSINOPHIL NFR BLD AUTO: 3 % (ref 0.3–6.2)
ERYTHROCYTE [DISTWIDTH] IN BLOOD BY AUTOMATED COUNT: 13.4 % (ref 12.3–15.4)
GLOBULIN UR ELPH-MCNC: 3.3 GM/DL
GLUCOSE SERPL-MCNC: 141 MG/DL (ref 65–99)
GLUCOSE UR STRIP-MCNC: ABNORMAL MG/DL
HBA1C MFR BLD: 6.4 % (ref 4.8–5.6)
HCT VFR BLD AUTO: 44.9 % (ref 37.5–51)
HDLC SERPL-MCNC: 41 MG/DL (ref 40–60)
HGB BLD-MCNC: 14.9 G/DL (ref 13–17.7)
HGB UR QL STRIP.AUTO: NEGATIVE
HYALINE CASTS UR QL AUTO: NORMAL /LPF
IMM GRANULOCYTES # BLD AUTO: 0.02 10*3/MM3 (ref 0–0.05)
IMM GRANULOCYTES NFR BLD AUTO: 0.3 % (ref 0–0.5)
KETONES UR QL STRIP: NEGATIVE
LDLC SERPL CALC-MCNC: 99 MG/DL (ref 0–100)
LDLC/HDLC SERPL: 2.15 {RATIO}
LEUKOCYTE ESTERASE UR QL STRIP.AUTO: NEGATIVE
LYMPHOCYTES # BLD AUTO: 2 10*3/MM3 (ref 0.7–3.1)
LYMPHOCYTES NFR BLD AUTO: 26.2 % (ref 19.6–45.3)
MCH RBC QN AUTO: 32.3 PG (ref 26.6–33)
MCHC RBC AUTO-ENTMCNC: 33.2 G/DL (ref 31.5–35.7)
MCV RBC AUTO: 97.2 FL (ref 79–97)
MICROALBUMIN/CREAT UR: NORMAL MG/G{CREAT}
MONOCYTES # BLD AUTO: 0.62 10*3/MM3 (ref 0.1–0.9)
MONOCYTES NFR BLD AUTO: 8.1 % (ref 5–12)
NEUTROPHILS NFR BLD AUTO: 4.7 10*3/MM3 (ref 1.7–7)
NEUTROPHILS NFR BLD AUTO: 61.5 % (ref 42.7–76)
NITRITE UR QL STRIP: NEGATIVE
NRBC BLD AUTO-RTO: 0 /100 WBC (ref 0–0.2)
PH UR STRIP.AUTO: 6 [PH] (ref 5–8)
PHOSPHATE SERPL-MCNC: 3 MG/DL (ref 2.5–4.5)
PLATELET # BLD AUTO: 270 10*3/MM3 (ref 140–450)
PMV BLD AUTO: 10.5 FL (ref 6–12)
POTASSIUM SERPL-SCNC: 4 MMOL/L (ref 3.5–5.2)
PROT ?TM UR-MCNC: 6 MG/DL
PROT SERPL-MCNC: 7.6 G/DL (ref 6–8.5)
PROT UR QL STRIP: NEGATIVE
PROT/CREAT UR: 67.8 MG/G CREA (ref 0–200)
RBC # BLD AUTO: 4.62 10*6/MM3 (ref 4.14–5.8)
RBC # UR STRIP: NORMAL /HPF
REF LAB TEST METHOD: NORMAL
SODIUM SERPL-SCNC: 135 MMOL/L (ref 136–145)
SP GR UR STRIP: 1.02 (ref 1–1.03)
SQUAMOUS #/AREA URNS HPF: NORMAL /HPF
TRIGL SERPL-MCNC: 355 MG/DL (ref 0–150)
TSH SERPL DL<=0.05 MIU/L-ACNC: 3.82 UIU/ML (ref 0.27–4.2)
URATE SERPL-MCNC: 7.4 MG/DL (ref 3.4–7)
UROBILINOGEN UR QL STRIP: ABNORMAL
VLDLC SERPL-MCNC: 60 MG/DL (ref 5–40)
WBC # UR STRIP: NORMAL /HPF
WBC NRBC COR # BLD AUTO: 7.64 10*3/MM3 (ref 3.4–10.8)

## 2025-06-17 PROCEDURE — 85025 COMPLETE CBC W/AUTO DIFF WBC: CPT

## 2025-06-17 PROCEDURE — 84100 ASSAY OF PHOSPHORUS: CPT

## 2025-06-17 PROCEDURE — 81001 URINALYSIS AUTO W/SCOPE: CPT

## 2025-06-17 PROCEDURE — 82570 ASSAY OF URINE CREATININE: CPT

## 2025-06-17 PROCEDURE — 80053 COMPREHEN METABOLIC PANEL: CPT

## 2025-06-17 PROCEDURE — 80061 LIPID PANEL: CPT

## 2025-06-17 PROCEDURE — 84550 ASSAY OF BLOOD/URIC ACID: CPT

## 2025-06-17 PROCEDURE — 84443 ASSAY THYROID STIM HORMONE: CPT

## 2025-06-17 PROCEDURE — 83036 HEMOGLOBIN GLYCOSYLATED A1C: CPT

## 2025-06-17 PROCEDURE — 84156 ASSAY OF PROTEIN URINE: CPT

## 2025-06-17 PROCEDURE — 36415 COLL VENOUS BLD VENIPUNCTURE: CPT

## 2025-06-17 PROCEDURE — 82043 UR ALBUMIN QUANTITATIVE: CPT

## 2025-06-19 ENCOUNTER — OFFICE VISIT (OUTPATIENT)
Dept: INTERNAL MEDICINE | Facility: CLINIC | Age: 89
End: 2025-06-19
Payer: MEDICARE

## 2025-06-19 VITALS
OXYGEN SATURATION: 95 % | BODY MASS INDEX: 27.74 KG/M2 | HEART RATE: 96 BPM | WEIGHT: 183 LBS | DIASTOLIC BLOOD PRESSURE: 78 MMHG | HEIGHT: 68 IN | SYSTOLIC BLOOD PRESSURE: 130 MMHG | TEMPERATURE: 98 F

## 2025-06-19 DIAGNOSIS — E11.42 DIABETIC POLYNEUROPATHY ASSOCIATED WITH TYPE 2 DIABETES MELLITUS: ICD-10-CM

## 2025-06-19 DIAGNOSIS — I50.32 CHRONIC HEART FAILURE WITH PRESERVED EJECTION FRACTION (HFPEF): ICD-10-CM

## 2025-06-19 DIAGNOSIS — E78.5 DYSLIPIDEMIA: ICD-10-CM

## 2025-06-19 DIAGNOSIS — E03.9 ACQUIRED HYPOTHYROIDISM: ICD-10-CM

## 2025-06-19 DIAGNOSIS — Z00.00 MEDICARE ANNUAL WELLNESS VISIT, SUBSEQUENT: Primary | ICD-10-CM

## 2025-06-19 DIAGNOSIS — I10 ESSENTIAL HYPERTENSION: Chronic | ICD-10-CM

## 2025-06-19 DIAGNOSIS — N18.32 STAGE 3B CHRONIC KIDNEY DISEASE: ICD-10-CM

## 2025-06-19 RX ORDER — ACETAMINOPHEN 500 MG
500 TABLET ORAL EVERY 6 HOURS PRN
Start: 2025-06-19

## 2025-06-19 RX ORDER — AZELASTINE HCL 205.5 UG/1
2 SPRAY, METERED NASAL 2 TIMES DAILY
Start: 2025-06-19

## 2025-06-19 NOTE — PROGRESS NOTES
Subjective   The ABCs of the Annual Wellness Visit  Medicare Wellness Visit      Agapito Downey is a 89 y.o. patient who presents for a Medicare Wellness Visit.    The following portions of the patient's history were reviewed and   updated as appropriate: allergies, current medications, past family history, past medical history, past social history, past surgical history, and problem list.    Compared to one year ago, the patient's physical   health is the same.  Compared to one year ago, the patient's mental   health is the same.    Recent Hospitalizations:  He was not admitted to the hospital during the last year.     Current Medical Providers:  Patient Care Team:  Kendrick Oconnor MD as PCP - General  Federico Campbell MD as Consulting Physician (Cardiology)  Shar Jamison III, MD as Cardiologist (Cardiology)  Robbi Milton MD as Consulting Physician (Nephrology)    Outpatient Medications Prior to Visit   Medication Sig Dispense Refill    bisoprolol (ZEBeta) 5 MG tablet TAKE 1.5 TABLET BY MOUTH EVERY NIGHT 135 tablet 0    Cholecalciferol (Vitamin D3) 1.25 MG (28399 UT) capsule TAKE 1 CAPSULE BY MOUTH ONCE WEEKLY 12 capsule 1    Evolocumab (REPATHA) solution auto-injector SureClick injection Inject 1 mL under the skin into the appropriate area as directed Every 14 (Fourteen) Days. 6 mL 3    furosemide (LASIX) 40 MG tablet Take 1 tablet by mouth Daily. 90 tablet 1    ketotifen (ZADITOR) 0.025 % ophthalmic solution Apply 1 drop to eye(s) as directed by provider Daily As Needed.      levothyroxine (SYNTHROID, LEVOTHROID) 75 MCG tablet Take 1 tablet by mouth Daily. 90 tablet 3    losartan (COZAAR) 50 MG tablet Take 1 tablet by mouth Daily. 30 tablet 3    magnesium oxide (MAG-OX) 400 MG tablet Take 1 tablet by mouth Daily.      metFORMIN ER (GLUCOPHAGE-XR) 500 MG 24 hr tablet TAKE 1 TABLET BY MOUTH DAILY 90 tablet 1    metOLazone (ZAROXOLYN) 2.5 MG tablet TAKE 1 TABLET BY MOUTH DAILY AS NEEDED FOR  EDEMA 30 tablet 1    Multiple Vitamins-Minerals (CENTRUM SILVER 50+MEN PO) Take 1 tablet by mouth Daily.      polyethyl glycol-propyl glycol (SYSTANE) 0.4-0.3 % solution ophthalmic solution Apply 1 drop to eye(s) as directed by provider Daily As Needed.      potassium chloride (KLOR-CON M10) 10 MEQ CR tablet TAKE 1 TABLET BY MOUTH 2 TIMES A DAY 60 tablet 1    Probiotic Product (ALIGN) 4 MG capsule Take 1 capsule by mouth Daily.      psyllium (METAMUCIL) 100 % pack packet Take 1 packet by mouth Daily.      spironolactone (ALDACTONE) 25 MG tablet Take 1 tablet by mouth Every Other Day. 45 tablet 5    Jardiance 10 MG tablet tablet TAKE 1 TABLET BY MOUTH DAILY 90 tablet 0     No facility-administered medications prior to visit.     No opioid medication identified on active medication list. I have reviewed chart for other potential  high risk medication/s and harmful drug interactions in the elderly.      Aspirin is not on active medication list.  Aspirin use is not indicated based on review of current medical condition/s. Risk of harm outweighs potential benefits.  .    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  ACP discussion was held with the patient during this visit. Patient has an advance directive in EMR which is still valid.             Objective   Vitals:    06/19/25 1324   BP: 130/78  "  BP Location: Left arm   Patient Position: Sitting   Cuff Size: Adult   Pulse: 96   Temp: 98 °F (36.7 °C)   TempSrc: Infrared   SpO2: 95%   Weight: 83 kg (183 lb)   Height: 172.7 cm (67.99\")   PainSc: 0-No pain       Estimated body mass index is 27.83 kg/m² as calculated from the following:    Height as of this encounter: 172.7 cm (67.99\").    Weight as of this encounter: 83 kg (183 lb).                Does the patient have evidence of cognitive impairment? No  Lab Results   Component Value Date    TRIG 355 (H) 2025    HDL 41 2025    LDL 99 2025    VLDL 60 (H) 2025    HGBA1C 6.40 (H) 2025                                                                                                Health  Risk Assessment    Smoking Status:  Social History     Tobacco Use   Smoking Status Never    Passive exposure: Never   Smokeless Tobacco Never     Alcohol Consumption:  Social History     Substance and Sexual Activity   Alcohol Use No       Fall Risk Screen  STEADI Fall Risk Assessment was completed, and patient is at LOW risk for falls.Assessment completed on:2025    Depression Screening   Little interest or pleasure in doing things? Not at all   Feeling down, depressed, or hopeless? Not at all   PHQ-2 Total Score 0      Health Habits and Functional and Cognitive Screenin/12/2025    12:09 PM   Functional & Cognitive Status   Do you have difficulty preparing food and eating? No   Do you have difficulty bathing yourself, getting dressed or grooming yourself? No   Do you have difficulty using the toilet? No   Do you have difficulty moving around from place to place? No   Do you have trouble with steps or getting out of a bed or a chair? No   Current Diet Well Balanced Diet   Dental Exam Up to date   Eye Exam Up to date   Exercise (times per week) 2 times per week   Current Exercises Include No Regular Exercise;Treadmill   Do you need help using the phone?  No   Are you deaf or do you have " serious difficulty hearing?  No   Do you need help to go to places out of walking distance? No   Do you need help shopping? No   Do you need help preparing meals?  No   Do you need help with housework?  No   Do you need help with laundry? No   Do you need help taking your medications? No   Do you need help managing money? No   Do you ever drive or ride in a car without wearing a seat belt? No   Have you felt unusual fatigue (could be tiredness), stress, anger or loneliness in the last month? No    Who do you live with? Spouse   If you need help, do you have trouble finding someone available to you? No   Have you been bothered in the last four weeks by sexual problems? No   Do you have difficulty concentrating, remembering or making decisions? No       Data saved with a previous flowsheet row definition           Age-appropriate Screening Schedule:  Refer to the list below for future screening recommendations based on patient's age, sex and/or medical conditions. Orders for these recommended tests are listed in the plan section. The patient has been provided with a written plan.    Health Maintenance List  Health Maintenance   Topic Date Due    DIABETIC FOOT EXAM  06/16/2023    DIABETIC EYE EXAM  07/17/2024    COVID-19 Vaccine (11 - 2024-25 season) 03/19/2025    ANNUAL WELLNESS VISIT  06/25/2025    INFLUENZA VACCINE  07/01/2025    HEMOGLOBIN A1C  12/17/2025    URINE MICROALBUMIN-CREATININE RATIO (uACR)  06/17/2026    TDAP/TD VACCINES (3 - Td or Tdap) 02/16/2028    RSV Vaccine - Adults  Completed    Pneumococcal Vaccine 50+  Completed    ZOSTER VACCINE  Completed                                                                                                                                                CMS Preventative Services Quick Reference  Risk Factors Identified During Encounter  None Identified    The above risks/problems have been discussed with the patient.  Pertinent information has been shared with the  patient in the After Visit Summary.  An After Visit Summary and PPPS were made available to the patient.    Follow Up:   Next Medicare Wellness visit to be scheduled in 1 year.     No opioid medication identified on active medication list. I have reviewed chart for other potential  high risk medication/s and harmful drug interactions in the elderly.      Additional E&M Note during same encounter follows:  Patient has additional, significant, and separately identifiable condition(s)/problem(s) that require work above and beyond the Medicare Wellness Visit     Chief Complaint  Annual Exam and Hypertension    Subjective    HPI  Juan is also being seen today for an annual adult preventative physical exam.        The patient presents for a subsequent Medicare annual wellness examination and follow-up of diabetes, hypertension, dyslipidemia, and hypothyroidism. He continues to see cardiology for coronary artery disease.    He reports overall good health with satisfactory sleep patterns, although inconsistent. Retiring before 10 PM enhances his sleep quality. He has not experienced any weight gain but acknowledges an increased appetite compared to the previous year. He consumes 4 ounces of orange juice daily with his morning medication, a habit he adopted following a urologist's advice to increase his citric acid intake after a kidney stone episode a decade ago. He has been incorporating more pasta into his diet recently. He is currently on Jardiance and Repatha.    He expresses concern about his elevated blood glucose levels despite no significant dietary changes. His blood glucose levels reach 180 earlier than before, necessitating medication.    He has been taking salmon pills for their omega-3 content but notes that his triglyceride levels remain high.    He is on bisoprolol and losartan for hypertension management.    He is on levothyroxine for hypothyroidism.    He has been experiencing episodes of waking up feeling  "excessively warm around 3 or 4 AM, leading him to discard his covers, but he does not perspire. He uses a fan for comfort during the summer months. He has purchased a foot massager for pain relief but is hesitant to use it due to his pacemaker.          Objective   Vital Signs:  /78 (BP Location: Left arm, Patient Position: Sitting, Cuff Size: Adult)   Pulse 96   Temp 98 °F (36.7 °C) (Infrared)   Ht 172.7 cm (67.99\")   Wt 83 kg (183 lb)   SpO2 95%   BMI 27.83 kg/m²   Physical Exam  Vitals reviewed.   Constitutional:       Appearance: Normal appearance. He is well-developed.   HENT:      Head: Normocephalic and atraumatic.      Right Ear: Tympanic membrane and ear canal normal.      Left Ear: Tympanic membrane and ear canal normal.      Mouth/Throat:      Pharynx: Oropharynx is clear. No posterior oropharyngeal erythema.   Eyes:      Extraocular Movements: Extraocular movements intact.      Pupils: Pupils are equal, round, and reactive to light.   Neck:      Thyroid: No thyromegaly.      Vascular: No carotid bruit.   Cardiovascular:      Rate and Rhythm: Normal rate and regular rhythm.      Pulses:           Dorsalis pedis pulses are 1+ on the right side and 1+ on the left side.      Heart sounds: Normal heart sounds. No murmur heard.     No friction rub. No gallop.   Pulmonary:      Effort: Pulmonary effort is normal.      Breath sounds: Normal breath sounds.   Abdominal:      General: Bowel sounds are normal.      Palpations: Abdomen is soft.      Tenderness: There is no abdominal tenderness.   Musculoskeletal:      Cervical back: Normal range of motion and neck supple.      Right lower leg: No edema.      Left lower leg: No edema.      Right foot: No deformity.      Left foot: No deformity.   Feet:      Right foot:      Protective Sensation: 5 sites tested.  5 sites sensed.      Skin integrity: Skin integrity normal.      Left foot:      Protective Sensation: 5 sites tested.  5 sites sensed.      Skin " integrity: Skin integrity normal.      Comments: Sensation in both feet intact to monofilament.     Diabetic Foot Exam Performed and Monofilament Test Performed    Lymphadenopathy:      Cervical: No cervical adenopathy.   Skin:     General: Skin is warm and dry.   Neurological:      Mental Status: He is alert and oriented to person, place, and time.      Cranial Nerves: No cranial nerve deficit.   Psychiatric:         Mood and Affect: Mood normal.         Behavior: Behavior normal.                       Results  Labs   - GFR: 12/2024, 41   - Creatinine: Abnormal   - Liver Function Test: Normal   - A1c: 6.4%   - Fasting Glucose: Slightly higher than last time   - Triglycerides: Elevated   - LDL: Acceptable           Assessment and Plan Additional age appropriate preventative wellness advice topics were discussed during today's preventative wellness exam(some topics already addressed during AWV portion of the note above):    Physical Activity: Advised cardiovascular activity 150 minutes per week as tolerated. (example brisk walk for 30 minutes, 5 days a week).     Nutrition: Discussed nutrition plan with patient. Information shared in after visit summary. Goal is for a well balanced diet to enhance overall health.     Healthy Weight: Discussed current and goal BMI with patient. Steps to attain this goal discussed. Information shared in after visit summary.     Injury Prevention Discussion:  Information shared in after visit summary.             1. Health maintenance.  Given his comorbidities, he is doing very well overall. Discussed ways to reduce simple carbohydrates in his diet.    2. Diabetes mellitus.  His A1c has increased slightly to 6.4%, but it remains within the well-controlled range. Dietary adjustments were discussed, including reducing simple carbohydrates and considering alternative types of pasta like chickpea pasta. The dosage of Jardiance will be increased to 25 mg daily. He will take two 10 mg tablets  daily until they are finished, then switch to the 25 mg tablets. If his A1c continues to climb, further medication adjustments may be considered.    3. Hypertension.  His blood pressure is well controlled with bisoprolol and losartan.    4. Dyslipidemia.  His LDL levels are within acceptable limits, but triglycerides remain elevated. He will continue with Repatha. Dietary adjustments to reduce simple carbohydrates were discussed.    5. Hypothyroidism.  He is clinically euthyroid on the current dose of levothyroxine, with a normal TSH level.    6. Chronic kidney disease.  His GFR is stable in the low 40s. The treatment plan includes controlling blood pressure and blood glucose levels and avoiding NSAIDs.    Follow-up  The patient will follow up in 6 months.         Follow Up   Return in about 6 months (around 12/19/2025) for follow up.  Patient was given instructions and counseling regarding his condition or for health maintenance advice. Please see specific information pulled into the AVS if appropriate.  Patient or patient representative verbalized consent for the use of Ambient Listening during the visit with  Kendrick Oconnor MD for chart documentation. 6/25/2025  21:02 EDT

## 2025-06-23 DIAGNOSIS — I50.32 CHRONIC HEART FAILURE WITH PRESERVED EJECTION FRACTION (HFPEF): ICD-10-CM

## 2025-06-23 RX ORDER — EMPAGLIFLOZIN 10 MG/1
10 TABLET, FILM COATED ORAL DAILY
Qty: 90 TABLET | Refills: 0 | OUTPATIENT
Start: 2025-06-23

## 2025-07-07 ENCOUNTER — SPECIALTY PHARMACY (OUTPATIENT)
Facility: HOSPITAL | Age: 89
End: 2025-07-07
Payer: MEDICARE

## 2025-07-07 ENCOUNTER — TELEPHONE (OUTPATIENT)
Dept: CARDIOLOGY | Facility: CLINIC | Age: 89
End: 2025-07-07
Payer: MEDICARE

## 2025-07-07 RX ORDER — LOSARTAN POTASSIUM 50 MG/1
50 TABLET ORAL DAILY
Qty: 90 TABLET | Refills: 3 | Status: SHIPPED | OUTPATIENT
Start: 2025-07-07

## 2025-07-07 NOTE — PROGRESS NOTES
Specialty Pharmacy Patient Management Program  Cardiology Reassessment     Agapito Downey was referred by a Cardiology provider to the Cardiology Patient Management program offered by [facility] for Hyperlipidemia. A follow-up outreach was conducted, including assessment of continued therapy appropriateness, medication adherence, and side effect incidence and management for Repatha.    Changes to Insurance Coverage or Financial Support  No changes    Relevant Past Medical History and Comorbidities  Relevant medical history and concomitant health conditions were discussed with the patient. The patient's chart has been reviewed for relevant past medical history and comorbid health conditions and updated as necessary.   Past Medical History:   Diagnosis Date    Anxiety Aug 2023    AV bloc first degree     CAD (coronary artery disease)     Cancer     prostate cancer seed implantion; Brachytherapy 2006, PSA has since been undetectable    CHF (congestive heart failure) Dec 2023    Cholelithiasis ?    Diverticulosis     Dyslipidemia     GERD (gastroesophageal reflux disease)     Hyperlipidemia     Hypertension     benign    Hypothyroidism     Myocardial infarction 1994    Nephrolithiasis 09/20/2014    Osteoarthritis     Renal insufficiency      Social History     Socioeconomic History    Marital status:    Tobacco Use    Smoking status: Never     Passive exposure: Never    Smokeless tobacco: Never   Vaping Use    Vaping status: Never Used   Substance and Sexual Activity    Alcohol use: No    Drug use: No    Sexual activity: Not Currently     Partners: Female     Problem list reviewed by Emma Rangel, PharmD on 7/7/2025 at  2:46 PM    Hospitalizations and Urgent Care Since Last Assessment  ED Visits, Admissions, or Hospitalizations: None  Urgent Office Visits: None    Allergies  Known allergies and reactions were discussed with the patient. The patient's chart has been reviewed  for allergy information and updated as necessary.   Allergies   Allergen Reactions    Reserpine Other (See Comments)     depression    Statins Other (See Comments)     Myalgias and mood affect disorder  fatigue       Allergies reviewed by Emma Rangel, PharmD on 7/7/2025 at  2:45 PM    Relevant Laboratory Values  Relevant laboratory values were discussed with the patient. The following specialty medication dose adjustment(s) are recommended: N/A    Lab Results   Component Value Date    GLUCOSE 141 (H) 06/17/2025    CALCIUM 10.1 06/17/2025     (L) 06/17/2025    K 4.0 06/17/2025    CO2 22.0 06/17/2025    CL 97 (L) 06/17/2025    BUN 27.0 (H) 06/17/2025    CREATININE 1.60 (H) 06/17/2025    EGFRIFAFRI 89 12/07/2021    EGFRIFNONA 74 12/07/2021    BCR 16.9 06/17/2025    ANIONGAP 16.0 (H) 06/17/2025     Lab Results   Component Value Date    CHOL 200 06/17/2025    CHLPL 79 12/18/2023    TRIG 355 (H) 06/17/2025    HDL 41 06/17/2025    LDL 99 06/17/2025     Microalbumin          12/12/2024    08:50 6/17/2025    08:35   Microalbumin   Microalbumin, Urine <1.2  <1.2      Current Medication List  This medication list has been reviewed with the patient and evaluated for any interactions or necessary modifications/recommendations, and updated to include all prescription medications, OTC medications, and supplements the patient is currently taking.  This list reflects what is contained in the patient's profile, which has also been marked as reviewed to communicate to other providers it is the most up to date version of the patient's current medication therapy.     Current Outpatient Medications:     Evolocumab (REPATHA) solution auto-injector SureClick injection, Inject 1 mL under the skin into the appropriate area as directed Every 14 (Fourteen) Days., Disp: 6 mL, Rfl: 4    acetaminophen (TYLENOL) 500 MG tablet, Take 1 tablet by mouth Every 6 (Six) Hours As Needed for Mild Pain or Moderate Pain., Disp: , Rfl:      azelastine (Astepro Allergy) 0.15 % solution nasal spray, Administer 2 sprays into the nostril(s) as directed by provider 2 (Two) Times a Day., Disp: , Rfl:     bisoprolol (ZEBeta) 5 MG tablet, TAKE 1.5 TABLET BY MOUTH EVERY NIGHT, Disp: 135 tablet, Rfl: 0    Cholecalciferol (Vitamin D3) 1.25 MG (71998 UT) capsule, TAKE 1 CAPSULE BY MOUTH ONCE WEEKLY, Disp: 12 capsule, Rfl: 1    empagliflozin (Jardiance) 25 MG tablet tablet, Take 1 tablet by mouth Daily., Disp: 90 tablet, Rfl: 3    furosemide (LASIX) 40 MG tablet, Take 1 tablet by mouth Daily., Disp: 90 tablet, Rfl: 1    ketotifen (ZADITOR) 0.025 % ophthalmic solution, Apply 1 drop to eye(s) as directed by provider Daily As Needed., Disp: , Rfl:     levothyroxine (SYNTHROID, LEVOTHROID) 75 MCG tablet, Take 1 tablet by mouth Daily., Disp: 90 tablet, Rfl: 3    losartan (COZAAR) 50 MG tablet, Take 1 tablet by mouth Daily., Disp: 90 tablet, Rfl: 3    magnesium oxide (MAG-OX) 400 MG tablet, Take 1 tablet by mouth Daily., Disp: , Rfl:     metFORMIN ER (GLUCOPHAGE-XR) 500 MG 24 hr tablet, TAKE 1 TABLET BY MOUTH DAILY, Disp: 90 tablet, Rfl: 1    metOLazone (ZAROXOLYN) 2.5 MG tablet, TAKE 1 TABLET BY MOUTH DAILY AS NEEDED FOR EDEMA, Disp: 30 tablet, Rfl: 1    Multiple Vitamins-Minerals (CENTRUM SILVER 50+MEN PO), Take 1 tablet by mouth Daily., Disp: , Rfl:     polyethyl glycol-propyl glycol (SYSTANE) 0.4-0.3 % solution ophthalmic solution, Apply 1 drop to eye(s) as directed by provider Daily As Needed., Disp: , Rfl:     potassium chloride (KLOR-CON M10) 10 MEQ CR tablet, TAKE 1 TABLET BY MOUTH 2 TIMES A DAY, Disp: 60 tablet, Rfl: 1    Probiotic Product (ALIGN) 4 MG capsule, Take 1 capsule by mouth Daily., Disp: , Rfl:     psyllium (METAMUCIL) 100 % pack packet, Take 1 packet by mouth Daily., Disp: , Rfl:     spironolactone (ALDACTONE) 25 MG tablet, Take 1 tablet by mouth Every Other Day., Disp: 45 tablet, Rfl: 5    Medicines reviewed by Emma Rangel, PharmD on 7/7/2025  at  2:45 PM    Drug Interactions  None    Adverse Drug Reactions  Medication tolerability: Tolerating with no to minimal ADRs  Medication plan: Continue therapy with normal follow-up  Plan for ADR Management: N/A    Adherence, Self-Administration, and Current Therapy Problems  Adherence related to the patient's specialty therapy was discussed with the patient. The Adherence segment of this outreach has been reviewed and updated.     Adherence Questions  Linked Medication(s) Assessed: Evolocumab (REPATHA)  On average, how many doses/injections does the patient miss per month?: 0  What are the identified reasons for non-adherence or missed doses? : no problems identified  What is the estimated medication adherence level?: %  Based on the patient/caregiver response and refill history, does this patient require an MTP to track adherence improvements?: no    Additional Barriers to Patient Self-Administration: N/A  Methods for Supporting Patient Self-Administration: N/A    Open Medication Therapy Problems  No medication therapy recommendations to display    Goals of Therapy  Goals related to the patient's specialty therapy were discussed with the patient. The Patient Goals segment of this outreach has been reviewed and updated.   Goals Addressed Today        Specialty Pharmacy General Goal      LDL Goal < 100 mg/dL    Lab Results   Component Value Date    LDL 99 06/17/2025     (H) 12/12/2024    LDL 76 06/20/2024    LDL 17 12/18/2023    LDL 55 06/15/2023     1/8/25: Patient's most recent LDL above goal of 100, however, this was due to patient missing a dose by accident. Will ask MD about redrawing level at next OV on 2/4/25. Continue Repatha.    7/7/25: Had labs with PCP three weeks ago and LDL at goal; Continue Repatha              Quality of Life Assessment   Quality of Life related to the patient's enrollment in the patient management program and services provided was discussed with the patient. The QOL  segment of this outreach has been reviewed and updated.  Quality of Life Improvement Scale: 8-Moderately better    Reassessment Plan & Follow-Up  1. Medication Therapy Changes: Continue Repatha 140mg subcutaneous every 14 days   2. Related Plans, Therapy Recommendations, or Issues to Be Addressed: None  3. Pharmacist to perform regular assessments no more than (6) months from the previous assessment.  4. Care Coordinator to set up future refill outreaches, coordinate prescription delivery, and escalate clinical questions to pharmacist.    Attestation  Therapeutic appropriateness: Appropriate   I attest the patient was actively involved in and has agreed to the above plan of care.  If the prescribed therapy is at any point deemed not appropriate based on the current or future assessments, a consultation will be initiated with the patient's specialty care provider to determine the best course of action. The revised plan of therapy will be documented along with any required assessments and/or additional patient education provided.     Emma Rangel, PharmD, BCPS  Clinical Specialty Pharmacist, Cardiology  7/7/2025  14:48 EDT

## 2025-07-09 DIAGNOSIS — I50.31 ACUTE DIASTOLIC CHF (CONGESTIVE HEART FAILURE): ICD-10-CM

## 2025-07-09 RX ORDER — POTASSIUM CHLORIDE 750 MG/1
10 TABLET, EXTENDED RELEASE ORAL 2 TIMES DAILY
Qty: 60 TABLET | Refills: 2 | Status: SHIPPED | OUTPATIENT
Start: 2025-07-09

## 2025-07-09 RX ORDER — CHOLECALCIFEROL (VITAMIN D3) 1250 MCG
50000 CAPSULE ORAL WEEKLY
Qty: 12 CAPSULE | Refills: 1 | Status: SHIPPED | OUTPATIENT
Start: 2025-07-09

## 2025-08-06 LAB
MDC_IDC_MSMT_BATTERY_REMAINING_LONGEVITY: 53 MO
MDC_IDC_MSMT_BATTERY_REMAINING_PERCENTAGE: 44 %
MDC_IDC_MSMT_BATTERY_RRT_TRIGGER: 2.6
MDC_IDC_MSMT_BATTERY_STATUS: NORMAL
MDC_IDC_MSMT_BATTERY_VOLTAGE: 2.96
MDC_IDC_MSMT_LEADCHNL_RA_DTM: NORMAL
MDC_IDC_MSMT_LEADCHNL_RA_IMPEDANCE_VALUE: 560
MDC_IDC_MSMT_LEADCHNL_RA_PACING_THRESHOLD_AMPLITUDE: 0.75
MDC_IDC_MSMT_LEADCHNL_RA_PACING_THRESHOLD_POLARITY: NORMAL
MDC_IDC_MSMT_LEADCHNL_RA_PACING_THRESHOLD_PULSEWIDTH: 0.5
MDC_IDC_MSMT_LEADCHNL_RA_SENSING_INTR_AMPL: 5
MDC_IDC_MSMT_LEADCHNL_RV_DTM: NORMAL
MDC_IDC_MSMT_LEADCHNL_RV_IMPEDANCE_VALUE: 690
MDC_IDC_MSMT_LEADCHNL_RV_PACING_THRESHOLD_AMPLITUDE: 0.75
MDC_IDC_MSMT_LEADCHNL_RV_PACING_THRESHOLD_POLARITY: NORMAL
MDC_IDC_MSMT_LEADCHNL_RV_PACING_THRESHOLD_PULSEWIDTH: 0.5
MDC_IDC_MSMT_LEADCHNL_RV_SENSING_INTR_AMPL: 6.4
MDC_IDC_PG_IMPLANT_DTM: NORMAL
MDC_IDC_PG_MFG: NORMAL
MDC_IDC_PG_MODEL: NORMAL
MDC_IDC_PG_SERIAL: NORMAL
MDC_IDC_PG_TYPE: NORMAL
MDC_IDC_SESS_DTM: NORMAL
MDC_IDC_SESS_TYPE: NORMAL
MDC_IDC_SET_BRADY_AT_MODE_SWITCH_RATE: 180
MDC_IDC_SET_BRADY_LOWRATE: 60
MDC_IDC_SET_BRADY_MAX_SENSOR_RATE: 130
MDC_IDC_SET_BRADY_MAX_TRACKING_RATE: 130
MDC_IDC_SET_BRADY_MODE: NORMAL
MDC_IDC_SET_BRADY_PAV_DELAY: 200
MDC_IDC_SET_BRADY_SAV_DELAY: 150
MDC_IDC_SET_LEADCHNL_RA_PACING_AMPLITUDE: 1.75
MDC_IDC_SET_LEADCHNL_RA_PACING_POLARITY: NORMAL
MDC_IDC_SET_LEADCHNL_RA_PACING_PULSEWIDTH: 0.5
MDC_IDC_SET_LEADCHNL_RA_SENSING_POLARITY: NORMAL
MDC_IDC_SET_LEADCHNL_RA_SENSING_SENSITIVITY: 0.5
MDC_IDC_SET_LEADCHNL_RV_PACING_AMPLITUDE: 1
MDC_IDC_SET_LEADCHNL_RV_PACING_POLARITY: NORMAL
MDC_IDC_SET_LEADCHNL_RV_PACING_PULSEWIDTH: 0.5
MDC_IDC_SET_LEADCHNL_RV_SENSING_POLARITY: NORMAL
MDC_IDC_SET_LEADCHNL_RV_SENSING_SENSITIVITY: 2
MDC_IDC_STAT_AT_BURDEN_PERCENT: 1
MDC_IDC_STAT_BRADY_RA_PERCENT_PACED: 80
MDC_IDC_STAT_BRADY_RV_PERCENT_PACED: 99

## 2025-08-07 ENCOUNTER — LAB (OUTPATIENT)
Dept: LAB | Facility: HOSPITAL | Age: 89
End: 2025-08-07
Payer: MEDICARE

## 2025-08-07 ENCOUNTER — TRANSCRIBE ORDERS (OUTPATIENT)
Dept: LAB | Facility: HOSPITAL | Age: 89
End: 2025-08-07
Payer: MEDICARE

## 2025-08-07 DIAGNOSIS — N18.30 STAGE 3 CHRONIC KIDNEY DISEASE, UNSPECIFIED WHETHER STAGE 3A OR 3B CKD: Primary | ICD-10-CM

## 2025-08-07 DIAGNOSIS — N18.30 STAGE 3 CHRONIC KIDNEY DISEASE, UNSPECIFIED WHETHER STAGE 3A OR 3B CKD: ICD-10-CM

## 2025-08-07 LAB
ALBUMIN SERPL-MCNC: 4.3 G/DL (ref 3.5–5.2)
ANION GAP SERPL CALCULATED.3IONS-SCNC: 19 MMOL/L (ref 5–15)
BACTERIA UR QL AUTO: NORMAL /HPF
BILIRUB UR QL STRIP: NEGATIVE
BUN SERPL-MCNC: 37 MG/DL (ref 8–23)
BUN/CREAT SERPL: 20 (ref 7–25)
CALCIUM SPEC-SCNC: 10.4 MG/DL (ref 8.6–10.5)
CHLORIDE SERPL-SCNC: 96 MMOL/L (ref 98–107)
CLARITY UR: CLEAR
CO2 SERPL-SCNC: 21 MMOL/L (ref 22–29)
COLOR UR: YELLOW
CREAT SERPL-MCNC: 1.85 MG/DL (ref 0.76–1.27)
DEPRECATED RDW RBC AUTO: 45.9 FL (ref 37–54)
EGFRCR SERPLBLD CKD-EPI 2021: 34.6 ML/MIN/1.73
ERYTHROCYTE [DISTWIDTH] IN BLOOD BY AUTOMATED COUNT: 12.9 % (ref 12.3–15.4)
GLUCOSE SERPL-MCNC: 132 MG/DL (ref 65–99)
GLUCOSE UR STRIP-MCNC: ABNORMAL MG/DL
HCT VFR BLD AUTO: 45.8 % (ref 37.5–51)
HGB BLD-MCNC: 15.6 G/DL (ref 13–17.7)
HGB UR QL STRIP.AUTO: NEGATIVE
HYALINE CASTS UR QL AUTO: NORMAL /LPF
KETONES UR QL STRIP: NEGATIVE
LEUKOCYTE ESTERASE UR QL STRIP.AUTO: NEGATIVE
MCH RBC QN AUTO: 33.2 PG (ref 26.6–33)
MCHC RBC AUTO-ENTMCNC: 34.1 G/DL (ref 31.5–35.7)
MCV RBC AUTO: 97.4 FL (ref 79–97)
NITRITE UR QL STRIP: NEGATIVE
PH UR STRIP.AUTO: 6.5 [PH] (ref 5–8)
PHOSPHATE SERPL-MCNC: 3.5 MG/DL (ref 2.5–4.5)
PLATELET # BLD AUTO: 295 10*3/MM3 (ref 140–450)
PMV BLD AUTO: 10.5 FL (ref 6–12)
POTASSIUM SERPL-SCNC: 3.7 MMOL/L (ref 3.5–5.2)
PROT UR QL STRIP: NEGATIVE
RBC # BLD AUTO: 4.7 10*6/MM3 (ref 4.14–5.8)
RBC # UR STRIP: NORMAL /HPF
REF LAB TEST METHOD: NORMAL
SODIUM SERPL-SCNC: 136 MMOL/L (ref 136–145)
SP GR UR STRIP: 1.02 (ref 1–1.03)
SQUAMOUS #/AREA URNS HPF: NORMAL /HPF
STARCH GRANULES URNS QL MICRO: PRESENT /HPF
URATE SERPL-MCNC: 9.3 MG/DL (ref 3.4–7)
UROBILINOGEN UR QL STRIP: ABNORMAL
WBC # UR STRIP: NORMAL /HPF
WBC NRBC COR # BLD AUTO: 7.92 10*3/MM3 (ref 3.4–10.8)

## 2025-08-07 PROCEDURE — 81001 URINALYSIS AUTO W/SCOPE: CPT

## 2025-08-07 PROCEDURE — 82570 ASSAY OF URINE CREATININE: CPT

## 2025-08-07 PROCEDURE — 84550 ASSAY OF BLOOD/URIC ACID: CPT

## 2025-08-07 PROCEDURE — 84156 ASSAY OF PROTEIN URINE: CPT

## 2025-08-07 PROCEDURE — 80069 RENAL FUNCTION PANEL: CPT

## 2025-08-07 PROCEDURE — 85027 COMPLETE CBC AUTOMATED: CPT

## 2025-08-07 PROCEDURE — 36415 COLL VENOUS BLD VENIPUNCTURE: CPT

## 2025-08-08 LAB
CREAT UR-MCNC: 83.5 MG/DL
PROT ?TM UR-MCNC: 4.9 MG/DL

## 2025-08-25 ENCOUNTER — SPECIALTY PHARMACY (OUTPATIENT)
Dept: CARDIOLOGY | Facility: CLINIC | Age: 89
End: 2025-08-25
Payer: MEDICARE

## (undated) DEVICE — 3M™ TEGADERM™ CHG DRESSING 25/CARTON 4 CARTONS/CASE 1659: Brand: TEGADERM™

## (undated) DEVICE — 3M™ STERI-DRAPE™ INSTRUMENT POUCH 1018L: Brand: STERI-DRAPE™

## (undated) DEVICE — INTRO TEAR AWAY/LVD W/SD PRT 6F 13CM